# Patient Record
Sex: MALE | Race: WHITE | HISPANIC OR LATINO | Employment: OTHER | URBAN - METROPOLITAN AREA
[De-identification: names, ages, dates, MRNs, and addresses within clinical notes are randomized per-mention and may not be internally consistent; named-entity substitution may affect disease eponyms.]

---

## 2019-07-30 ENCOUNTER — TRANSFERRED RECORDS (OUTPATIENT)
Dept: HEALTH INFORMATION MANAGEMENT | Facility: CLINIC | Age: 79
End: 2019-07-30

## 2020-07-02 DIAGNOSIS — H35.349 MACULAR HOLE: Primary | ICD-10-CM

## 2020-07-02 DIAGNOSIS — H35.341 MACULAR HOLE OF RIGHT EYE: ICD-10-CM

## 2020-07-09 ENCOUNTER — OFFICE VISIT (OUTPATIENT)
Dept: OPHTHALMOLOGY | Facility: CLINIC | Age: 80
End: 2020-07-09
Attending: OPHTHALMOLOGY
Payer: MEDICARE

## 2020-07-09 DIAGNOSIS — H35.341 MACULAR HOLE OF RIGHT EYE: ICD-10-CM

## 2020-07-09 PROCEDURE — G0463 HOSPITAL OUTPT CLINIC VISIT: HCPCS | Mod: ZF

## 2020-07-09 PROCEDURE — 92134 CPTRZ OPH DX IMG PST SGM RTA: CPT | Mod: ZF | Performed by: OPHTHALMOLOGY

## 2020-07-09 RX ORDER — AMIODARONE HYDROCHLORIDE 200 MG/1
TABLET ORAL
COMMUNITY
Start: 2019-10-19 | End: 2020-07-21

## 2020-07-09 RX ORDER — ATORVASTATIN CALCIUM 20 MG/1
1 TABLET, FILM COATED ORAL AT BEDTIME
COMMUNITY
Start: 2020-04-20

## 2020-07-09 RX ORDER — ALLOPURINOL 100 MG/1
2 TABLET ORAL DAILY
COMMUNITY
Start: 2020-06-06

## 2020-07-09 ASSESSMENT — SLIT LAMP EXAM - LIDS
COMMENTS: UL DERMATOCHALASIS
COMMENTS: UL DERMATOCHALASIS

## 2020-07-09 ASSESSMENT — REFRACTION_WEARINGRX
OS_CYLINDER: +1.50
OD_CYLINDER: +1.25
OS_AXIS: 34
OS_SPHERE: -5.00
SPECS_TYPE: SVL
OD_AXIS: 070
OD_SPHERE: -1.75

## 2020-07-09 ASSESSMENT — CONF VISUAL FIELD
OS_NORMAL: 1
OD_NORMAL: 1
METHOD: COUNTING FINGERS

## 2020-07-09 ASSESSMENT — VISUAL ACUITY
OS_CC+: -2
OS_CC: 20/50
CORRECTION_TYPE: GLASSES
OD_CC: 7/200 E
METHOD: SNELLEN - LINEAR

## 2020-07-09 ASSESSMENT — EXTERNAL EXAM - RIGHT EYE: OD_EXAM: NORMAL

## 2020-07-09 ASSESSMENT — TONOMETRY
OD_IOP_MMHG: 17
OS_IOP_MMHG: 17
IOP_METHOD: TONOPEN

## 2020-07-09 ASSESSMENT — CUP TO DISC RATIO
OS_RATIO: 0.4
OD_RATIO: 0.5

## 2020-07-09 ASSESSMENT — EXTERNAL EXAM - LEFT EYE: OS_EXAM: NORMAL

## 2020-07-09 NOTE — PROGRESS NOTES
CC: Macula hole second opinion  HPI: Mushtaq Olsen is a  80 year old year-old patient with history of full thickness macular hole right eye that was unsuccessfully repaired in [place] ~8 years ago. He has also seen [facility] and [facility] who did not recommend treatment, per patient. Comes to [facility] for second opinion. Also endorses steady decline in vision left eye, associated glare - believes his cataract is getting worse.     OCULAR HISTORY  posterior chamber intraocular lens (PCIOL) right eye ~5 years ago  Cataract left eye   Full thickness macular hole right eye     Retinal Imaging:  OCT  7-9-20  RE: full thickness macula hole, surrounding cystoid macular edema; moderate ERM  LE: moderate epiretinal membrane, normal foveal contour    Assessment & Plan:    1. 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  If it failed to close with one surgery, further surgeries could be done, but the subsequent success rate was much less.  I explained that with full thickness macular holes, the average vision recovery was usually only partial.  I explained that the vision might not improve at all after surgery, that there could be persistent defects, distortion, or blurriness, and that it was possible the vision could be substantially worse or blind after surgery due to unexpected complications. I explained that I would need to place a gas bubble in the eye after surgery and that this would require face-down positioning, for a few weeks after surgery.  I explained that with a gas bubble, air travel and travel to high altitudes would be  forbidden for up to 3 months after surgery. I explained that a retinal detachment might develop either during or after surgery, and that this could require further surgeries and could result in severe vision loss despite further surgeries.  After explaining all risks, benefits and alternatives of the surgery including but not limited to endophthalmitis, retina detachment and cataract the patient agrees to proceed.    2. Epiretinal membrane, both eyes    Consider Membrane peel right eye    Foveal contour intact left eye   Suspect cataract predominant factor limiting vision left eye     3. Nuclear sclerotic cataract, left eye    Visually significant (+blur, +glare) x1 year   Might need cataract evaluation in the future     4. Pseudophakia, right eye    S/p YAG posterior capsulotomy   Visual axis clear   Vision limited by FTMH     return to clinic     Gibran Hassan MD  Ophthalmology Resident - PGY-3    ~~~~~~~~~~~~~~~~~~~~~~~~~~~~~~~~~~   Complete documentation of historical and exam elements from today's encounter can be found in the full encounter summary report (not reduplicated in this progress note).  I personally obtained the chief complaint(s) and history of present illness.  I confirmed and edited as necessary the review of systems, past medical/surgical history, family history, social history, and examination findings as documented by others; and I examined the patient myself.  I personally reviewed the relevant tests, images, and reports as documented above.  I personally reviewed the ophthalmic test(s) associated with this encounter, agree with the interpretation(s) as documented by the resident/fellow, and have edited the corresponding report(s) as necessary.   I formulated and edited as necessary the assessment and plan and discussed the findings and management plan with the patient and family    Beata Vasquez MD   of Ophthalmology.  Retina Service   Department of Ophthalmology and  Visual Neurosciences   [facility]  Phone: [phone]   Fax: [phone]

## 2020-07-09 NOTE — NURSING NOTE
Chief Complaints and History of Present Illnesses   Patient presents with     Retinal Evaluation     Chief Complaint(s) and History of Present Illness(es)     Retinal Evaluation     Laterality: right eye    Course: stable    Associated symptoms: Negative for dryness, eye pain, headache and floaters    Response to treatment: no improvement    Pain scale: 0/10              Comments     Patient was referred by Dr Melyssa Godwin for a macular hole in right eye.  His vision has seemed stable in his right eye, though it is very blurred. He tells me that he had a vitrectomy about 8-10 years ago in his right eye, but the procedure did not help his vision.      His vision in the left eye seems decreased from his cataract.  As a result, he is unable to see well in certain lighting conditions.    Sera Wong, COT 8:06 AM  July 9, 2020

## 2020-07-13 ENCOUNTER — TELEPHONE (OUTPATIENT)
Dept: OPHTHALMOLOGY | Facility: CLINIC | Age: 80
End: 2020-07-13

## 2020-07-13 DIAGNOSIS — Z11.59 ENCOUNTER FOR SCREENING FOR OTHER VIRAL DISEASES: Primary | ICD-10-CM

## 2020-07-13 NOTE — TELEPHONE ENCOUNTER
Patient is scheduled for surgery with Dr. Beata Vasquez     Spoke with: Mima     Date of Surgery: 07/21/20     Location: [facility]:  [address] [place] [address]     Informed patient they will need an adult : Yes     H&P will be completed at: [facility]     Post Op scheduled on 07/22 and 07/29     Surgery packet was 07/13     Additional comments: Advised RN will call 1 - 2 business days prior with arrival time and instructions.

## 2020-07-18 DIAGNOSIS — Z11.59 ENCOUNTER FOR SCREENING FOR OTHER VIRAL DISEASES: ICD-10-CM

## 2020-07-18 LAB
LABORATORY COMMENT REPORT: NORMAL
SARS-COV-2 RNA SPEC QL NAA+PROBE: NEGATIVE
SARS-COV-2 RNA SPEC QL NAA+PROBE: NORMAL
SPECIMEN SOURCE: NORMAL
SPECIMEN SOURCE: NORMAL

## 2020-07-18 PROCEDURE — U0003 INFECTIOUS AGENT DETECTION BY NUCLEIC ACID (DNA OR RNA); SEVERE ACUTE RESPIRATORY SYNDROME CORONAVIRUS 2 (SARS-COV-2) (CORONAVIRUS DISEASE [COVID-19]), AMPLIFIED PROBE TECHNIQUE, MAKING USE OF HIGH THROUGHPUT TECHNOLOGIES AS DESCRIBED BY CMS-2020-01-R: HCPCS | Performed by: OPHTHALMOLOGY

## 2020-07-20 ENCOUNTER — ANESTHESIA EVENT (OUTPATIENT)
Dept: SURGERY | Facility: AMBULATORY SURGERY CENTER | Age: 80
End: 2020-07-20

## 2020-07-21 ENCOUNTER — HOSPITAL ENCOUNTER (OUTPATIENT)
Facility: AMBULATORY SURGERY CENTER | Age: 80
End: 2020-07-21
Attending: OPHTHALMOLOGY
Payer: MEDICARE

## 2020-07-21 ENCOUNTER — ANESTHESIA (OUTPATIENT)
Dept: SURGERY | Facility: AMBULATORY SURGERY CENTER | Age: 80
End: 2020-07-21

## 2020-07-21 VITALS
DIASTOLIC BLOOD PRESSURE: 58 MMHG | BODY MASS INDEX: 31.22 KG/M2 | WEIGHT: 206 LBS | OXYGEN SATURATION: 98 % | SYSTOLIC BLOOD PRESSURE: 139 MMHG | RESPIRATION RATE: 18 BRPM | HEART RATE: 63 BPM | TEMPERATURE: 97.9 F | HEIGHT: 68 IN

## 2020-07-21 DIAGNOSIS — H35.341 MACULAR HOLE OF RIGHT EYE: ICD-10-CM

## 2020-07-21 RX ORDER — PHENYLEPHRINE HYDROCHLORIDE 25 MG/ML
SOLUTION/ DROPS OPHTHALMIC PRN
Status: DISCONTINUED | OUTPATIENT
Start: 2020-07-21 | End: 2020-07-21 | Stop reason: HOSPADM

## 2020-07-21 RX ORDER — SODIUM CHLORIDE, SODIUM LACTATE, POTASSIUM CHLORIDE, CALCIUM CHLORIDE 600; 310; 30; 20 MG/100ML; MG/100ML; MG/100ML; MG/100ML
INJECTION, SOLUTION INTRAVENOUS CONTINUOUS
Status: DISCONTINUED | OUTPATIENT
Start: 2020-07-21 | End: 2020-07-22 | Stop reason: HOSPADM

## 2020-07-21 RX ORDER — OXYCODONE HYDROCHLORIDE 5 MG/1
5 TABLET ORAL EVERY 4 HOURS PRN
Status: DISCONTINUED | OUTPATIENT
Start: 2020-07-21 | End: 2020-07-22 | Stop reason: HOSPADM

## 2020-07-21 RX ORDER — ONDANSETRON 2 MG/ML
4 INJECTION INTRAMUSCULAR; INTRAVENOUS EVERY 30 MIN PRN
Status: DISCONTINUED | OUTPATIENT
Start: 2020-07-21 | End: 2020-07-22 | Stop reason: HOSPADM

## 2020-07-21 RX ORDER — FENTANYL CITRATE 50 UG/ML
25-50 INJECTION, SOLUTION INTRAMUSCULAR; INTRAVENOUS
Status: DISCONTINUED | OUTPATIENT
Start: 2020-07-21 | End: 2020-07-22 | Stop reason: HOSPADM

## 2020-07-21 RX ORDER — CYCLOPENTOLAT/TROPIC/PHENYLEPH 1%-1%-2.5%
1 DROPS (EA) OPHTHALMIC (EYE)
Status: COMPLETED | OUTPATIENT
Start: 2020-07-21 | End: 2020-07-21

## 2020-07-21 RX ORDER — TETRACAINE HYDROCHLORIDE 5 MG/ML
SOLUTION OPHTHALMIC PRN
Status: DISCONTINUED | OUTPATIENT
Start: 2020-07-21 | End: 2020-07-21 | Stop reason: HOSPADM

## 2020-07-21 RX ORDER — ONDANSETRON 4 MG/1
4 TABLET, ORALLY DISINTEGRATING ORAL EVERY 30 MIN PRN
Status: DISCONTINUED | OUTPATIENT
Start: 2020-07-21 | End: 2020-07-22 | Stop reason: HOSPADM

## 2020-07-21 RX ORDER — HYDROCODONE BITARTRATE AND ACETAMINOPHEN 5; 325 MG/1; MG/1
5 TABLET ORAL PRN
COMMUNITY
Start: 2020-07-13 | End: 2023-05-09

## 2020-07-21 RX ORDER — NALOXONE HYDROCHLORIDE 0.4 MG/ML
.1-.4 INJECTION, SOLUTION INTRAMUSCULAR; INTRAVENOUS; SUBCUTANEOUS
Status: DISCONTINUED | OUTPATIENT
Start: 2020-07-21 | End: 2020-07-22 | Stop reason: HOSPADM

## 2020-07-21 RX ORDER — BALANCED SALT SOLUTION 6.4; .75; .48; .3; 3.9; 1.7 MG/ML; MG/ML; MG/ML; MG/ML; MG/ML; MG/ML
SOLUTION OPHTHALMIC PRN
Status: DISCONTINUED | OUTPATIENT
Start: 2020-07-21 | End: 2020-07-21 | Stop reason: HOSPADM

## 2020-07-21 RX ORDER — ACETAMINOPHEN 500 MG
500 TABLET ORAL EVERY 6 HOURS PRN
COMMUNITY

## 2020-07-21 RX ORDER — LIDOCAINE 40 MG/G
CREAM TOPICAL
Status: DISCONTINUED | OUTPATIENT
Start: 2020-07-21 | End: 2020-07-22 | Stop reason: HOSPADM

## 2020-07-21 RX ORDER — METHYLPREDNISOLONE 4 MG
4 TABLET, DOSE PACK ORAL PRN
COMMUNITY
Start: 2020-03-09 | End: 2023-05-10

## 2020-07-21 RX ORDER — ATROPINE SULFATE 10 MG/ML
SOLUTION/ DROPS OPHTHALMIC PRN
Status: DISCONTINUED | OUTPATIENT
Start: 2020-07-21 | End: 2020-07-21 | Stop reason: HOSPADM

## 2020-07-21 RX ORDER — TOBRAMYCIN AND DEXAMETHASONE 3; 1 MG/ML; MG/ML
1 SUSPENSION/ DROPS OPHTHALMIC 4 TIMES DAILY
Qty: 5 ML | Refills: 0 | Status: SHIPPED | OUTPATIENT
Start: 2020-07-21 | End: 2023-05-10

## 2020-07-21 RX ORDER — VALSARTAN 160 MG/1
160 TABLET ORAL DAILY
COMMUNITY
End: 2023-05-10

## 2020-07-21 RX ORDER — SODIUM CHLORIDE, SODIUM LACTATE, POTASSIUM CHLORIDE, CALCIUM CHLORIDE 600; 310; 30; 20 MG/100ML; MG/100ML; MG/100ML; MG/100ML
INJECTION, SOLUTION INTRAVENOUS CONTINUOUS PRN
Status: DISCONTINUED | OUTPATIENT
Start: 2020-07-21 | End: 2020-07-21

## 2020-07-21 RX ORDER — DEXAMETHASONE SODIUM PHOSPHATE 4 MG/ML
INJECTION, SOLUTION INTRA-ARTICULAR; INTRALESIONAL; INTRAMUSCULAR; INTRAVENOUS; SOFT TISSUE PRN
Status: DISCONTINUED | OUTPATIENT
Start: 2020-07-21 | End: 2020-07-21 | Stop reason: HOSPADM

## 2020-07-21 RX ORDER — ATROPINE SULFATE 10 MG/ML
1 SOLUTION/ DROPS OPHTHALMIC AT BEDTIME
Qty: 5 ML | Refills: 0 | Status: SHIPPED | OUTPATIENT
Start: 2020-07-21 | End: 2023-05-10

## 2020-07-21 RX ORDER — TRAZODONE HYDROCHLORIDE 50 MG/1
50 TABLET, FILM COATED ORAL AT BEDTIME
COMMUNITY
Start: 2020-07-17

## 2020-07-21 RX ORDER — MEPERIDINE HYDROCHLORIDE 25 MG/ML
12.5 INJECTION INTRAMUSCULAR; INTRAVENOUS; SUBCUTANEOUS
Status: DISCONTINUED | OUTPATIENT
Start: 2020-07-21 | End: 2020-07-22 | Stop reason: HOSPADM

## 2020-07-21 RX ORDER — PROPOFOL 10 MG/ML
INJECTION, EMULSION INTRAVENOUS PRN
Status: DISCONTINUED | OUTPATIENT
Start: 2020-07-21 | End: 2020-07-21

## 2020-07-21 RX ORDER — ONDANSETRON 2 MG/ML
INJECTION INTRAMUSCULAR; INTRAVENOUS PRN
Status: DISCONTINUED | OUTPATIENT
Start: 2020-07-21 | End: 2020-07-21

## 2020-07-21 RX ORDER — LIDOCAINE HYDROCHLORIDE 20 MG/ML
INJECTION, SOLUTION INFILTRATION; PERINEURAL PRN
Status: DISCONTINUED | OUTPATIENT
Start: 2020-07-21 | End: 2020-07-21

## 2020-07-21 RX ADMIN — Medication 1 DROP: at 07:33

## 2020-07-21 RX ADMIN — PROPOFOL 20 MG: 10 INJECTION, EMULSION INTRAVENOUS at 08:32

## 2020-07-21 RX ADMIN — SODIUM CHLORIDE, SODIUM LACTATE, POTASSIUM CHLORIDE, CALCIUM CHLORIDE: 600; 310; 30; 20 INJECTION, SOLUTION INTRAVENOUS at 07:58

## 2020-07-21 RX ADMIN — ONDANSETRON 4 MG: 2 INJECTION INTRAMUSCULAR; INTRAVENOUS at 08:04

## 2020-07-21 RX ADMIN — PROPOFOL 40 MG: 10 INJECTION, EMULSION INTRAVENOUS at 08:04

## 2020-07-21 RX ADMIN — SODIUM CHLORIDE, SODIUM LACTATE, POTASSIUM CHLORIDE, CALCIUM CHLORIDE: 600; 310; 30; 20 INJECTION, SOLUTION INTRAVENOUS at 07:48

## 2020-07-21 RX ADMIN — LIDOCAINE HYDROCHLORIDE 40 MG: 20 INJECTION, SOLUTION INFILTRATION; PERINEURAL at 08:32

## 2020-07-21 RX ADMIN — LIDOCAINE HYDROCHLORIDE 60 MG: 20 INJECTION, SOLUTION INFILTRATION; PERINEURAL at 08:04

## 2020-07-21 RX ADMIN — Medication 1 DROP: at 07:27

## 2020-07-21 RX ADMIN — Medication 1 DROP: at 07:19

## 2020-07-21 ASSESSMENT — ENCOUNTER SYMPTOMS: DYSRHYTHMIAS: 1

## 2020-07-21 ASSESSMENT — MIFFLIN-ST. JEOR: SCORE: 1623.91

## 2020-07-21 ASSESSMENT — LIFESTYLE VARIABLES: TOBACCO_USE: 1

## 2020-07-21 NOTE — ANESTHESIA POSTPROCEDURE EVALUATION
Anesthesia POST Procedure Evaluation    Patient: Mushtaq Olsen   MRN:     [id number] Gender:   male   Age:    80 year old :      1940        Preoperative Diagnosis: Macular hole of right eye [H35.341]   Procedure(s):  Right Eye 25 Gauge Parsplana Vitrectomy, Membrane Peel, Endolaser, Fluid/Gas Exchange, Infusion of 14% C3F8 Gas   Postop Comments: No value filed.     Anesthesia Type: MAC       Disposition: Outpatient   Postop Pain Control: Uneventful            Sign Out: Well controlled pain   PONV: No   Neuro/Psych: Uneventful            Sign Out: Acceptable/Baseline neuro status   Airway/Respiratory: Uneventful            Sign Out: Acceptable/Baseline resp. status   CV/Hemodynamics: Uneventful            Sign Out: Acceptable CV status   Other NRE: NONE   DID A NON-ROUTINE EVENT OCCUR? No         Last Anesthesia Record Vitals:  CRNA VITALS  2020 0905 - 2020 0952      2020             Pulse:  60    Ht Rate:  59    SpO2:  98 %    Resp Rate (set):  10          Last PACU Vitals:  Vitals Value Taken Time   BP     Temp     Pulse     Resp     SpO2     Temp src     NIBP 155/87 2020  9:31 AM   Pulse 60 2020  9:35 AM   SpO2 98 % 2020  9:35 AM   Resp     Temp     Ht Rate 59 2020  9:35 AM   Temp 2           Electronically Signed By: Varinder Guerra MD, MD, 2020, 9:52 AM

## 2020-07-21 NOTE — ANESTHESIA CARE TRANSFER NOTE
Patient: Mushtaq Olsen    Procedure(s):  Right Eye 25 Gauge Parsplana Vitrectomy, Membrane Peel, Endolaser, Fluid/Gas Exchange, Infusion of 14% C3F8 Gas    Diagnosis: Macular hole of right eye [H35.341]  Diagnosis Additional Information: No value filed.    Anesthesia Type:   MAC     Note:    Patient transferred to:Phase II  Handoff Report: Identifed the Patient, Identified the Reponsible Provider, Reviewed the pertinent medical history, Discussed the surgical course, Reviewed Intra-OP anesthesia mangement and issues during anesthesia, Set expectations for post-procedure period and Allowed opportunity for questions and acknowledgement of understanding      Vitals: (Last set prior to Anesthesia Care Transfer)    CRNA VITALS  7/21/2020 0905 - 7/21/2020 0935      7/21/2020             Pulse:  60    Ht Rate:  59    SpO2:  98 %    Resp Rate (set):  10                Electronically Signed By: ALEJANDRINA Engel CRNA  July 21, 2020  9:35 AM

## 2020-07-21 NOTE — ANESTHESIA PREPROCEDURE EVALUATION
"Anesthesia Pre-Procedure Evaluation    Patient: Mushtaq Olsen   MRN:     [id number] Gender:   male   Age:    80 year old :      1940        Preoperative Diagnosis: Macular hole of right eye [H35.341]   Procedure(s):  Right Eye VITRECTOMY, PARS PLANA APPROACH, USING 25-GAUGE INSTRUMENTS, membrane peel, fluid-gas exchange     LABS:  CBC: No results found for: WBC, HGB, HCT, PLT  BMP: No results found for: NA, POTASSIUM, CHLORIDE, CO2, BUN, CR, GLC  COAGS: No results found for: PTT, INR, FIBR  POC: No results found for: BGM, HCG, HCGS  OTHER: No results found for: PH, LACT, A1C, VA, PHOS, MAG, ALBUMIN, PROTTOTAL, ALT, AST, GGT, ALKPHOS, BILITOTAL, BILIDIRECT, LIPASE, AMYLASE, REKHA, TSH, T4, T3, CRP, SED     Preop Vitals    BP Readings from Last 3 Encounters:   20 130/71    Pulse Readings from Last 3 Encounters:   No data found for Pulse      Resp Readings from Last 3 Encounters:   20 18    SpO2 Readings from Last 3 Encounters:   20 96%      Temp Readings from Last 1 Encounters:   20 36.7  C (98.1  F) (Oral)    Ht Readings from Last 1 Encounters:   20 1.727 m (5' 8\")      Wt Readings from Last 1 Encounters:   20 93.4 kg (206 lb)    Estimated body mass index is 31.32 kg/m  as calculated from the following:    Height as of this encounter: 1.727 m (5' 8\").    Weight as of this encounter: 93.4 kg (206 lb).     LDA:  Peripheral IV 20 Right Hand (Active)   Site Assessment WDL 20 0743   Line Status Infusing 20 0743   Phlebitis Scale 0-->no symptoms 20 0743   Dressing Intervention New dressing  20 0743   Number of days: 0        Past Medical History:   Diagnosis Date     Gout      Heart disease      Hypertension       Past Surgical History:   Procedure Laterality Date     CATARACT IOL, RT/LT Right      IMPLANT PACEMAKER       YAG CAPSULOTOMY OD (RIGHT EYE)  2016      No Known Allergies     Anesthesia Evaluation     . Pt has had prior anesthetic. " Type: General    No history of anesthetic complications          ROS/MED HX    ENT/Pulmonary:  - neg pulmonary ROS   (+)tobacco use, Past use , . .    Neurologic:  - neg neurologic ROS     Cardiovascular:     (+) hypertension----. : . . . pacemaker :. dysrhythmias a-fib, .       METS/Exercise Tolerance:     Hematologic:         Musculoskeletal:         GI/Hepatic:  - neg GI/hepatic ROS       Renal/Genitourinary:  - ROS Renal section negative       Endo:  - neg endo ROS       Psychiatric:  - neg psychiatric ROS       Infectious Disease:  - neg infectious disease ROS       Malignancy:         Other:                         PHYSICAL EXAM:   Mental Status/Neuro: A/A/O   Airway: Facies: Feasible  Mallampati: I  Mouth/Opening: Full  TM distance: > 6 cm  Neck ROM: Full   Respiratory: Auscultation: CTAB     Resp. Rate: Normal     Resp. Effort: Normal      CV: Rhythm: Regular  Rate: Age appropriate  Heart: Normal Sounds  Edema: None   Comments:      Dental: Normal Dentition                Assessment:   ASA SCORE: 3    H&P: History and physical reviewed and following examination; no interval change.   Smoking Status:  Non-Smoker/Unknown   NPO Status: NPO Appropriate     Plan:   Anes. Type:  MAC   Pre-Medication: None   Induction:  N/a   Airway: Native Airway   Access/Monitoring: PIV   Maintenance: N/a     Postop Plan:   Postop Pain: None  Postop Sedation/Airway: Not planned  Disposition: Outpatient     PONV Management:   Adult Risk Factors:, Non-Smoker   Prevention: Ondansetron, Dexamethasone     CONSENT: Direct conversation   Plan and risks discussed with: Patient                      Varinder Guerra MD, MD

## 2020-07-21 NOTE — DISCHARGE INSTRUCTIONS
[facility]  Home Care Following Anesthesia  For 24 hours after surgery:  1. Get plenty of rest.  A responsible adult must stay with you for at least 24 hours after you leave the surgery center.  2. Do not drive or use heavy equipment.  If you have weakness or tingling, don't drive or use heavy equipment until this feeling goes away.   3. Do not drink alcohol.   4. Avoid strenuous or risky activities.  Ask for help when climbing stairs.  5. You may feel lightheaded.  IF so, sit for a few minutes before standing.  Have someone help you get up.   6. If you have nausea (feel sick to your stomach): Drink only clear liquids such as apple juice, ginger ale, broth or 7-Up.  Rest may also help.  Be sure to drink enough fluids.  Move to a regular diet as you feel able.   7. You may have a slight fever.  Call the doctor if your fever is over 100 F (37.7 C) (taken under the tongue) or lasts longer than 24 hours.  8. You may have a dry mouth, a sore throat, muscle aches or trouble sleeping. These should go away after 24 hours.  9. Do not make important or legal decisions.         Tips for taking pain medications  To get the best pain relief possible, remember these points:    Take pain medications as directed, before pain becomes severe.    Pain medication can upset your stomach: taking it with food may help.    Constipation is a common side effect of pain medication. Drink plenty of  fluids.    Eat foods high in fiber. Take a stool softener if recommended by your doctor or pharmacist.    Do not drink alcohol, drive or operate machinery while taking pain medications.    Ask about other ways to control pain, such as with heat, ice or relaxation.    Tylenol/Acetaminophen Consumption  To help encourage the safe use of acetaminophen, the makers of TYLENOL  have lowered the maximum daily dose for single-ingredient Extra Strength TYLENOL  (acetaminophen) products sold in the [place] from 8 pills per  day (4,000 mg) to 6 pills per day (3,000 mg). The dosing interval has also changed from 2 pills every 4-6 hours to 2 pills every 6 hours.    If you feel your pain relief is insufficient, you may take Tylenol/Acetaminophen in addition to your narcotic pain medication.     Be careful not to exceed 3,000 mg of Tylenol/Acetaminophen in a 24 hour period from all sources.    If you are taking extra strength Tylenol/acetaminophen (500 mg), the maximum dose is 6 tablets in 24 hours.    If you are taking regular strength acetaminophen (325 mg), the maximum dose is 9 tablets in 24 hours.    Call a doctor for any of the followin. Signs of infection (fever, growing tenderness at the surgery site, a large amount of drainage or bleeding, severe pain, foul-smelling drainage, redness, swelling).  2. It has been over 8 to 10 hours since surgery and you are still not able to urinate (pass water).  3. Headache for over 24 hours.  4. Numbness, tingling or weakness the day after surgery (if you had spinal anesthesia).  5. Signs of Covid-19 infection (temperature over 100 degrees, shortness of breath, cough, loss of taste/smell, generalized body aches, persistent headache, chills, sore throat, nausea/vomiting/diarrhea)  Your doctor is:  ***  Dr. Beata Vasquez, Ophthalmology: [phone]               Or dial [phone] and ask for the resident on call for:  Ophthalmology  For emergency care, call the:  [place] [facility]:  [phone] (TTY for hearing impaired: [phone])  [facility]  [phone]  Post Operative Eye Surgery Instructions      Beata Vasquez MD    Will I have pain?  Some discomfort is normal and expected following surgery. The first few days after surgery you may need to use prescription pain pills. Taking Tylenol (acetaminophen) regularly may help prevent pain.  Discomfort should gradually decrease and Tylenol should be sufficient to relieve pain. A foreign body sensation in the  cornea of the eye is very common and caused by sutures placed at surgery. These sutures will go away in one to two weeks. If the pain worsens, you should call the doctor.  Do I need to wear an eye patch?  You do not need to wear an eye patch at home after the doctor has removed the patch on your first day after surgery. However, you may be more comfortable wearing a patch outside in the sun, when sleeping or napping, or in a remi, windy environment.  How much drainage should I have?  You may expect a moderate amount of drainage for a week. Gradually the drainage should decrease. The lids can be cleaned with a clean washcloth and gentle soap or diluted baby shampoo. Wipe the eyelids gently from the nose outward. Some blood in the tears is a normal finding.  Will there be swelling?  Some swelling is normal for about a week or two after which it will gradually decrease. Applying a cool compress, using a clean washcloth for 5 - 10 minutes several times a day may reduce the swelling and make you more comfortable. People may have some swelling of both eyes, especially if face down positioning is required. The white part of the eye may appear very red or bloody for a week or two. This may get worse a few days after surgery. Though the bright red appearance can look frightening, it is a normal finding early after surgery and will resolve in a few weeks.  Will I need to use eye drops?  You will be using several different kinds of eye drops or ointment (salve) when you leave the hospital. The directions will be on each bottle or tube. The medication with the red top will keep your eye dilated and may make your eye more sensitive to light. Wearing sunglasses may help. The other medication is a combination antibiotic/steroid to prevent infection and promote healing.  Occasionally a third drop is used to control the pressure in your eye. A new bottle of artificial tears or lubricant ointment may be used along with your  prescription eye drops after surgery. You will be using drops from four to eight weeks. Bring all eye medications (drops. ointments, or pills) with you  to each visit.   Always wash your hands before putting in the eye drops. You may wish to have someone else help you. Pull down on the lower lid and squeeze one drop from the bottle being careful not to touch the dropper to your eye or eyelid. One drop is sufficient, but another may be used if the first did not go into the eye. It is often easier to put in the drops if you are reclining or lying down. Wait one to three minutes after the first drop before using the second drop to allow the medications to absorb into the eye.  How long will it take for my vision to improve?  Your vision should gradually improve, but it may take up to six months to regain your best vision. Frequently, air or gas bubbles are injected into the eye at the time of surgery. This will blur your vision significantly at first. As the bubble becomes smaller it will cause a black line in your vision that moves as you move your head. As the bubble becomes smaller you may notice that it looks more like a bubble or that it will break up into several smaller bubbles. It will take from a few days to a few weeks for the bubble to dissolve and be replaced by clear fluid.  You may notice floaters or double vision after your surgery. These symptoms usually will decrease with time. If the double vision is bothersome patching the eye may help.  If you notice a sudden worsening in your vision call your doctor.  Are there any physical restrictions after surgery?  If an air or gas bubble was placed in the eye during surgery, you will be asked to spend most of your time (both awake and during the night) with your head in a specific position, frequently face down. As the eye heals and the bubble dissolves there will be less of a need for you to stay in that specific position. You should avoid sleeping on your  back until the bubble has totally dissolved and you have been given permission from your surgeon. You should not fly in an airplane or go to high altitudes in the mountains while there is a bubble in your eye. If you should require any other surgery, under general anesthesia, while you still have an air bubble in your eye, have your surgeon or anesthetist contact us prior to your surgery. Some anesthetic agents can make the bubble expand and seriously damage your eye.  Patients that have a gas/air bubble placed in their eye will have a green medical alert band on their wrist.  This band should not be removed until the doctor removes it for you or gives you permission to remove it.      Heavy lifting (greater than 50 pounds), swimming and contact sports should be avoided for about 3 to 4 weeks after surgery.    You may resume your usual sexual activities about one week after surgery.  When may I return to work or my normal activities?  Depending on the type or work, you may return to work within a few days. If your work involves physical activity or driving, you will need to restrict your activities and remain home longer.  You may watch television, look at magazines, or work puzzles. Reading may be uncomfortable for several days, but using the eyes will not cause any damage.  You may go outside as usual. If conditions are windy or remi, wear an eye pad to avoid getting dust or dirt in the eye.  Can I travel?  You cannot fly in an airplane or drive into the mountains as long as the air or gas bubble remains in your eye.    Are there any driving restrictions?  Someone will need to drive you home from the hospital. Generally driving can be resumed in several days if you have good vision in your other eye. If you do not feel comfortable driving, do not drive! Your depth perception will be decreased so you will want to try driving during the day in light traffic until you feel comfortable driving. You should restrict  your driving while you are taking prescription pain pills as they also can affect your judgment.  When can I shower and wash my hair?  You may shower or bathe when you get home, but avoid getting water in your eye. You may want someone to help you shampoo your hair at first.You may shave, brush your teeth, or comb your hair. Do not use make-up, mascara, or creams/lotions around your eye for several weeks.  When will I see the doctor again?  Generally, you will be seen the first day after surgery and again 1-2 weeks later. If you have not received a return appointment before leaving the hospital, you should call our office during the business hours to arrange an appointment. If you will be seeing your local doctor instead of us, you will need to call that office to set up an appointment.    If you have a green armband on, your physician will instruct you as to how long you will have to wear it at your post-operative follow-up appointment.  How do I reach a doctor if I have concerns?  One of our doctors is available by calling [facility] [phone]. Please try to call for routine questions and prescription refills during business hours.  You should call your doctor if:  You notice a sudden decrease in your vision.  Have severe pain or pain increases rather than subsiding.  You notice a new black curtain over your eye that is not the gas bubble. If you have any of these symptoms, you may need to be examined.

## 2020-07-21 NOTE — OP NOTE
SURGEON:   JOAQUÍN MCNEILL   PREOPERATIVE DIAGNOSIS: Large macular hole, Right Eye  POSTOPERATIVE DIAGNOSIS: same  NAME OF THE PROCEDURE:   25 gauge Pars plana vitrectomy (PPV), internal limiting membrane peeling in ILM flap technique, gas C3F8 14%  ANESTHESIA: Monitored anesthesia care and peribulbar block   COMPLICATIONS: none  INDICATIONS: Mushtaq Olsen is a 80 year old patient with diagnosis of macula hole left eye. He is here for surgical repair      DESCRIPTION OF THE PROCEDURE:  The patient was taken to the operative holding area where intravenous sedation was administered and a perbulbar block consisting of a 1:1 mixture of 2%lidocaine and 0.75% marcaine with epinephrine and wydase, was administered to the operative eye with adequate anesthesia and akinesia.    The patient was then brought into the operating room where the operative eye was prepped and draped in the usual sterile surgical fashion for ophthalmic surgery, including the installation of one drop of 5% Povidone Iodine.  A sterile drape was placed over the face and body and a lid speculum was inserted.      A 25 gauge infusion cannula was placed 3.5 mm posterior to the limbus inferotemporally. The infusion cannula was connected and its intravitreal location was verified by direct visualization. The infusion was turned on.   Two other 25 gauge trocars were placed 3.5 mm posterior to the limbus at 10:00 o'clock and 2:00 o'clock position. The vitrectomy handpiece and endoilluminator were placed in the eye.  A vitrectomy was performed.  Upon entering the eye, it was noted the pt has a large macula hole.   Next approx 0.5 ml of kenalog was injected. Additional vitrectomy was performed under scleral depression. Careful posterior hyaloid peeling was performed. Next brillian blue was injected to stain the internal limiting membrane. An internal limiting membrane peeling was performed using a pic and internal limiting membrane forceps. The ILM flap was  peeled 360 degrees and left attached at the hole margin. A soft tip canula was used to gently position the ILM into the macular hole for scaffold to improve hole closure given large and chronic nature of hole.   Air fluid exchange was performed followed by 14% C3F8 gas was flushed through the eye. The trocars removed. The sclerotomies were closed with 6-0 plain gut sutures and were watertight.  The lid speculum was removed.  Subconjunctival injection of Dexamethasone and Ancef were administered.  The eye was cleaned with wet and dry gauze. Maxitrol ointment and Atropine drops were placed on the eye.  A patch and Arredondo shield were placed over the eye.     The surgery was assisted by Dr.Tahsin Fan. Due to the delicate and complex nature of this surgery, Dr.Tahsin Fan was required. Dr.Tahsin Fan assisted with vitrectomy. I was present for the entire surgery.

## 2020-07-22 ENCOUNTER — OFFICE VISIT (OUTPATIENT)
Dept: OPHTHALMOLOGY | Facility: CLINIC | Age: 80
End: 2020-07-22
Attending: OPHTHALMOLOGY
Payer: MEDICARE

## 2020-07-22 DIAGNOSIS — Z48.810 AFTERCARE FOLLOWING SURGERY OF A SENSORY ORGAN: Primary | ICD-10-CM

## 2020-07-22 PROCEDURE — G0463 HOSPITAL OUTPT CLINIC VISIT: HCPCS | Mod: ZF

## 2020-07-22 ASSESSMENT — SLIT LAMP EXAM - LIDS
COMMENTS: UL DERMATOCHALASIS
COMMENTS: UL DERMATOCHALASIS

## 2020-07-22 ASSESSMENT — VISUAL ACUITY
OS_SC: 20/40
OD_SC: CF @ 1FT
METHOD: SNELLEN - LINEAR
OS_SC+: -1

## 2020-07-22 ASSESSMENT — EXTERNAL EXAM - LEFT EYE: OS_EXAM: NORMAL

## 2020-07-22 ASSESSMENT — TONOMETRY
OD_IOP_MMHG: 17
OS_IOP_MMHG: 14
IOP_METHOD: TONOPEN

## 2020-07-22 ASSESSMENT — CUP TO DISC RATIO: OD_RATIO: 0.5

## 2020-07-22 ASSESSMENT — EXTERNAL EXAM - RIGHT EYE: OD_EXAM: NORMAL

## 2020-07-22 NOTE — NURSING NOTE
"Chief Complaint(s) and History of Present Illness(es)     Post Op (Ophthalmology) Right Eye     In right eye.              Comments     1st day post op RE Vit (07/21/20). Pt denies any eye pain. Pt notes he did sleep well last night as well. Vision is a little in the RE. Pt c/o a \"liquid\" inside the eye.    Ocular meds: Have not started drops yet  maxitrol drops RE  Atropine RE    Marguerite Mckeon, COMT 7:55 AM July 22, 2020                     "

## 2020-07-22 NOTE — PROGRESS NOTES
Postoperative day 1 status post Right Eye 25 Gauge Parsplana Vitrectomy, Membrane Peel, Endolaser, Fluid/Gas Exchange, Infusion of 14% C3F8 Gas  7-21-20    Slept well  Retina attached  Doing well    Plan:  Position: face down  No aviation  No heavy lifting   Arredondo shield at all times  Retina detachment and endophthalmitis precautions were discussed with the patient and was asked to return if any of the those occur    Medications to operative eye  Maxitrol (pink top) four times a day    Maxitrol oint at bedtime  Atropine (red top) once a day     Follow up in one week    Emil Fan MD  Vitreoretinal Surgery Fellow  [facility]    ~~~~~~~~~~~~~~~~~~~~~~~~~~~~~~~~~~   Complete documentation of historical and exam elements from today's encounter can be found in the full encounter summary report (not reduplicated in this progress note).  I personally obtained the chief complaint(s) and history of present illness.  I confirmed and edited as necessary the review of systems, past medical/surgical history, family history, social history, and examination findings as documented by others; and I examined the patient myself.  I personally reviewed the relevant tests, images, and reports as documented above.  I formulated and edited as necessary the assessment and plan and discussed the findings and management plan with the patient and family    Beata Vasquez MD  .  Retina Service   Department of Ophthalmology and Visual Neurosciences   [facility]  Phone: [phone]   Fax: [phone]

## 2020-07-29 ENCOUNTER — OFFICE VISIT (OUTPATIENT)
Dept: OPHTHALMOLOGY | Facility: CLINIC | Age: 80
End: 2020-07-29
Attending: OPHTHALMOLOGY
Payer: MEDICARE

## 2020-07-29 DIAGNOSIS — H40.051 BORDERLINE GLAUCOMA OF RIGHT EYE WITH OCULAR HYPERTENSION: Primary | ICD-10-CM

## 2020-07-29 PROCEDURE — G0463 HOSPITAL OUTPT CLINIC VISIT: HCPCS | Mod: ZF

## 2020-07-29 RX ORDER — DORZOLAMIDE HYDROCHLORIDE AND TIMOLOL MALEATE 20; 5 MG/ML; MG/ML
1 SOLUTION/ DROPS OPHTHALMIC 2 TIMES DAILY
Qty: 1 BOTTLE | Refills: 11 | Status: SHIPPED | OUTPATIENT
Start: 2020-07-29 | End: 2023-05-10

## 2020-07-29 ASSESSMENT — REFRACTION_WEARINGRX
OD_AXIS: 070
OS_CYLINDER: +1.50
OD_SPHERE: -1.75
SPECS_TYPE: SVL
OD_CYLINDER: +1.25
OS_AXIS: 34
OS_SPHERE: -5.00

## 2020-07-29 ASSESSMENT — TONOMETRY
OD_IOP_MMHG: 29
IOP_METHOD: TONOPEN
OS_IOP_MMHG: 13

## 2020-07-29 ASSESSMENT — VISUAL ACUITY
METHOD: SNELLEN - LINEAR
OS_CC: 20/50
CORRECTION_TYPE: GLASSES
OD_CC: CF @ 1'

## 2020-07-29 ASSESSMENT — SLIT LAMP EXAM - LIDS
COMMENTS: UL DERMATOCHALASIS
COMMENTS: UL DERMATOCHALASIS

## 2020-07-29 ASSESSMENT — EXTERNAL EXAM - LEFT EYE: OS_EXAM: NORMAL

## 2020-07-29 ASSESSMENT — EXTERNAL EXAM - RIGHT EYE: OD_EXAM: NORMAL

## 2020-07-29 ASSESSMENT — CUP TO DISC RATIO: OD_RATIO: 0.5

## 2020-07-29 NOTE — NURSING NOTE
Chief Complaints and History of Present Illnesses   Patient presents with     Post Op (Ophthalmology) Right Eye     Chief Complaint(s) and History of Present Illness(es)     Post Op (Ophthalmology) Right Eye     Laterality: right eye    Onset: 1 week ago              Comments     status post Right Eye 25 Gauge Parsplana Vitrectomy, Membrane Peel, Endolaser, Fluid/Gas Exchange, Infusion of 14% C3F8 Gas  7-21-20-Pt. States that VA is still really blurry RE. No pain or dryness BE.   Marian Berg COT 1:01 PM July 29, 2020

## 2020-07-29 NOTE — PROGRESS NOTES
Postoperative day 8 status post Right Eye 25 Gauge Parsplana Vitrectomy, Membrane Peel, Endolaser, Fluid/Gas Exchange, Infusion of 14% C3F8 Gas  7-21-20    Retina attached  Gas 80%  Doing well    Plan:  Position: not on back; OK straight, R or L side down   No aviation  No heavy lifting   Arredondo shield at night   Retina detachment and endophthalmitis precautions were discussed with the patient and was asked to return if any of the those occur    Medications to operative eye  Maxitrol (pink top) Three times a day x 1 week, then twice a day x 1 week and then once a day till finish  cosopt (blue top) twice a day   Atropine (red top) stop    Follow up in  3 weeks with Optical Coherence Tomography and optos-  Patient takes time to dilate        ~~~~~~~~~~~~~~~~~~~~~~~~~~~~~~~~~~   Complete documentation of historical and exam elements from today's encounter can be found in the full encounter summary report (not reduplicated in this progress note).  I personally obtained the chief complaint(s) and history of present illness.  I confirmed and edited as necessary the review of systems, past medical/surgical history, family history, social history, and examination findings as documented by others; and I examined the patient myself.  I personally reviewed the relevant tests, images, and reports as documented above.  I formulated and edited as necessary the assessment and plan and discussed the findings and management plan with the patient and family    Beata Vasquez MD  .  Retina Service   Department of Ophthalmology and Visual Neurosciences   [facility]  Phone: [phone]   Fax: [phone]

## 2020-07-29 NOTE — PATIENT INSTRUCTIONS
Position: not on back; OK straight, R or L side down   No aviation  No heavy lifting   Arredondo shield at night   Retina detachment and endophthalmitis precautions were discussed with the patient and was asked to return if any of the those occur    Medications to operative eye  Maxitrol (pink top) Three times a day x 1 week, then twice a day x 1 week and then once a day till finish  cosopt (blue top) twice a day   Atropine (red top) stop    Follow up in  3 weeks with Optical Coherence Tomography and optos

## 2020-08-17 DIAGNOSIS — H35.341 MACULAR HOLE OF RIGHT EYE: Primary | ICD-10-CM

## 2020-08-19 ENCOUNTER — OFFICE VISIT (OUTPATIENT)
Dept: OPHTHALMOLOGY | Facility: CLINIC | Age: 80
End: 2020-08-19
Attending: OPHTHALMOLOGY
Payer: MEDICARE

## 2020-08-19 DIAGNOSIS — H35.341 MACULAR HOLE OF RIGHT EYE: ICD-10-CM

## 2020-08-19 PROCEDURE — 92134 CPTRZ OPH DX IMG PST SGM RTA: CPT | Mod: ZF | Performed by: OPHTHALMOLOGY

## 2020-08-19 PROCEDURE — 92250 FUNDUS PHOTOGRAPHY W/I&R: CPT | Mod: ZF | Performed by: OPHTHALMOLOGY

## 2020-08-19 ASSESSMENT — EXTERNAL EXAM - RIGHT EYE: OD_EXAM: NORMAL

## 2020-08-19 ASSESSMENT — SLIT LAMP EXAM - LIDS
COMMENTS: UL DERMATOCHALASIS
COMMENTS: UL DERMATOCHALASIS

## 2020-08-19 ASSESSMENT — REFRACTION_WEARINGRX
OD_SPHERE: -1.75
OS_SPHERE: -5.00
OD_AXIS: 070
OS_AXIS: 34
SPECS_TYPE: SVL
OS_CYLINDER: +1.50
OD_CYLINDER: +1.25

## 2020-08-19 ASSESSMENT — VISUAL ACUITY
METHOD: SNELLEN - LINEAR
OS_CC+: -2
CORRECTION_TYPE: GLASSES
OD_CC: 20/600
OS_CC: 20/40

## 2020-08-19 ASSESSMENT — CONF VISUAL FIELD
OS_NORMAL: 1
METHOD: COUNTING FINGERS
OD_INFERIOR_NASAL_RESTRICTION: 3
OD_INFERIOR_TEMPORAL_RESTRICTION: 3

## 2020-08-19 ASSESSMENT — TONOMETRY
IOP_METHOD: ICARE
OS_IOP_MMHG: 13
OD_IOP_MMHG: 12

## 2020-08-19 ASSESSMENT — EXTERNAL EXAM - LEFT EYE: OS_EXAM: NORMAL

## 2020-08-19 ASSESSMENT — CUP TO DISC RATIO: OD_RATIO: 0.5

## 2020-08-19 NOTE — PROGRESS NOTES
Postoperative week 4 status post Right Eye 25 Gauge Parsplana Vitrectomy, Membrane Peel, Endolaser, Fluid/Gas Exchange, Infusion of 14% C3F8 Gas  7-21-20    Retina attached  Gas 50%  Doing well    OCULAR IMAGING  OCT 8-19-20  right eye macula hole closure  left eye     AF 8-19-20  right eye hypoautofluresence in area of mac hole   left eye normal     PHOTOS 8-19-20  right eye s/p mac hole repair. Endolaser sup and inf  left eye normal       Plan:  Position: not on back; OK straight, R or L side down   No aviation  No heavy lifting   Arredondo shield at night   Retina detachment and endophthalmitis precautions were discussed with the patient and was asked to return if any of the those occur    Medications to operative eye  Discontinue gtt       Follow up 1mo. IOL calcs ou, dfe ou    Emil Fan MD  Vitreoretinal Surgery Fellow  [facility]     ~~~~~~~~~~~~~~~~~~~~~~~~~~~~~~~~~~   Complete documentation of historical and exam elements from today's encounter can be found in the full encounter summary report (not reduplicated in this progress note).  I personally obtained the chief complaint(s) and history of present illness.  I confirmed and edited as necessary the review of systems, past medical/surgical history, family history, social history, and examination findings as documented by others; and I examined the patient myself.  I personally reviewed the relevant tests, images, and reports as documented above.  I personally reviewed the ophthalmic test(s) associated with this encounter, agree with the interpretation(s) as documented by the resident/fellow, and have edited the corresponding report(s) as necessary.   I formulated and edited as necessary the assessment and plan and discussed the findings and management plan with the patient and family    Beata Vasquez MD   of Ophthalmology.  Retina Service   Department of Ophthalmology and Visual Neurosciences   [facility]  [place]  Phone: [phone]   Fax: [phone]

## 2020-09-11 DIAGNOSIS — H35.341 MACULAR HOLE OF RIGHT EYE: Primary | ICD-10-CM

## 2020-09-17 ENCOUNTER — OFFICE VISIT (OUTPATIENT)
Dept: OPHTHALMOLOGY | Facility: CLINIC | Age: 80
End: 2020-09-17
Attending: OPHTHALMOLOGY
Payer: MEDICARE

## 2020-09-17 DIAGNOSIS — H35.341 MACULAR HOLE OF RIGHT EYE: ICD-10-CM

## 2020-09-17 PROCEDURE — 92134 CPTRZ OPH DX IMG PST SGM RTA: CPT | Mod: ZF | Performed by: OPHTHALMOLOGY

## 2020-09-17 PROCEDURE — G0463 HOSPITAL OUTPT CLINIC VISIT: HCPCS | Mod: ZF

## 2020-09-17 ASSESSMENT — REFRACTION_WEARINGRX
OS_CYLINDER: +1.50
OD_AXIS: 070
SPECS_TYPE: SVL
OD_SPHERE: -1.75
OD_CYLINDER: +1.25
OS_SPHERE: -5.00
OS_AXIS: 34

## 2020-09-17 ASSESSMENT — SLIT LAMP EXAM - LIDS
COMMENTS: UL DERMATOCHALASIS
COMMENTS: UL DERMATOCHALASIS

## 2020-09-17 ASSESSMENT — TONOMETRY
OS_IOP_MMHG: 14
OD_IOP_MMHG: 12
IOP_METHOD: TONOPEN

## 2020-09-17 ASSESSMENT — VISUAL ACUITY
METHOD: SNELLEN - LINEAR
OS_CC: 20/50
OS_CC+: -2
OD_CC: 20/500
CORRECTION_TYPE: GLASSES

## 2020-09-17 ASSESSMENT — CONF VISUAL FIELD
OS_NORMAL: 1
OD_NORMAL: 1

## 2020-09-17 ASSESSMENT — EXTERNAL EXAM - RIGHT EYE: OD_EXAM: NORMAL

## 2020-09-17 ASSESSMENT — CUP TO DISC RATIO
OD_RATIO: 0.5
OS_RATIO: 0.4

## 2020-09-17 ASSESSMENT — EXTERNAL EXAM - LEFT EYE: OS_EXAM: NORMAL

## 2020-09-17 NOTE — PROGRESS NOTES
status post Right Eye 25 Gauge Parsplana Vitrectomy, Membrane Peel, Endolaser, Fluid/Gas Exchange, Infusion of 14% C3F8 Gas  7-21-20  For a longstanding full thickness macula hole right eye     Retina attached  Gas 5%  Doing well    OCULAR IMAGING  OCT 09/17/20   right eye macula hole closure  left eye Epiretinal membrane and small subfoveal outer hole with disruption IS/OS junction    AF 8-19-20  right eye hypoautofluresence in area of mac hole   left eye normal       1. Post-op Macula hole repair right eye   Retina attached   Doing well    2. Epiretinal membrane left eye    Consider Membrane peel right eye    Foveal contour intact left eye. small subfoveal outer hole with disruption IS/OS junction?   Observe fot now    3. Nuclear sclerotic cataract, left eye    Visually significant (+blur, +glare) x1 year    need cataract evaluation next follow up      4. Pseudophakia, right eye    S/p YAG posterior capsulotomy   Visual axis clear   Vision limited by [facility] - now closed    Plan:  Position: any  No heavy lifting   Arredondo shield at night   Retina detachment and endophthalmitis precautions were discussed with the patient and was asked to return if any of the those occur    Medications to operative eye -- Discontinue gtt       Follow up 1-2 mo. IOL calcs ou, dfe both eyes  Optical Coherence Tomography with thin cuts macula both eyes   MARLEN testing         ~~~~~~~~~~~~~~~~~~~~~~~~~~~~~~~~~~   Complete documentation of historical and exam elements from today's encounter can be found in the full encounter summary report (not reduplicated in this progress note).  I personally obtained the chief complaint(s) and history of present illness.  I confirmed and edited as necessary the review of systems, past medical/surgical history, family history, social history, and examination findings as documented by others; and I examined the patient myself.  I personally reviewed the relevant tests, images, and reports as documented above.  I  formulated and edited as necessary the assessment and plan and discussed the findings and management plan with the patient and family    Beata Vasquez MD   of Ophthalmology.  Retina Service   Department of Ophthalmology and Visual Neurosciences   [facility]  Phone: [phone]   Fax: [phone]

## 2020-09-17 NOTE — NURSING NOTE
Chief Complaints and History of Present Illnesses   Patient presents with     Macular Hole Follow Up     Chief Complaint(s) and History of Present Illness(es)     Macular Hole Follow Up     Laterality: right eye    Onset: 1 month ago              Comments     Pt. States that there has been no change in VA BE. No pain BE. No flashes or floaters BE.  Marian Berg COT 8:39 AM September 17, 2020

## 2020-10-12 ENCOUNTER — TELEPHONE (OUTPATIENT)
Dept: OPHTHALMOLOGY | Facility: CLINIC | Age: 80
End: 2020-10-12

## 2020-10-12 NOTE — TELEPHONE ENCOUNTER
[facility] Call Center    Phone Message    May a detailed message be left on voicemail: yes     Reason for Call: Other:   Wife is wondering if it's possible for pt to come in sooner and possibly complete the procedure before the end of the month? Please call wife back.     Action Taken: Other:  eye    Travel Screening: Not Applicable

## 2020-10-15 ENCOUNTER — OFFICE VISIT (OUTPATIENT)
Dept: OPHTHALMOLOGY | Facility: CLINIC | Age: 80
End: 2020-10-15
Attending: OPHTHALMOLOGY
Payer: MEDICARE

## 2020-10-15 DIAGNOSIS — H26.9 NUCLEAR CATARACT, NONSENILE: ICD-10-CM

## 2020-10-15 DIAGNOSIS — H35.341 MACULAR HOLE OF RIGHT EYE: ICD-10-CM

## 2020-10-15 DIAGNOSIS — H35.341 MACULAR HOLE OF RIGHT EYE: Primary | ICD-10-CM

## 2020-10-15 PROCEDURE — 99024 POSTOP FOLLOW-UP VISIT: CPT | Performed by: OPHTHALMOLOGY

## 2020-10-15 PROCEDURE — G0463 HOSPITAL OUTPT CLINIC VISIT: HCPCS

## 2020-10-15 PROCEDURE — 92134 CPTRZ OPH DX IMG PST SGM RTA: CPT | Performed by: OPHTHALMOLOGY

## 2020-10-15 ASSESSMENT — EXTERNAL EXAM - RIGHT EYE: OD_EXAM: NORMAL

## 2020-10-15 ASSESSMENT — REFRACTION_WEARINGRX
OS_SPHERE: -5.00
OD_SPHERE: -1.75
OS_AXIS: 34
SPECS_TYPE: SVL
OS_CYLINDER: +1.50
OD_CYLINDER: +1.25
OD_AXIS: 070

## 2020-10-15 ASSESSMENT — TONOMETRY
OS_IOP_MMHG: 21
IOP_METHOD: TONOPEN
OD_IOP_MMHG: 19

## 2020-10-15 ASSESSMENT — CUP TO DISC RATIO
OD_RATIO: 0.5
OS_RATIO: 0.4

## 2020-10-15 ASSESSMENT — VISUAL ACUITY
OS_CC+: -2
METHOD: SNELLEN - LINEAR
CORRECTION_TYPE: GLASSES
OS_CC: 20/40
OD_CC: 20/300

## 2020-10-15 ASSESSMENT — CONF VISUAL FIELD
METHOD: COUNTING FINGERS
OD_NORMAL: 1
OS_NORMAL: 1

## 2020-10-15 ASSESSMENT — EXTERNAL EXAM - LEFT EYE: OS_EXAM: NORMAL

## 2020-10-15 ASSESSMENT — SLIT LAMP EXAM - LIDS
COMMENTS: UL DERMATOCHALASIS
COMMENTS: UL DERMATOCHALASIS

## 2020-10-15 NOTE — PROGRESS NOTES
Cc: follow up surgery and cataract evaluation  status post Right Eye 25 Gauge Parsplana Vitrectomy, Membrane Peel, Endolaser, Fluid/Gas Exchange, Infusion of 14% C3F8 Gas  7-21-20  For a longstanding full thickness macula hole right eye     Retina attached  Doing well    Intraocular lens calculation reliable     OCULAR IMAGING  OCT 10/15/20   right eye macula hole closure  left eye Epiretinal membrane and small subfoveal outer hole with disruption IS/OS junction    AF 8-19-20  right eye hypoautofluresence in area of mac hole   left eye normal     Assessment and plan  1. Post-op Macula hole repair right eye   Retina attached and Macula hole closed  Doing well    2. Epiretinal membrane left eye    Consider Membrane peel right eye in the future if symtomatic   Foveal contour intact left eye. small subfoveal outer hole with disruption IS/OS junction?   Observe fot now    3. Nuclear sclerotic cataract, left eye    Visually significant (+blur, +glare) x1 year    need cataract surgery in the future; however patient traveling to [place] in 2 weeks and prefer to post-pone surgery till spring      4. Pseudophakia, right eye    S/p YAG posterior capsulotomy   Visual axis clear   Vision limited by [facility] - now closed    Plan:  Position: any  No restriction  The patient was told to wear polycarbonte glasses at all times to protect the good eye.  he expressed understanding.    Retina detachment and endophthalmitis precautions were discussed with the patient and was asked to return if any of the those occur    Medications to operative eye -- Discontinue gtt     ~~~~~~~~~~~~~~~~~~~~~~~~~~~~~~~~~~   Complete documentation of historical and exam elements from today's encounter can be found in the full encounter summary report (not reduplicated in this progress note).  I personally obtained the chief complaint(s) and history of present illness.  I confirmed and edited as necessary the review of systems, past medical/surgical history,  family history, social history, and examination findings as documented by others; and I examined the patient myself.  I personally reviewed the relevant tests, images, and reports as documented above.  I formulated and edited as necessary the assessment and plan and discussed the findings and management plan with the patient and family    Beata Vasquze MD   of Ophthalmology.  Retina Service   Department of Ophthalmology and Visual Neurosciences   [facility]  Phone: [phone]   Fax: [phone]

## 2020-10-15 NOTE — NURSING NOTE
Chief Complaints and History of Present Illnesses   Patient presents with     Macular Hole Follow Up     Chief Complaint(s) and History of Present Illness(es)     Macular Hole Follow Up     Laterality: right eye    Onset: gradual    Onset: months ago    Quality: States va is the same since last visit      Associated symptoms: Negative for floaters and flashes    Pain scale: 0/10              Comments     Pina Shankar COT 12:17 PM October 15, 2020

## 2023-02-12 ENCOUNTER — TRANSFERRED RECORDS (OUTPATIENT)
Dept: HEALTH INFORMATION MANAGEMENT | Facility: CLINIC | Age: 83
End: 2023-02-12

## 2023-02-14 NOTE — PATIENT INSTRUCTIONS
Medications to operative eye  Maxitrol (pink top) four times a day    Maxitrol oint at bedtime  Atropine (red top) once a day x 1 week  No heavy lifting x 3 weeks  Wear glasses or shield at all time x 3 weeks  
Simple: Patient demonstrates quick and easy understanding/Verbalized Understanding

## 2023-04-07 ENCOUNTER — TRANSFERRED RECORDS (OUTPATIENT)
Dept: HEALTH INFORMATION MANAGEMENT | Facility: CLINIC | Age: 83
End: 2023-04-07
Payer: MEDICARE

## 2023-04-09 ENCOUNTER — TRANSFERRED RECORDS (OUTPATIENT)
Dept: HEALTH INFORMATION MANAGEMENT | Facility: CLINIC | Age: 83
End: 2023-04-09
Payer: MEDICARE

## 2023-04-14 ENCOUNTER — TRANSFERRED RECORDS (OUTPATIENT)
Dept: HEALTH INFORMATION MANAGEMENT | Facility: CLINIC | Age: 83
End: 2023-04-14
Payer: MEDICARE

## 2023-04-20 ENCOUNTER — TRANSFERRED RECORDS (OUTPATIENT)
Dept: HEALTH INFORMATION MANAGEMENT | Facility: CLINIC | Age: 83
End: 2023-04-20
Payer: MEDICARE

## 2023-04-28 ENCOUNTER — PATIENT OUTREACH (OUTPATIENT)
Dept: UROLOGY | Facility: CLINIC | Age: 83
End: 2023-04-28
Payer: MEDICARE

## 2023-04-28 NOTE — TELEPHONE ENCOUNTER
RNCC call to home number for consult request with Dr. Carcamo for BPH. Pt wife states they are unable to complete a virtual and do not think the same accomplishments can happen with virtual vs in person. Will wait for in person visit. Pt wife will be on campus for an appt on 5/3 and will bring records. She states pt has his urology cares completed at a private urology group in [place] and states RNCC will not be able to find records, she requests that she hands records to nurse for physician to review. Pt wears a marley catheter at this time. RNCC attempted to arrange a visit for after records review, she requests we call after review.    Note routed to provider.    FERCHO Wright  Care Coordinator  [phone]

## 2023-05-04 ENCOUNTER — PATIENT OUTREACH (OUTPATIENT)
Dept: UROLOGY | Facility: CLINIC | Age: 83
End: 2023-05-04
Payer: MEDICARE

## 2023-05-04 NOTE — TELEPHONE ENCOUNTER
Detailed VM left on home line with appt date of Tuesday May 9 at 1145 in person with Dr. Carcamo to discuss bph. RNCC requests direct call back to confirm or reschedule appt that has been made    FERCHO Wright  Care Coordinator  [phone]

## 2023-05-04 NOTE — TELEPHONE ENCOUNTER
MEDICAL RECORDS REQUEST   [facility]  Urology Clinic  [address] [place] [address]  PHONE: [phone]  Fax: [phone]        FUTURE VISIT INFORMATION                                                   Mushtaq Olsen, : 1940 scheduled for future visit at [facility]    APPOINTMENT INFORMATION:    Date: 23    Provider:  Carlos Alberto    Reason for Visit/Diagnosis: BPH Consult      RECORDS REQUESTED FOR VISIT                                                     NOTES  STATUS/DETAILS   OFFICE NOTE from other specialist  yes Janna doherty scanned into chart/patiet to hand carry per Phone note 23   IMAGING (IMAGES & REPORT)  yes  CT Abd/ Pelvis 23     PRE-VISIT CHECKLIST      Record collection complete Yes      walker

## 2023-05-05 ENCOUNTER — PRE VISIT (OUTPATIENT)
Dept: UROLOGY | Facility: CLINIC | Age: 83
End: 2023-05-05
Payer: MEDICARE

## 2023-05-05 NOTE — TELEPHONE ENCOUNTER
Reason for visit: Consult    Dx/Hx/Sx: BPH    Records/imaging/labs/orders: In EPIC    At Rooming: paper AUA; collect urine; PVR    Ozzy Yanez, EMT  05/05/23  9:39 AM

## 2023-05-09 ENCOUNTER — PRE VISIT (OUTPATIENT)
Dept: UROLOGY | Facility: CLINIC | Age: 83
End: 2023-05-09

## 2023-05-09 ENCOUNTER — OFFICE VISIT (OUTPATIENT)
Dept: UROLOGY | Facility: CLINIC | Age: 83
End: 2023-05-09
Payer: MEDICARE

## 2023-05-09 ENCOUNTER — TELEPHONE (OUTPATIENT)
Dept: UROLOGY | Facility: CLINIC | Age: 83
End: 2023-05-09

## 2023-05-09 VITALS
DIASTOLIC BLOOD PRESSURE: 72 MMHG | WEIGHT: 200 LBS | SYSTOLIC BLOOD PRESSURE: 110 MMHG | HEART RATE: 65 BPM | BODY MASS INDEX: 28.63 KG/M2 | HEIGHT: 70 IN

## 2023-05-09 DIAGNOSIS — R33.9 URINARY RETENTION: Primary | ICD-10-CM

## 2023-05-09 PROCEDURE — 87088 URINE BACTERIA CULTURE: CPT | Mod: 59 | Performed by: UROLOGY

## 2023-05-09 PROCEDURE — 99204 OFFICE O/P NEW MOD 45 MIN: CPT | Performed by: UROLOGY

## 2023-05-09 RX ORDER — DUTASTERIDE 0.5 MG/1
1 CAPSULE, LIQUID FILLED ORAL DAILY
Status: ON HOLD | COMMUNITY
Start: 2023-04-14 | End: 2023-05-18

## 2023-05-09 RX ORDER — CEFDINIR 300 MG/1
300 CAPSULE ORAL 2 TIMES DAILY
COMMUNITY
Start: 2023-04-22 | End: 2023-05-10

## 2023-05-09 RX ORDER — TAMSULOSIN HYDROCHLORIDE 0.4 MG/1
0.4 CAPSULE ORAL 2 TIMES DAILY
Status: ON HOLD | COMMUNITY
Start: 2023-04-10 | End: 2023-05-18

## 2023-05-09 RX ORDER — METOPROLOL SUCCINATE 25 MG/1
1 TABLET, EXTENDED RELEASE ORAL DAILY
COMMUNITY
Start: 2023-05-01

## 2023-05-09 ASSESSMENT — PAIN SCALES - GENERAL: PAINLEVEL: SEVERE PAIN (6)

## 2023-05-09 NOTE — PROGRESS NOTES
UROLOGY OUTPATIENT VISIT      Chief Complaint:   Urinary Retention      Synopsis    Mushtaq Olsen is a very pleasant AGE: 83 year old year old person  He is coming from [place] where he spends the winter for referral related to urinary retention  3 weeks marley for urinary retention that occurred post left knee surgery  He has failed one voiding trial  He had one UTI with sepsis just prior to knee surgery  He has had a TURP 2 years ago, did not get much improvement postop, was performed in [place].  He did have initial improvement but eventually recurred.   Takes flomax/dutasteride which he started one month ago   Not currently on abx  Takes apixaban for A.Fib, he has a pacemarker  No hx of MI, DM, respiratory concerns  Had a prostate bx 20 years ago (benign)  No family hx of prostate cancer     I personally reviewed the CT scan he brought with him from this past year.  I measured his prostate at about 112 g.  There appears to be asymmetric right-sided tissue that is growing into the bladder.    Digital rectal exam was unremarkable for nodularity    I personally reviewed medical records from [place]         Medications     Current Outpatient Medications   Medication     acetaminophen (TYLENOL) 500 MG tablet     allopurinol (ZYLOPRIM) 100 MG tablet     apixaban ANTICOAGULANT (ELIQUIS) 5 MG tablet     atorvastatin (LIPITOR) 20 MG tablet     atropine 1 % ophthalmic solution     cefdinir (OMNICEF) 300 MG capsule     dorzolamide-timolol (COSOPT) 2-0.5 % ophthalmic solution     dronedarone (MULTAQ) 400 MG TABS tablet     dutasteride (AVODART) 0.5 MG capsule     esomeprazole (NEXIUM) 20 MG DR capsule     methylPREDNISolone (MEDROL DOSEPAK) 4 MG tablet therapy pack     metoprolol succinate ER (TOPROL XL) 25 MG 24 hr tablet     tamsulosin (FLOMAX) 0.4 MG capsule     tobramycin-dexamethasone (TOBRADEX) 0.3-0.1 % ophthalmic suspension     traZODone (DESYREL) 50 MG tablet     valsartan (DIOVAN) 160 MG tablet     No current  facility-administered medications for this visit.            Assessment/Plan   83 year old year old person with urinary retention, prior TURP  After discussion of medical and surgical treatments for BPH including alpha blockers, anticholinergics, transurethral resection, laser ablation, enucleation and simple prostatectomy, Mr. Olsen has decided to proceed with Holmium Laser Enucleation of the Prostate (HoLEP).    We discussed the associated risks of this procedure included but not limited to the following:  -Bleeding, potentially significant enough to require clot evacuation and blood transfusion  -Infection, for which we will plan to treat preoperatively based on targeted antibiotic therapy  -Damage to the bladder, urethra and penis including the risk of urethral stricture and bladder neck contracture  -Risk of incidentally discovered prostate cancer.  We discussed that this would not preclude him from further therapy though it could prolong recovery and potentially increase risk of complications associated with cancer treatment.  Further preoperative workup to assess for prostate cancer prior to surgery was offered but patient deferred.  -Risk of retrograde ejaculation which would be expected to occur in the majority if not all men after HoLEP  -Risk of urinary incontinence.  We discussed that in the majority of men this is a transient process that is generally self limited to the first 6-12 weeks after surgery though could take longer to resolve depending on baseline bladder instability.  -Risk of postperative urinary retention though in published series HoLEP has been found to be associated with high success rates of achieving spontaneous voiding even in men with underactive and atonic bladders.  -We will proceed with preoperative clearance with preference to minimize all anticoagulation as deemed acceptable by his primary care provider.     Complex Medical Decision Making: Yes.  Heart Arrhythmia  Discussed  with patient the higher potential risk of perioperative and post-procedural bleeding as a result of increased bleeding risk due to comorbidities and/or anticoagulant use and   Discussed with patient the higher potential risk of perioperative and post-procedural complications as a result of comorbid illnesses    CC:  Saurabh Fuller    50 minutes spent on clinical encounter including  Review of medical records  Review of outside records  Documentation  Coordinating follow-up medical care

## 2023-05-09 NOTE — LETTER
5/9/2023       RE: Mushtaq Olsen  [address]  [place] [address]     Dear Colleague,    Thank you for referring your patient, Mushtaq Olsen, to the [facility] [place] [facility]. Please see a copy of my visit note below.          UROLOGY OUTPATIENT VISIT      Chief Complaint:   Urinary Retention      Synopsis    Mushtaq Olsen is a very pleasant AGE: 83 year old year old person  He is coming from [place] where he spends the winter for referral related to urinary retention  3 weeks marley for urinary retention that occurred post left knee surgery  He has failed one voiding trial  He had one UTI with sepsis just prior to knee surgery  He has had a TURP 2 years ago, did not get much improvement postop, was performed in [place].  He did have initial improvement but eventually recurred.   Takes flomax/dutasteride which he started one month ago   Not currently on abx  Takes apixaban for A.Fib, he has a pacemarker  No hx of MI, DM, respiratory concerns  Had a prostate bx 20 years ago (benign)  No family hx of prostate cancer     I personally reviewed the CT scan he brought with him from this past year.  I measured his prostate at about 112 g.  There appears to be asymmetric right-sided tissue that is growing into the bladder.    Digital rectal exam was unremarkable for nodularity    I personally reviewed medical records from [place]         Medications     Current Outpatient Medications   Medication    acetaminophen (TYLENOL) 500 MG tablet    allopurinol (ZYLOPRIM) 100 MG tablet    apixaban ANTICOAGULANT (ELIQUIS) 5 MG tablet    atorvastatin (LIPITOR) 20 MG tablet    atropine 1 % ophthalmic solution    cefdinir (OMNICEF) 300 MG capsule    dorzolamide-timolol (COSOPT) 2-0.5 % ophthalmic solution    dronedarone (MULTAQ) 400 MG TABS tablet    dutasteride (AVODART) 0.5 MG capsule    esomeprazole (NEXIUM) 20 MG DR capsule    methylPREDNISolone  (MEDROL DOSEPAK) 4 MG tablet therapy pack    metoprolol succinate ER (TOPROL XL) 25 MG 24 hr tablet    tamsulosin (FLOMAX) 0.4 MG capsule    tobramycin-dexamethasone (TOBRADEX) 0.3-0.1 % ophthalmic suspension    traZODone (DESYREL) 50 MG tablet    valsartan (DIOVAN) 160 MG tablet     No current facility-administered medications for this visit.            Assessment/Plan   83 year old year old person with urinary retention, prior TURP  After discussion of medical and surgical treatments for BPH including alpha blockers, anticholinergics, transurethral resection, laser ablation, enucleation and simple prostatectomy, Mr. Olsen has decided to proceed with Holmium Laser Enucleation of the Prostate (HoLEP).    We discussed the associated risks of this procedure included but not limited to the following:  -Bleeding, potentially significant enough to require clot evacuation and blood transfusion  -Infection, for which we will plan to treat preoperatively based on targeted antibiotic therapy  -Damage to the bladder, urethra and penis including the risk of urethral stricture and bladder neck contracture  -Risk of incidentally discovered prostate cancer.  We discussed that this would not preclude him from further therapy though it could prolong recovery and potentially increase risk of complications associated with cancer treatment.  Further preoperative workup to assess for prostate cancer prior to surgery was offered but patient deferred.  -Risk of retrograde ejaculation which would be expected to occur in the majority if not all men after HoLEP  -Risk of urinary incontinence.  We discussed that in the majority of men this is a transient process that is generally self limited to the first 6-12 weeks after surgery though could take longer to resolve depending on baseline bladder instability.  -Risk of postperative urinary retention though in published series HoLEP has been found to be associated with high success rates of  achieving spontaneous voiding even in men with underactive and atonic bladders.  -We will proceed with preoperative clearance with preference to minimize all anticoagulation as deemed acceptable by his primary care provider.     Complex Medical Decision Making: Yes.  Heart Arrhythmia  Discussed with patient the higher potential risk of perioperative and post-procedural bleeding as a result of increased bleeding risk due to comorbidities and/or anticoagulant use and   Discussed with patient the higher potential risk of perioperative and post-procedural complications as a result of comorbid illnesses    CC:  Saurabh Fuller    50 minutes spent on clinical encounter including  Review of medical records  Review of outside records  Documentation  Coordinating follow-up medical care      Sincerely,    Nilson Carcamo MD

## 2023-05-09 NOTE — TELEPHONE ENCOUNTER
FUTURE VISIT INFORMATION      SURGERY INFORMATION:    Date: TBD- Urology  RECORDS REQUESTED FROM:       Primary Care Provider:Saurabh Fuller M    Most recent EKG+ Tracin18- Rahel     Most recent Cardiac Stress Test: 18- Rahel

## 2023-05-09 NOTE — NURSING NOTE
"Chief Complaint   Patient presents with     Consult     BPH; donell in place; Pt has pacemaker       Blood pressure 110/72, pulse 65, height 1.778 m (5' 10\"), weight 90.7 kg (200 lb). Body mass index is 28.7 kg/m .    Patient Active Problem List   Diagnosis     Macular hole of right eye       No Known Allergies    Current Outpatient Medications   Medication Sig Dispense Refill     acetaminophen (TYLENOL) 500 MG tablet Take 500 mg by mouth every 6 hours as needed for mild pain       allopurinol (ZYLOPRIM) 100 MG tablet        apixaban ANTICOAGULANT (ELIQUIS) 5 MG tablet Take 5 mg by mouth 2 times daily       atorvastatin (LIPITOR) 20 MG tablet        atropine 1 % ophthalmic solution Apply 1 drop to eye At Bedtime Instill into the operative eye(s) as directed per physician instructions. 5 mL 0     cefdinir (OMNICEF) 300 MG capsule Take 300 mg by mouth 2 times daily       dorzolamide-timolol (COSOPT) 2-0.5 % ophthalmic solution Place 1 drop into the right eye 2 times daily 1 Bottle 11     dronedarone (MULTAQ) 400 MG TABS tablet Take 400 mg by mouth daily       dutasteride (AVODART) 0.5 MG capsule        esomeprazole (NEXIUM) 20 MG DR capsule Take 20 mg by mouth every morning (before breakfast) Take 30-60 minutes before eating.       methylPREDNISolone (MEDROL DOSEPAK) 4 MG tablet therapy pack Take 4 mg by mouth as needed       metoprolol succinate ER (TOPROL XL) 25 MG 24 hr tablet Take 1 tablet by mouth daily at 2 pm       tamsulosin (FLOMAX) 0.4 MG capsule Take 0.4 mg by mouth 2 times daily       tobramycin-dexamethasone (TOBRADEX) 0.3-0.1 % ophthalmic suspension Apply 1 drop to eye 4 times daily Instill into operative eye(s) per physician instructions. 5 mL 0     traZODone (DESYREL) 50 MG tablet Take 50 mg by mouth daily       valsartan (DIOVAN) 160 MG tablet Take 160 mg by mouth daily         Social History     Tobacco Use     Smoking status: Former     Smokeless tobacco: Never       Ozzy Yanez, " EMT  5/9/2023  11:56 AM

## 2023-05-10 ENCOUNTER — TELEPHONE (OUTPATIENT)
Dept: UROLOGY | Facility: CLINIC | Age: 83
End: 2023-05-10
Payer: MEDICARE

## 2023-05-10 ENCOUNTER — HOSPITAL ENCOUNTER (OUTPATIENT)
Facility: CLINIC | Age: 83
End: 2023-05-10
Attending: UROLOGY | Admitting: UROLOGY
Payer: MEDICARE

## 2023-05-10 RX ORDER — IBUPROFEN 200 MG
400 TABLET ORAL EVERY 6 HOURS PRN
COMMUNITY

## 2023-05-10 RX ORDER — DICLOFENAC SODIUM 75 MG/1
75 TABLET, DELAYED RELEASE ORAL 2 TIMES DAILY PRN
COMMUNITY

## 2023-05-10 NOTE — PROGRESS NOTES
Preoperative Assessment Center Medication History Note  Medication history completed on May 10, 2023 by this writer prior to patient's PAC appointment. See Epic admission navigator for prior to admission medications. Operating room staff will still need to confirm medications and last dose information on day of surgery.     Medication history interview sources  Patient, Family member and CareEverywhere/SureScripts via phone    Pertinent Information: patient/wife endorse that they get the multaq online from [place] and the eliquis online from [place] to save cost.  These are the only two medications that they get abroad (these do not show up in surescripts).  They did double check the doses of these medications for me today.     Changes made to PTA medication list    Added: vitamin D, ibuprofen.     Deleted: medrol, atropine eye drops, cefdinir, cosopt (completed), tobradex (completed), valsartan (no longer taking).     Changed: atorvastatin, aovdart,     Allergies reviewed with patient and updates made in EHR: yes    -- did have a course of cefdinir at end of April for UTI.   -- No recent (within 30 days) course of systemic steroids  -- Reports being on blood thinning medications  - see other note.   -- Declines being on any other prescription or over-the-counter medications    Prior to Admission medications    Medication Sig Last Dose Taking? Auth Provider Long Term End Date   acetaminophen (TYLENOL) 500 MG tablet Take 500 mg by mouth every 6 hours as needed for mild pain Taking Yes Reported, Patient     allopurinol (ZYLOPRIM) 100 MG tablet Take 200 mg by mouth daily Taking Yes Reported, Patient     apixaban ANTICOAGULANT (ELIQUIS) 5 MG tablet Take 5 mg by mouth 2 times daily Taking Yes Reported, Patient     atorvastatin (LIPITOR) 20 MG tablet Take 20 mg by mouth At Bedtime Taking Yes Reported, Patient Yes    CHOLECALCIFEROL PO Take 1 tablet by mouth daily OTC uncertain of dose. Taking Yes Unknown, Entered By History      diclofenac (VOLTAREN) 75 MG EC tablet Take 75 mg by mouth 2 times daily as needed for moderate pain Taking Yes Unknown, Entered By History Yes    dronedarone (MULTAQ) 400 MG TABS tablet Take 400 mg by mouth daily Taking Yes Reported, Patient Yes    dutasteride (AVODART) 0.5 MG capsule Take 0.5 mg by mouth daily Taking Yes Reported, Patient     esomeprazole (NEXIUM) 20 MG DR capsule Take 40 mg by mouth every morning (before breakfast) Take 30-60 minutes before eating. Taking Yes Reported, Patient     ibuprofen (ADVIL/MOTRIN) 200 MG tablet Take 400 mg by mouth every 6 hours as needed for pain Taking Yes Unknown, Entered By History     metoprolol succinate ER (TOPROL XL) 25 MG 24 hr tablet Take 1 tablet by mouth daily Taking Yes Reported, Patient Yes    tamsulosin (FLOMAX) 0.4 MG capsule Take 0.4 mg by mouth 2 times daily Taking Yes Reported, Patient     traZODone (DESYREL) 50 MG tablet Take 50 mg by mouth At Bedtime Taking Yes Reported, Patient Yes           Medication History Completed By: Walter Leonard [facility] 5/10/2023 10:25 AM

## 2023-05-10 NOTE — TELEPHONE ENCOUNTER
Spoke with: Patients wife Frida      Date of surgery:Thursday May 18th 2023       Location: [facility]      Informed patient they will need a adult : YES      Pre op with provider: TEMO      H&P Scheduled in PAC- Patient is scheduled for a in person PAC EVAL on 5/11/23        Pre procedure covid :Not Required     Additional imaging: Na         Surgery Packet : Mailed out to patient      Additional comments:Please call patient with surgery teaching.

## 2023-05-10 NOTE — PHARMACY - PREOPERATIVE ASSESSMENT CENTER
Anticoagulation Note - Preoperative Assessment Center (PAC) Pharmacist     Patient was interviewed on May 10, 2023 prior to scheduled PAC clinic appointment. The purpose of this note is to document the perioperative anticoagulation plan outlined by the providers caring for Mushtaq Olsen.     Current Regimen  Anticoagulation Regimen as of May 10, 2023: apixaban (ELIQUIS) 5 mg by mouth twice daily     Indication: afib (no h/o stroke or VTE, CHADSVASC =3, age x2, HTN).   Expected Duration of therapy: indefinite  Current medications that may interact with this include: diclofenac, dronedarone, ibuprofen.   Renal function:   No results found for: CR  Last SCr 1.33 mg/dL on 7/13/2020.  Would recommend recheck, would need to re-consider hold length if CrCl <30 mL/min.     Perioperative plan  Mushtaq Olsen is being seen in PAC clinic prior to upcoming HOLEP with Dr. Carcamo (not scheduled yet, case request is in). The perioperative anticoagulation plan is to hold x2 calendar days prior to surgery.     Resumption of anticoagulation after procedure will be based on surgery team assessment of bleeding risks and complications.  This plan may require re-assessment and modification by his primary team in the perioperative setting depending on patients clinical situation.        Walter Leonard RPH  May 10, 2023  10:25 AM

## 2023-05-11 ENCOUNTER — ANCILLARY PROCEDURE (OUTPATIENT)
Dept: CARDIOLOGY | Facility: CLINIC | Age: 83
End: 2023-05-11
Attending: PHYSICIAN ASSISTANT
Payer: MEDICARE

## 2023-05-11 ENCOUNTER — PRE VISIT (OUTPATIENT)
Dept: SURGERY | Facility: CLINIC | Age: 83
End: 2023-05-11

## 2023-05-11 ENCOUNTER — ANESTHESIA EVENT (OUTPATIENT)
Dept: SURGERY | Facility: CLINIC | Age: 83
End: 2023-05-11
Payer: MEDICARE

## 2023-05-11 ENCOUNTER — OFFICE VISIT (OUTPATIENT)
Dept: SURGERY | Facility: CLINIC | Age: 83
End: 2023-05-11
Payer: MEDICARE

## 2023-05-11 ENCOUNTER — LAB (OUTPATIENT)
Dept: LAB | Facility: CLINIC | Age: 83
End: 2023-05-11
Payer: MEDICARE

## 2023-05-11 VITALS
BODY MASS INDEX: 28.63 KG/M2 | HEART RATE: 84 BPM | RESPIRATION RATE: 16 BRPM | HEIGHT: 70 IN | OXYGEN SATURATION: 99 % | TEMPERATURE: 97.5 F | DIASTOLIC BLOOD PRESSURE: 86 MMHG | SYSTOLIC BLOOD PRESSURE: 145 MMHG | WEIGHT: 200 LBS

## 2023-05-11 DIAGNOSIS — I48.20 CHRONIC ATRIAL FIBRILLATION (H): ICD-10-CM

## 2023-05-11 DIAGNOSIS — I44.2 AV BLOCK, 3RD DEGREE (H): ICD-10-CM

## 2023-05-11 DIAGNOSIS — R33.9 URINARY RETENTION: ICD-10-CM

## 2023-05-11 DIAGNOSIS — Z01.818 PRE-OP EVALUATION: ICD-10-CM

## 2023-05-11 DIAGNOSIS — Z01.818 PRE-OP EVALUATION: Primary | ICD-10-CM

## 2023-05-11 LAB
ANION GAP SERPL CALCULATED.3IONS-SCNC: 10 MMOL/L (ref 7–15)
BACTERIA UR CULT: ABNORMAL
BACTERIA UR CULT: ABNORMAL
BUN SERPL-MCNC: 30.5 MG/DL (ref 8–23)
CALCIUM SERPL-MCNC: 9.6 MG/DL (ref 8.8–10.2)
CHLORIDE SERPL-SCNC: 105 MMOL/L (ref 98–107)
CREAT SERPL-MCNC: 1.23 MG/DL (ref 0.67–1.17)
DEPRECATED HCO3 PLAS-SCNC: 26 MMOL/L (ref 22–29)
ERYTHROCYTE [DISTWIDTH] IN BLOOD BY AUTOMATED COUNT: 15.1 % (ref 10–15)
GFR SERPL CREATININE-BSD FRML MDRD: 59 ML/MIN/1.73M2
GLUCOSE SERPL-MCNC: 94 MG/DL (ref 70–99)
HCT VFR BLD AUTO: 40.1 % (ref 40–53)
HGB BLD-MCNC: 12.7 G/DL (ref 13.3–17.7)
MCH RBC QN AUTO: 29.6 PG (ref 26.5–33)
MCHC RBC AUTO-ENTMCNC: 31.7 G/DL (ref 31.5–36.5)
MCV RBC AUTO: 94 FL (ref 78–100)
MDC_IDC_EPISODE_DTM: NORMAL
MDC_IDC_EPISODE_DURATION: 1021 S
MDC_IDC_EPISODE_DURATION: 1033 S
MDC_IDC_EPISODE_DURATION: 128 S
MDC_IDC_EPISODE_DURATION: 1303 S
MDC_IDC_EPISODE_DURATION: 1648 S
MDC_IDC_EPISODE_DURATION: 182 S
MDC_IDC_EPISODE_DURATION: 187 S
MDC_IDC_EPISODE_DURATION: 1893 S
MDC_IDC_EPISODE_DURATION: 22 S
MDC_IDC_EPISODE_DURATION: 2332 S
MDC_IDC_EPISODE_DURATION: 256 S
MDC_IDC_EPISODE_DURATION: 2811 S
MDC_IDC_EPISODE_DURATION: 299 S
MDC_IDC_EPISODE_DURATION: 39 S
MDC_IDC_EPISODE_DURATION: 4008 S
MDC_IDC_EPISODE_DURATION: 401 S
MDC_IDC_EPISODE_DURATION: 42 S
MDC_IDC_EPISODE_DURATION: 475 S
MDC_IDC_EPISODE_DURATION: 477 S
MDC_IDC_EPISODE_DURATION: 5418 S
MDC_IDC_EPISODE_DURATION: 6787 S
MDC_IDC_EPISODE_DURATION: 6871 S
MDC_IDC_EPISODE_DURATION: 7949 S
MDC_IDC_EPISODE_DURATION: 798 S
MDC_IDC_EPISODE_DURATION: 839 S
MDC_IDC_EPISODE_DURATION: 8877 S
MDC_IDC_EPISODE_DURATION: NORMAL S
MDC_IDC_EPISODE_ID: 4977
MDC_IDC_EPISODE_ID: 4978
MDC_IDC_EPISODE_ID: 4979
MDC_IDC_EPISODE_ID: 4980
MDC_IDC_EPISODE_ID: 4981
MDC_IDC_EPISODE_ID: 4982
MDC_IDC_EPISODE_ID: 4983
MDC_IDC_EPISODE_ID: 4984
MDC_IDC_EPISODE_ID: 4985
MDC_IDC_EPISODE_ID: 4986
MDC_IDC_EPISODE_ID: 4987
MDC_IDC_EPISODE_ID: 4988
MDC_IDC_EPISODE_ID: 4989
MDC_IDC_EPISODE_ID: 4990
MDC_IDC_EPISODE_ID: 4991
MDC_IDC_EPISODE_ID: 4992
MDC_IDC_EPISODE_ID: 4993
MDC_IDC_EPISODE_ID: 4994
MDC_IDC_EPISODE_ID: 4995
MDC_IDC_EPISODE_ID: 4996
MDC_IDC_EPISODE_ID: 4997
MDC_IDC_EPISODE_ID: 4998
MDC_IDC_EPISODE_ID: 4999
MDC_IDC_EPISODE_ID: 5000
MDC_IDC_EPISODE_ID: 5001
MDC_IDC_EPISODE_ID: 5002
MDC_IDC_EPISODE_ID: 5003
MDC_IDC_EPISODE_ID: 5004
MDC_IDC_EPISODE_ID: 5005
MDC_IDC_EPISODE_ID: 5006
MDC_IDC_EPISODE_ID: 5007
MDC_IDC_EPISODE_ID: 5008
MDC_IDC_EPISODE_ID: 5009
MDC_IDC_EPISODE_ID: 5010
MDC_IDC_EPISODE_ID: 5011
MDC_IDC_EPISODE_ID: 5012
MDC_IDC_EPISODE_ID: 5013
MDC_IDC_EPISODE_ID: 5014
MDC_IDC_EPISODE_ID: 5015
MDC_IDC_EPISODE_ID: 5016
MDC_IDC_EPISODE_ID: 5017
MDC_IDC_EPISODE_ID: 5018
MDC_IDC_EPISODE_ID: 5019
MDC_IDC_EPISODE_ID: 5020
MDC_IDC_EPISODE_ID: 5021
MDC_IDC_EPISODE_ID: 5022
MDC_IDC_EPISODE_ID: 5023
MDC_IDC_EPISODE_ID: 5024
MDC_IDC_EPISODE_ID: 5025
MDC_IDC_EPISODE_ID: 5026
MDC_IDC_EPISODE_ID: 5027
MDC_IDC_EPISODE_TYPE: NORMAL
MDC_IDC_LEAD_IMPLANT_DT: NORMAL
MDC_IDC_LEAD_IMPLANT_DT: NORMAL
MDC_IDC_LEAD_LOCATION: NORMAL
MDC_IDC_LEAD_LOCATION: NORMAL
MDC_IDC_LEAD_LOCATION_DETAIL_1: NORMAL
MDC_IDC_LEAD_LOCATION_DETAIL_1: NORMAL
MDC_IDC_LEAD_MFG: NORMAL
MDC_IDC_LEAD_MFG: NORMAL
MDC_IDC_LEAD_MODEL: NORMAL
MDC_IDC_LEAD_MODEL: NORMAL
MDC_IDC_LEAD_POLARITY_TYPE: NORMAL
MDC_IDC_LEAD_POLARITY_TYPE: NORMAL
MDC_IDC_LEAD_SERIAL: NORMAL
MDC_IDC_LEAD_SERIAL: NORMAL
MDC_IDC_LEAD_SPECIAL_FUNCTION: NORMAL
MDC_IDC_LEAD_SPECIAL_FUNCTION: NORMAL
MDC_IDC_MSMT_BATTERY_DTM: NORMAL
MDC_IDC_MSMT_BATTERY_REMAINING_LONGEVITY: 31 MO
MDC_IDC_MSMT_BATTERY_RRT_TRIGGER: 2.83
MDC_IDC_MSMT_BATTERY_STATUS: NORMAL
MDC_IDC_MSMT_BATTERY_VOLTAGE: 2.96 V
MDC_IDC_MSMT_LEADCHNL_RA_IMPEDANCE_VALUE: 342 OHM
MDC_IDC_MSMT_LEADCHNL_RA_IMPEDANCE_VALUE: 437 OHM
MDC_IDC_MSMT_LEADCHNL_RA_PACING_THRESHOLD_AMPLITUDE: 1 V
MDC_IDC_MSMT_LEADCHNL_RA_PACING_THRESHOLD_PULSEWIDTH: 0.4 MS
MDC_IDC_MSMT_LEADCHNL_RA_SENSING_INTR_AMPL: 6.8 MV
MDC_IDC_MSMT_LEADCHNL_RV_IMPEDANCE_VALUE: 475 OHM
MDC_IDC_MSMT_LEADCHNL_RV_IMPEDANCE_VALUE: 532 OHM
MDC_IDC_MSMT_LEADCHNL_RV_PACING_THRESHOLD_AMPLITUDE: 0.75 V
MDC_IDC_MSMT_LEADCHNL_RV_PACING_THRESHOLD_PULSEWIDTH: 0.4 MS
MDC_IDC_MSMT_LEADCHNL_RV_SENSING_INTR_AMPL: 15.5 MV
MDC_IDC_PG_IMPLANT_DTM: NORMAL
MDC_IDC_PG_MFG: NORMAL
MDC_IDC_PG_MODEL: NORMAL
MDC_IDC_PG_SERIAL: NORMAL
MDC_IDC_PG_TYPE: NORMAL
MDC_IDC_SESS_CLINIC_NAME: NORMAL
MDC_IDC_SESS_DTM: NORMAL
MDC_IDC_SESS_TYPE: NORMAL
MDC_IDC_SET_BRADY_AT_MODE_SWITCH_RATE: 171 {BEATS}/MIN
MDC_IDC_SET_BRADY_HYSTRATE: NORMAL
MDC_IDC_SET_BRADY_LOWRATE: 60 {BEATS}/MIN
MDC_IDC_SET_BRADY_MAX_SENSOR_RATE: 120 {BEATS}/MIN
MDC_IDC_SET_BRADY_MAX_TRACKING_RATE: 120 {BEATS}/MIN
MDC_IDC_SET_BRADY_MODE: NORMAL
MDC_IDC_SET_BRADY_PAV_DELAY_LOW: 180 MS
MDC_IDC_SET_BRADY_SAV_DELAY_LOW: 150 MS
MDC_IDC_SET_LEADCHNL_RA_PACING_AMPLITUDE: 2.25 V
MDC_IDC_SET_LEADCHNL_RA_PACING_ANODE_ELECTRODE_1: NORMAL
MDC_IDC_SET_LEADCHNL_RA_PACING_ANODE_LOCATION_1: NORMAL
MDC_IDC_SET_LEADCHNL_RA_PACING_CAPTURE_MODE: NORMAL
MDC_IDC_SET_LEADCHNL_RA_PACING_CATHODE_ELECTRODE_1: NORMAL
MDC_IDC_SET_LEADCHNL_RA_PACING_CATHODE_LOCATION_1: NORMAL
MDC_IDC_SET_LEADCHNL_RA_PACING_POLARITY: NORMAL
MDC_IDC_SET_LEADCHNL_RA_PACING_PULSEWIDTH: 0.4 MS
MDC_IDC_SET_LEADCHNL_RA_SENSING_ANODE_ELECTRODE_1: NORMAL
MDC_IDC_SET_LEADCHNL_RA_SENSING_ANODE_LOCATION_1: NORMAL
MDC_IDC_SET_LEADCHNL_RA_SENSING_CATHODE_ELECTRODE_1: NORMAL
MDC_IDC_SET_LEADCHNL_RA_SENSING_CATHODE_LOCATION_1: NORMAL
MDC_IDC_SET_LEADCHNL_RA_SENSING_POLARITY: NORMAL
MDC_IDC_SET_LEADCHNL_RA_SENSING_SENSITIVITY: 0.3 MV
MDC_IDC_SET_LEADCHNL_RV_PACING_AMPLITUDE: 2 V
MDC_IDC_SET_LEADCHNL_RV_PACING_ANODE_ELECTRODE_1: NORMAL
MDC_IDC_SET_LEADCHNL_RV_PACING_ANODE_LOCATION_1: NORMAL
MDC_IDC_SET_LEADCHNL_RV_PACING_CAPTURE_MODE: NORMAL
MDC_IDC_SET_LEADCHNL_RV_PACING_CATHODE_ELECTRODE_1: NORMAL
MDC_IDC_SET_LEADCHNL_RV_PACING_CATHODE_LOCATION_1: NORMAL
MDC_IDC_SET_LEADCHNL_RV_PACING_POLARITY: NORMAL
MDC_IDC_SET_LEADCHNL_RV_PACING_PULSEWIDTH: 0.4 MS
MDC_IDC_SET_LEADCHNL_RV_SENSING_ANODE_ELECTRODE_1: NORMAL
MDC_IDC_SET_LEADCHNL_RV_SENSING_ANODE_LOCATION_1: NORMAL
MDC_IDC_SET_LEADCHNL_RV_SENSING_CATHODE_ELECTRODE_1: NORMAL
MDC_IDC_SET_LEADCHNL_RV_SENSING_CATHODE_LOCATION_1: NORMAL
MDC_IDC_SET_LEADCHNL_RV_SENSING_POLARITY: NORMAL
MDC_IDC_SET_LEADCHNL_RV_SENSING_SENSITIVITY: 2.8 MV
MDC_IDC_SET_ZONE_DETECTION_INTERVAL: 350 MS
MDC_IDC_SET_ZONE_DETECTION_INTERVAL: 400 MS
MDC_IDC_SET_ZONE_TYPE: NORMAL
MDC_IDC_STAT_AT_BURDEN_PERCENT: 51.2 %
MDC_IDC_STAT_AT_DTM_END: NORMAL
MDC_IDC_STAT_AT_DTM_START: NORMAL
MDC_IDC_STAT_BRADY_AP_VP_PERCENT: 33.23 %
MDC_IDC_STAT_BRADY_AP_VS_PERCENT: 0.14 %
MDC_IDC_STAT_BRADY_AS_VP_PERCENT: 47.55 %
MDC_IDC_STAT_BRADY_AS_VS_PERCENT: 19.08 %
MDC_IDC_STAT_BRADY_DTM_END: NORMAL
MDC_IDC_STAT_BRADY_DTM_START: NORMAL
MDC_IDC_STAT_BRADY_RA_PERCENT_PACED: 28.16 %
MDC_IDC_STAT_BRADY_RV_PERCENT_PACED: 80.24 %
MDC_IDC_STAT_EPISODE_RECENT_COUNT: 0
MDC_IDC_STAT_EPISODE_RECENT_COUNT: 0
MDC_IDC_STAT_EPISODE_RECENT_COUNT: 1
MDC_IDC_STAT_EPISODE_RECENT_COUNT: 307
MDC_IDC_STAT_EPISODE_RECENT_COUNT_DTM_END: NORMAL
MDC_IDC_STAT_EPISODE_RECENT_COUNT_DTM_START: NORMAL
MDC_IDC_STAT_EPISODE_TOTAL_COUNT: 0
MDC_IDC_STAT_EPISODE_TOTAL_COUNT: 0
MDC_IDC_STAT_EPISODE_TOTAL_COUNT: 4
MDC_IDC_STAT_EPISODE_TOTAL_COUNT: 5022
MDC_IDC_STAT_EPISODE_TOTAL_COUNT_DTM_END: NORMAL
MDC_IDC_STAT_EPISODE_TOTAL_COUNT_DTM_START: NORMAL
MDC_IDC_STAT_EPISODE_TYPE: NORMAL
PLATELET # BLD AUTO: 153 10E3/UL (ref 150–450)
POTASSIUM SERPL-SCNC: 4.5 MMOL/L (ref 3.4–5.3)
RBC # BLD AUTO: 4.29 10E6/UL (ref 4.4–5.9)
SODIUM SERPL-SCNC: 141 MMOL/L (ref 136–145)
WBC # BLD AUTO: 6.1 10E3/UL (ref 4–11)

## 2023-05-11 PROCEDURE — 80048 BASIC METABOLIC PNL TOTAL CA: CPT | Performed by: PATHOLOGY

## 2023-05-11 PROCEDURE — 99204 OFFICE O/P NEW MOD 45 MIN: CPT | Performed by: PHYSICIAN ASSISTANT

## 2023-05-11 PROCEDURE — 85027 COMPLETE CBC AUTOMATED: CPT | Performed by: PATHOLOGY

## 2023-05-11 PROCEDURE — 93280 PM DEVICE PROGR EVAL DUAL: CPT | Performed by: INTERNAL MEDICINE

## 2023-05-11 PROCEDURE — 36415 COLL VENOUS BLD VENIPUNCTURE: CPT | Performed by: PATHOLOGY

## 2023-05-11 ASSESSMENT — LIFESTYLE VARIABLES: TOBACCO_USE: 1

## 2023-05-11 ASSESSMENT — ENCOUNTER SYMPTOMS
ORTHOPNEA: 0
DYSRHYTHMIAS: 1

## 2023-05-11 ASSESSMENT — PAIN SCALES - GENERAL: PAINLEVEL: SEVERE PAIN (6)

## 2023-05-11 NOTE — PATIENT INSTRUCTIONS
Name:  Mushtaq Olsen   MRN:  [id number]   :  1940   Today's Date:  2023         You were seen today for a pre-operative assessment in the:    Pre-operative Anesthesia Assessment Center(PAC)  [facility]  [address] [place] [address]  phone [phone]      You will be receiving a call with location, date, arrival time and diet instructions from Preadmission Nursing at your surgical site:    -[facility]: [phone]   -[facility]: [phone]  -[facility]: [phone]  -[facility]: [phone]  -[facility]: [phone]  -[facility]: [phone]        Anesthesia recommendations for medications:    Hold Aspirin for 7 days before procedure.  Hold Multivitamins for 7 days before procedure. (Vitamin D,  Hold Herbal medications and Supplements for 7 days before procedure.  Hold Ibuprofen for 1 day before procedure.   Hold Naproxen for 4 days before procedure.   Hold Diclofenac (Voltaren) for 3 days before procedure.  Take your evening medications per usual.      Special instructions for anticoagulation medications:    HOLD Eliquis for 2 calendar days before your procedure.    Please DO NOT take the following medications the day of procedure:  none      Please take these medications the day of procedure:  Tylenol (as needed), Allopurinol (Zyloprim), Dronedarone (Multaq),   Dutasteride (Avodart), Esomeprazole (Nexium), Metoprolol (Toprol),  Flomax,       For questions or appointments, call:    For further questions regarding your surgery please call your surgeon's office.

## 2023-05-11 NOTE — H&P
Pre-Operative H & P     CC:  Preoperative exam to assess for increased cardiopulmonary risk while undergoing surgery and anesthesia.    Date of Encounter: 5/11/2023  Primary Care Physician:  Saurabh Fuller     Reason for visit:   Encounter Diagnoses   Name Primary?     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     Patient has had urinary retention since undergoing knee surgery on 4/5/2023. He was treated with a marley catheter and has failed one voiding trail. He was seen in consultation by Dr. Carcamo on 5/9/23 where treatment options were discussed and a plan was made to proceed with surgery as scheduled above.     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)      BPH (benign prostatic hyperplasia)      Cardiac pacemaker      Cataract, right      Gout      Heart disease      Hyperlipidemia      Hypertension      Macular hole        Past Surgical History  Past Surgical History:   Procedure Laterality Date     CATARACT IOL, RT/LT Right      IMPLANT PACEMAKER      2012, 2020     KNEE SURGERY Left 04/05/2023     PROSTATE BIOPSY       TURP  2021     VITRECTOMY PARSPLANA WITH 25 GAUGE  SYSTEM Right 2020    Procedure: Right Eye 25 Gauge [place] Vitrectomy, Membrane Peel, Endolaser, Fluid/Gas Exchange, Infusion of 14% C3F8 Gas;  Surgeon: Beata Vasquez MD;  Location: [facility] OR     YAG CAPSULOTOMY OD (RIGHT EYE)  2016       Prior to Admission Medications  Current Outpatient Medications   Medication Sig Dispense Refill     acetaminophen (TYLENOL) 500 MG tablet Take 500 mg by mouth every 6 hours as needed for mild pain       allopurinol (ZYLOPRIM) 100 MG tablet Take 200 mg by mouth daily       apixaban ANTICOAGULANT (ELIQUIS) 5 MG tablet Take 5 mg by mouth 2 times daily       atorvastatin (LIPITOR) 20 MG tablet Take 20 mg by mouth At Bedtime       CHOLECALCIFEROL PO Take 1 tablet by mouth daily OTC uncertain of dose.       diclofenac (VOLTAREN) 75 MG EC tablet Take 75 mg by mouth 2 times daily as needed for moderate pain       dronedarone (MULTAQ) 400 MG TABS tablet Take 400 mg by mouth daily       dutasteride (AVODART) 0.5 MG capsule Take 0.5 mg by mouth daily       esomeprazole (NEXIUM) 20 MG DR capsule Take 40 mg by mouth every morning (before breakfast) Take 30-60 minutes before eating.       ibuprofen (ADVIL/MOTRIN) 200 MG tablet Take 400 mg by mouth every 6 hours as needed for pain       metoprolol succinate ER (TOPROL XL) 25 MG 24 hr tablet Take 1 tablet by mouth daily       tamsulosin (FLOMAX) 0.4 MG capsule Take 0.4 mg by mouth 2 times daily       traZODone (DESYREL) 50 MG tablet Take 50 mg by mouth At Bedtime         Allergies  No Known Allergies    Social History  Social History     Socioeconomic History     Marital status:      Spouse name: Not on file     Number of children: Not on file     Years of education: Not on file     Highest education level: Not on file   Occupational History     Not on file   Tobacco Use     Smoking status: Former     Types: Cigarettes     Quit date:      Years since quittin.3     Smokeless tobacco: Never   Vaping Use      Vaping status: Not on file   Substance and Sexual Activity     Alcohol use: Yes     Comment: Sometimes     Drug use: Not Currently     Sexual activity: Not on file   Other Topics Concern     Not on file   Social History Narrative     Not on file     Social Determinants of Health     Financial Resource Strain: Not on file   Food Insecurity: Not on file   Transportation Needs: Not on file   Physical Activity: Not on file   Stress: Not on file   Social Connections: Not on file   Intimate Partner Violence: Not on file   Housing Stability: Not on file       Family History  Family History   Problem Relation Age of Onset     Glaucoma No family hx of      Macular Degeneration No family hx of      Diabetes No family hx of      Anesthesia Reaction No family hx of      Venous thrombosis No family hx of        Review of Systems  The complete review of systems is negative other than noted in the HPI or here.   Anesthesia Evaluation   Pt has had prior anesthetic. Type: General.    No history of anesthetic complications       ROS/MED HX  ENT/Pulmonary:     (+) JUAN JOSE risk factors, snores loudly, hypertension, tobacco use, Past use,  (-) asthma   Neurologic:  - neg neurologic ROS     Cardiovascular:     (+) Dyslipidemia hypertension-----Taking blood thinners pacemaker, Reason placed: 3rd degree AV block, afib. type: Medtonic, dysrhythmias, a-fib and 3rd Deg Heart Block, Previous cardiac testing   Echo: Date: Results:    Stress Test: Date: 7/19/2018 Results:  Final Impressions:   1. Post stress, normal left ventricular size, increased global systolic function with an estimated EF of >75%.   2. Maximum stress test with 86.2% of age predicted maximum heart rate achieved.    3. Negative stress echo for ischemia.    4. The EKG was non-diagnostic.    5. Good exercise duration and workload.    6. During stress exam the patient developed no significant symptoms.    7. The aortic valve is trileaflet and sclerotic.  ECG Reviewed: Date:  "7/18/2018 Results:  AV dual-paced rhythm    Abnormal ECG    When compared with ECG of 19-AUG-2016 16:39,    Vent. rate has increased BY  10 BPM  Cath:  Date: Results:   (-) LARSON and orthopnea/PND   METS/Exercise Tolerance: 3 - Able to walk 1-2 blocks without stopping Comment: Limited due to recent knee surgery. Had been walking 1 mile per day. Now participates in PT twice weekly and walks short distances around his house. Is able to walk up a flight of stairs. Denies cardiac symptoms.     Hematologic:  - neg hematologic  ROS     Musculoskeletal: Comment: Gout  S/p L knee surgery 4/2023  Hx of right hip replacement      GI/Hepatic:     (+) GERD, Asymptomatic on medication,  (-) liver disease   Renal/Genitourinary: Comment: Urinary retention  Hx of UTI 2 weeks ago, resolved    (+) BPH,  (-) renal disease   Endo:  - neg endo ROS     Psychiatric/Substance Use:  - neg psychiatric ROS     Infectious Disease:  - neg infectious disease ROS  (-) Recent Fever   Malignancy:   (+) Malignancy, History of Skin.Skin CA Remission status post Surgery.        Other:  - neg other ROS          BP (!) 145/86 (BP Location: Right arm, Patient Position: Sitting, Cuff Size: Adult Large)   Pulse 84   Temp 97.5  F (36.4  C) (Oral)   Resp 16   Ht 1.778 m (5' 10\")   Wt 90.7 kg (200 lb)   SpO2 99%   BMI 28.70 kg/m      Physical Exam   Constitutional: Pleasant male, no apparent distress, and appears stated age.  Eyes: Pupils equal, round and reactive to light, extra ocular muscles intact, sclera clear, conjunctiva normal.  HENT: Normocephalic and atraumatic, oral pharynx with moist mucus membranes, good dentition. No goiter appreciated.   Respiratory: Clear to auscultation bilaterally, no crackles or wheezing.  Cardiovascular: Regular rate and rhythm, normal S1 and S2, and no murmur noted.  Carotids +2, no bruits. No edema. Palpable pulses to radial  DP and PT arteries.   GI: Normal bowel sounds, soft, non-distended, non-tender, no masses " palpated, no hepatosplenomegaly.   Lymph/Hematologic: No cervical lymphadenopathy and no supraclavicular lymphadenopathy.  Genitourinary:  Deferred  Skin: Warm and dry.  No rashes on exposed skin.   Musculoskeletal: Full ROM of neck. There is no redness, warmth, or swelling of visible joints. Gross motor strength is normal.    Neurologic: Awake, alert, oriented to name, place and time. Cranial nerves II-XII are grossly intact. Gait is normal.   Neuropsychiatric: Calm, cooperative. Normal affect.     Prior Labs/Diagnostic Studies   All labs and imaging personally reviewed     EKG/ stress test - if available please see in ROS above   No results found.    The patient's records and results personally reviewed by this provider.     Outside records reviewed from[facility] and scanned records from [place]    LAB/DIAGNOSTIC STUDIES TODAY:  CBC, BMP    Assessment  Mushtaq Olsen is a 83 year old male seen as a PAC referral for risk assessment and optimization for anesthesia.    Plan/Recommendations  Pt will be optimized for the proposed procedure.  See below for details on the assessment, risk, and preoperative recommendations    NEUROLOGY  - No history of TIA, CVA or seizure    -Post Op delirium risk factors:  Age    ENT  - No current airway concerns.  Will need to be reassessed day of surgery.  Mallampati: II  TM: > 3    CARDIAC  - Afib  [id number] score: 3. On Eliquis. Per discussion with PAC pharmacist, will plan for 2 day hold.  - Hypertension  Well controlled   - Patient has [employer] pacemaker for history of 3rd degree AV block. He reports that he has this interrogated every 6 months and that device settings have been stable. He does not know if he is dependent on this. Last interrogation was completed in October at OSH in [place] - we do not have these records. Arranged for patient to complete a device check today, will follow up on results.     - METS (Metabolic Equivalents)  Patient CANNOT perform 4 METS  "exercise due to recent knee surgery (4/2023). Prior to this he had been walking 1 mile daily. He is able to walk up a flight of stairs and participates in PT twice weekly. He denies cardiac symptoms.           Total Score: 1    Functional Capacity: Unable to complete 4 METS      RCRI-Very low risk: Class 1 0.4% complication rate            Total Score: 0        PULMONARY    JUAN JOSE Medium Risk            Total Score: 4    JUAN JOSE: Snores loudly    JUAN JOSE: Hypertension    JUAN JOSE: Over 50 ys old    JUAN JOSE: Male      - Denies asthma or inhaler use  - Tobacco History    History   Smoking Status     Former     Types: Cigarettes     Quit date: 1983   Smokeless Tobacco     Never       GI  - GERD  Controlled on medications: Proton Pump Inhibitor  PONV Low Risk  Total Score: 1           1 AN PONV: Patient is not a current smoker        /RENAL  - Baseline Creatinine  1.33 in 2020, no diagnosis of CKD. Will update BMP today.  - BPH/urinary retention: surgery as scheduled above    ENDOCRINE    - BMI: Estimated body mass index is 28.7 kg/m  as calculated from the following:    Height as of this encounter: 1.778 m (5' 10\").    Weight as of this encounter: 90.7 kg (200 lb).  Overweight (BMI 25.0-29.9)  - No history of Diabetes Mellitus    HEME  VTE Low Risk 0.5%            Total Score: 3    VTE: Greater than 59 yrs old    VTE: Male      - Coagulopathy second to Apixaban (Eliquis)      MSK  - S/p recent left knee surgery 4/2023. Recommendation for consideration of careful positioning to limit patient discomfort  - Hx of right hip replacement  - Gout on allopurinol    Different anesthesia methods/types have been discussed with the patient, but they are aware that the final plan will be decided by the assigned anesthesia provider on the date of service.  Patient was discussed with Dr Watt    The patient is optimized for their procedure. AVS with information on surgery time/arrival time, meds and NPO status given by nursing staff. No further " diagnostic testing indicated.      On the day of service:     Prep time: 10 minutes  Visit time: 20 minutes  Documentation time: 22 minutes  ------------------------------------------  Total time: 52 minutes      Yoli Smith PA-C  Preoperative Assessment Center  [facility]  [facility]  Phone: [phone]  Fax: [phone]    ADDENDUM: Patient completed pacemaker interrogation, lead trends appeared stable, no changes made to settings. See report for further details. Recommend follow up with primary device clinic in 6 months.     Yoli Smith PA-C on 5/11/2023 at 1:03 PM

## 2023-05-11 NOTE — H&P (VIEW-ONLY)
Pre-Operative H & P     CC:  Preoperative exam to assess for increased cardiopulmonary risk while undergoing surgery and anesthesia.    Date of Encounter: 5/11/2023  Primary Care Physician:  Saurabh Fuller     Reason for visit:   Encounter Diagnoses   Name Primary?     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     Patient has had urinary retention since undergoing knee surgery on 4/5/2023. He was treated with a marley catheter and has failed one voiding trail. He was seen in consultation by Dr. Carcamo on 5/9/23 where treatment options were discussed and a plan was made to proceed with surgery as scheduled above.     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)      BPH (benign prostatic hyperplasia)      Cardiac pacemaker      Cataract, right      Gout      Heart disease      Hyperlipidemia      Hypertension      Macular hole        Past Surgical History  Past Surgical History:   Procedure Laterality Date     CATARACT IOL, RT/LT Right      IMPLANT PACEMAKER      2012, 2020     KNEE SURGERY Left 04/05/2023     PROSTATE BIOPSY       TURP  2021     VITRECTOMY PARSPLANA WITH 25 GAUGE  SYSTEM Right 2020    Procedure: Right Eye 25 Gauge [place] Vitrectomy, Membrane Peel, Endolaser, Fluid/Gas Exchange, Infusion of 14% C3F8 Gas;  Surgeon: Beata Vasquez MD;  Location: [facility] OR     YAG CAPSULOTOMY OD (RIGHT EYE)  2016       Prior to Admission Medications  Current Outpatient Medications   Medication Sig Dispense Refill     acetaminophen (TYLENOL) 500 MG tablet Take 500 mg by mouth every 6 hours as needed for mild pain       allopurinol (ZYLOPRIM) 100 MG tablet Take 200 mg by mouth daily       apixaban ANTICOAGULANT (ELIQUIS) 5 MG tablet Take 5 mg by mouth 2 times daily       atorvastatin (LIPITOR) 20 MG tablet Take 20 mg by mouth At Bedtime       CHOLECALCIFEROL PO Take 1 tablet by mouth daily OTC uncertain of dose.       diclofenac (VOLTAREN) 75 MG EC tablet Take 75 mg by mouth 2 times daily as needed for moderate pain       dronedarone (MULTAQ) 400 MG TABS tablet Take 400 mg by mouth daily       dutasteride (AVODART) 0.5 MG capsule Take 0.5 mg by mouth daily       esomeprazole (NEXIUM) 20 MG DR capsule Take 40 mg by mouth every morning (before breakfast) Take 30-60 minutes before eating.       ibuprofen (ADVIL/MOTRIN) 200 MG tablet Take 400 mg by mouth every 6 hours as needed for pain       metoprolol succinate ER (TOPROL XL) 25 MG 24 hr tablet Take 1 tablet by mouth daily       tamsulosin (FLOMAX) 0.4 MG capsule Take 0.4 mg by mouth 2 times daily       traZODone (DESYREL) 50 MG tablet Take 50 mg by mouth At Bedtime         Allergies  No Known Allergies    Social History  Social History     Socioeconomic History     Marital status:      Spouse name: Not on file     Number of children: Not on file     Years of education: Not on file     Highest education level: Not on file   Occupational History     Not on file   Tobacco Use     Smoking status: Former     Types: Cigarettes     Quit date:      Years since quittin.3     Smokeless tobacco: Never   Vaping Use      Vaping status: Not on file   Substance and Sexual Activity     Alcohol use: Yes     Comment: Sometimes     Drug use: Not Currently     Sexual activity: Not on file   Other Topics Concern     Not on file   Social History Narrative     Not on file     Social Determinants of Health     Financial Resource Strain: Not on file   Food Insecurity: Not on file   Transportation Needs: Not on file   Physical Activity: Not on file   Stress: Not on file   Social Connections: Not on file   Intimate Partner Violence: Not on file   Housing Stability: Not on file       Family History  Family History   Problem Relation Age of Onset     Glaucoma No family hx of      Macular Degeneration No family hx of      Diabetes No family hx of      Anesthesia Reaction No family hx of      Venous thrombosis No family hx of        Review of Systems  The complete review of systems is negative other than noted in the HPI or here.   Anesthesia Evaluation   Pt has had prior anesthetic. Type: General.    No history of anesthetic complications       ROS/MED HX  ENT/Pulmonary:     (+) JUAN JOSE risk factors, snores loudly, hypertension, tobacco use, Past use,  (-) asthma   Neurologic:  - neg neurologic ROS     Cardiovascular:     (+) Dyslipidemia hypertension-----Taking blood thinners pacemaker, Reason placed: 3rd degree AV block, afib. type: Medtonic, dysrhythmias, a-fib and 3rd Deg Heart Block, Previous cardiac testing   Echo: Date: Results:    Stress Test: Date: 7/19/2018 Results:  Final Impressions:   1. Post stress, normal left ventricular size, increased global systolic function with an estimated EF of >75%.   2. Maximum stress test with 86.2% of age predicted maximum heart rate achieved.    3. Negative stress echo for ischemia.    4. The EKG was non-diagnostic.    5. Good exercise duration and workload.    6. During stress exam the patient developed no significant symptoms.    7. The aortic valve is trileaflet and sclerotic.  ECG Reviewed: Date:  "7/18/2018 Results:  AV dual-paced rhythm    Abnormal ECG    When compared with ECG of 19-AUG-2016 16:39,    Vent. rate has increased BY  10 BPM  Cath:  Date: Results:   (-) LARSON and orthopnea/PND   METS/Exercise Tolerance: 3 - Able to walk 1-2 blocks without stopping Comment: Limited due to recent knee surgery. Had been walking 1 mile per day. Now participates in PT twice weekly and walks short distances around his house. Is able to walk up a flight of stairs. Denies cardiac symptoms.     Hematologic:  - neg hematologic  ROS     Musculoskeletal: Comment: Gout  S/p L knee surgery 4/2023  Hx of right hip replacement      GI/Hepatic:     (+) GERD, Asymptomatic on medication,  (-) liver disease   Renal/Genitourinary: Comment: Urinary retention  Hx of UTI 2 weeks ago, resolved    (+) BPH,  (-) renal disease   Endo:  - neg endo ROS     Psychiatric/Substance Use:  - neg psychiatric ROS     Infectious Disease:  - neg infectious disease ROS  (-) Recent Fever   Malignancy:   (+) Malignancy, History of Skin.Skin CA Remission status post Surgery.        Other:  - neg other ROS          BP (!) 145/86 (BP Location: Right arm, Patient Position: Sitting, Cuff Size: Adult Large)   Pulse 84   Temp 97.5  F (36.4  C) (Oral)   Resp 16   Ht 1.778 m (5' 10\")   Wt 90.7 kg (200 lb)   SpO2 99%   BMI 28.70 kg/m      Physical Exam   Constitutional: Pleasant male, no apparent distress, and appears stated age.  Eyes: Pupils equal, round and reactive to light, extra ocular muscles intact, sclera clear, conjunctiva normal.  HENT: Normocephalic and atraumatic, oral pharynx with moist mucus membranes, good dentition. No goiter appreciated.   Respiratory: Clear to auscultation bilaterally, no crackles or wheezing.  Cardiovascular: Regular rate and rhythm, normal S1 and S2, and no murmur noted.  Carotids +2, no bruits. No edema. Palpable pulses to radial  DP and PT arteries.   GI: Normal bowel sounds, soft, non-distended, non-tender, no masses " palpated, no hepatosplenomegaly.   Lymph/Hematologic: No cervical lymphadenopathy and no supraclavicular lymphadenopathy.  Genitourinary:  Deferred  Skin: Warm and dry.  No rashes on exposed skin.   Musculoskeletal: Full ROM of neck. There is no redness, warmth, or swelling of visible joints. Gross motor strength is normal.    Neurologic: Awake, alert, oriented to name, place and time. Cranial nerves II-XII are grossly intact. Gait is normal.   Neuropsychiatric: Calm, cooperative. Normal affect.     Prior Labs/Diagnostic Studies   All labs and imaging personally reviewed     EKG/ stress test - if available please see in ROS above   No results found.    The patient's records and results personally reviewed by this provider.     Outside records reviewed from: Care Everywhere and scanned records from [place]    LAB/DIAGNOSTIC STUDIES TODAY:  CBC, BMP    Assessment  Mushtaq Olsen is a 83 year old male seen as a PAC referral for risk assessment and optimization for anesthesia.    Plan/Recommendations  Pt will be optimized for the proposed procedure.  See below for details on the assessment, risk, and preoperative recommendations    NEUROLOGY  - No history of TIA, CVA or seizure    -Post Op delirium risk factors:  Age    ENT  - No current airway concerns.  Will need to be reassessed day of surgery.  Mallampati: II  TM: > 3    CARDIAC  - Afib  [id number] score: 3. On Eliquis. Per discussion with PAC pharmacist, will plan for 2 day hold.  - Hypertension  Well controlled   - Patient has [employer] pacemaker for history of 3rd degree AV block. He reports that he has this interrogated every 6 months and that device settings have been stable. He does not know if he is dependent on this. Last interrogation was completed in October at OSH in [place] - we do not have these records. Arranged for patient to complete a device check today, will follow up on results.     - METS (Metabolic Equivalents)  Patient CANNOT perform 4 METS  "exercise due to recent knee surgery (4/2023). Prior to this he had been walking 1 mile daily. He is able to walk up a flight of stairs and participates in PT twice weekly. He denies cardiac symptoms.           Total Score: 1    Functional Capacity: Unable to complete 4 METS      RCRI-Very low risk: Class 1 0.4% complication rate            Total Score: 0        PULMONARY    JUAN JOSE Medium Risk            Total Score: 4    JUAN JOSE: Snores loudly    JUAN JOSE: Hypertension    JUAN JOSE: Over 50 ys old    JUAN JOSE: Male      - Denies asthma or inhaler use  - Tobacco History    History   Smoking Status     Former     Types: Cigarettes     Quit date: 1983   Smokeless Tobacco     Never       GI  - GERD  Controlled on medications: Proton Pump Inhibitor  PONV Low Risk  Total Score: 1           1 AN PONV: Patient is not a current smoker        /RENAL  - Baseline Creatinine  1.33 in 2020, no diagnosis of CKD. Will update BMP today.  - BPH/urinary retention: surgery as scheduled above    ENDOCRINE    - BMI: Estimated body mass index is 28.7 kg/m  as calculated from the following:    Height as of this encounter: 1.778 m (5' 10\").    Weight as of this encounter: 90.7 kg (200 lb).  Overweight (BMI 25.0-29.9)  - No history of Diabetes Mellitus    HEME  VTE Low Risk 0.5%            Total Score: 3    VTE: Greater than 59 yrs old    VTE: Male      - Coagulopathy second to Apixaban (Eliquis)      MSK  - S/p recent left knee surgery 4/2023. Recommendation for consideration of careful positioning to limit patient discomfort  - Hx of right hip replacement  - Gout on allopurinol    Different anesthesia methods/types have been discussed with the patient, but they are aware that the final plan will be decided by the assigned anesthesia provider on the date of service.  Patient was discussed with Dr Watt    The patient is optimized for their procedure. AVS with information on surgery time/arrival time, meds and NPO status given by nursing staff. No further " diagnostic testing indicated.      On the day of service:     Prep time: 10 minutes  Visit time: 20 minutes  Documentation time: 22 minutes  ------------------------------------------  Total time: 52 minutes      Yoli Smith PA-C  Preoperative Assessment Center  [facility]  Phone: [phone]  Fax: [phone]    ADDENDUM: Patient completed pacemaker interrogation, lead trends appeared stable, no changes made to settings. See report for further details. Recommend follow up with primary device clinic in 6 months.     Yoli Smith PA-C on 5/11/2023 at 1:03 PM

## 2023-05-11 NOTE — PATIENT INSTRUCTIONS
It was a pleasure to see you in clinic today. Please do not hesitate to call with any questions or concerns.    JUAN Chong, RN  Electrophysiology Nurse Clinician  [facility]  During business hours call:  [phone]  Urgent needs after hours- please call: [phone]- select option #4 and ask for job code [phone].

## 2023-05-12 ENCOUNTER — PATIENT OUTREACH (OUTPATIENT)
Dept: UROLOGY | Facility: CLINIC | Age: 83
End: 2023-05-12
Payer: MEDICARE

## 2023-05-12 DIAGNOSIS — N39.0 UTI (URINARY TRACT INFECTION): Primary | ICD-10-CM

## 2023-05-12 RX ORDER — CIPROFLOXACIN 500 MG/1
500 TABLET, FILM COATED ORAL 2 TIMES DAILY
Qty: 12 TABLET | Refills: 0 | Status: ON HOLD | OUTPATIENT
Start: 2023-05-12 | End: 2023-05-18

## 2023-05-12 NOTE — TELEPHONE ENCOUNTER
Per Dr. Carcamo, cipro 500 bid until surgery. Orders placed, and pt informed in detailed voicemail. Will reach again for confirmation and surgery review Monday     FERCHO Wright  Care Coordinator  [phone]

## 2023-05-15 ENCOUNTER — PATIENT OUTREACH (OUTPATIENT)
Dept: UROLOGY | Facility: CLINIC | Age: 83
End: 2023-05-15
Payer: MEDICARE

## 2023-05-15 NOTE — TELEPHONE ENCOUNTER
Pre Op Teaching Flowsheet       Pre and Post op Patient Education  Relevant Diagnosis: bph  Surgical procedure:  holep  Teaching Topic:  Pre and post op teaching  Person Involved in teaching: Yes    Motivation Level:  Asks Questions: Yes  Eager to Learn: Yes  Cooperative: Yes  Receptive (willing/able to accept information):  Yes    Patient demonstrates understanding of the following:  Date of surgery:  5/18   Location of surgery:  [facility]   History and Physical and any other testing necessary prior to surgery: Yes  Required time line for completion of History and Physical and any pre-op testing: Yes    Patient demonstrates understanding of the following:  Pre-op bowel prep:  N/A  Pre-op showering/scrub information with PCMX Soap: Yes  Blood thinner medications discussed and when to stop (if applicable):  Yes  Discussed no visitor's at this time due to increase Covid-19 cases and how we need to make sure everyone stays safe.    Infection Prevention:   Patient demonstrates understanding of the following:  Surgical procedure site care taught: Yes  Signs and symptoms of infection taught: Yes      Post-op follow-up:  Discussed how to contact the hospital, nurse, and clinic scheduling staff if necessary. (See packet information)    Instructional materials used/given/mailed:  [facility] Packet, post op teaching sheet, Map, Soap, and with the arrival/location information to come closer to the surgery date.    Surgical instructions packet given to patient in office:  N/A    Follow up: Discussed arranging for someone to drive you home. ( No public transportation)  Someone needed to stay the first twenty hours after surgery: Yes     referral: no     home:  yes    Care Giver:  yes    PCP:  yes

## 2023-05-17 ENCOUNTER — TELEPHONE (OUTPATIENT)
Dept: UROLOGY | Facility: CLINIC | Age: 83
End: 2023-05-17
Payer: MEDICARE

## 2023-05-17 NOTE — PROGRESS NOTES
PTA medications updated by Medication Scribe prior to surgery via phone call with patient (last doses completed by Nurse)     Medication history sources: Patient, Surescripts and H&P  In the past week, patient estimated taking medication this percent of the time: Greater than 90%      Significant changes made to the medication list:  None      Additional medication history information:   None    Medication reconciliation completed by provider prior to medication history? No    Time spent in this activity: 25 minutes    The information provided in this note is only as accurate as the sources available at the time of update(s)    Prior to Admission medications    Medication Sig Last Dose Taking? Auth Provider Long Term End Date   acetaminophen (TYLENOL) 500 MG tablet Take 500 mg by mouth every 6 hours as needed for mild pain Unknown at PRN Yes Reported, Patient     allopurinol (ZYLOPRIM) 100 MG tablet Take 2 tablets by mouth daily (2 x 100 mg = 200 mg)  at AM Yes Reported, Patient     apixaban ANTICOAGULANT (ELIQUIS) 5 MG tablet Take 1 tablet by mouth 2 times daily 5/15/2023 at PM Yes Reported, Patient     atorvastatin (LIPITOR) 20 MG tablet Take 1 tablet by mouth At Bedtime 5/17/2023 at PM Yes Reported, Patient Yes    CHOLECALCIFEROL PO Take 1 tablet by mouth daily OTC uncertain of dose. 5/14/2023 at AM Yes Unknown, Entered By History     ciprofloxacin (CIPRO) 500 MG tablet Take 1 tablet (500 mg) by mouth 2 times daily for 6 days 5/17/2023 at PM Yes Nilson Carcamo MD  5/18/23   diclofenac (VOLTAREN) 75 MG EC tablet Take 75 mg by mouth 2 times daily as needed for moderate pain Unknown at PRN Yes Unknown, Entered By History Yes    dronedarone (MULTAQ) 400 MG TABS tablet Take 1 tablet by mouth daily  at AM Yes Reported, Patient Yes    dutasteride (AVODART) 0.5 MG capsule Take 1 capsule by mouth daily  at AM Yes Reported, Patient     esomeprazole (NEXIUM) 20 MG DR capsule Take 2 capsules by mouth every morning  (before breakfast) Take 30-60 minutes before eating.  at AM Yes Reported, Patient     ibuprofen (ADVIL/MOTRIN) 200 MG tablet Take 400 mg by mouth every 6 hours as needed for pain Unknown at PRN Yes Unknown, Entered By History     metoprolol succinate ER (TOPROL XL) 25 MG 24 hr tablet Take 1 tablet by mouth daily  at AM Yes Reported, Patient Yes    tamsulosin (FLOMAX) 0.4 MG capsule Take 0.4 mg by mouth 2 times daily  at AM Yes Reported, Patient     traZODone (DESYREL) 50 MG tablet Take 50 mg by mouth At Bedtime 5/17/2023 at PM Yes Reported, Patient Yes      Medication history completed by:    Elder Howard CPhT  Medication [facility]

## 2023-05-17 NOTE — TELEPHONE ENCOUNTER
Spoke to patient and informed him that the location and time  of surgery had changed to [place] tomorrow May 18th  2023 with Dr Perez. Surgery packet and map sent to patient with updates via [facility]

## 2023-05-18 ENCOUNTER — ANESTHESIA (OUTPATIENT)
Dept: SURGERY | Facility: CLINIC | Age: 83
End: 2023-05-18
Payer: MEDICARE

## 2023-05-18 ENCOUNTER — HOSPITAL ENCOUNTER (OUTPATIENT)
Facility: CLINIC | Age: 83
Discharge: HOME | End: 2023-05-19
Attending: UROLOGY | Admitting: STUDENT IN AN ORGANIZED HEALTH CARE EDUCATION/TRAINING PROGRAM
Payer: MEDICARE

## 2023-05-18 DIAGNOSIS — N40.1 BENIGN PROSTATIC HYPERPLASIA WITH URINARY RETENTION: ICD-10-CM

## 2023-05-18 DIAGNOSIS — R33.8 BENIGN PROSTATIC HYPERPLASIA WITH URINARY RETENTION: ICD-10-CM

## 2023-05-18 DIAGNOSIS — N39.0 UTI (URINARY TRACT INFECTION): ICD-10-CM

## 2023-05-18 DIAGNOSIS — I48.0 PAROXYSMAL ATRIAL FIBRILLATION (H): Primary | ICD-10-CM

## 2023-05-18 PROBLEM — N40.0 BPH (BENIGN PROSTATIC HYPERPLASIA): Status: ACTIVE | Noted: 2023-05-18

## 2023-05-18 LAB
ABO/RH(D): NORMAL
ANION GAP SERPL CALCULATED.3IONS-SCNC: 10 MMOL/L (ref 7–15)
ANTIBODY SCREEN: NEGATIVE
BUN SERPL-MCNC: 25.9 MG/DL (ref 8–23)
CALCIUM SERPL-MCNC: 9.5 MG/DL (ref 8.8–10.2)
CHLORIDE SERPL-SCNC: 108 MMOL/L (ref 98–107)
CREAT SERPL-MCNC: 1.03 MG/DL (ref 0.67–1.17)
DEPRECATED HCO3 PLAS-SCNC: 22 MMOL/L (ref 22–29)
ERYTHROCYTE [DISTWIDTH] IN BLOOD BY AUTOMATED COUNT: 15.1 % (ref 10–15)
GFR SERPL CREATININE-BSD FRML MDRD: 73 ML/MIN/1.73M2
GLUCOSE BLDC GLUCOMTR-MCNC: 101 MG/DL (ref 70–99)
GLUCOSE SERPL-MCNC: 129 MG/DL (ref 70–99)
HCT VFR BLD AUTO: 39.1 % (ref 40–53)
HGB BLD-MCNC: 12.6 G/DL (ref 13.3–17.7)
HOLD SPECIMEN: NORMAL
HOLD SPECIMEN: NORMAL
MCH RBC QN AUTO: 30 PG (ref 26.5–33)
MCHC RBC AUTO-ENTMCNC: 32.2 G/DL (ref 31.5–36.5)
MCV RBC AUTO: 93 FL (ref 78–100)
PLATELET # BLD AUTO: 157 10E3/UL (ref 150–450)
POTASSIUM SERPL-SCNC: 4.4 MMOL/L (ref 3.4–5.3)
RBC # BLD AUTO: 4.2 10E6/UL (ref 4.4–5.9)
SODIUM SERPL-SCNC: 140 MMOL/L (ref 136–145)
SPECIMEN EXPIRATION DATE: NORMAL
WBC # BLD AUTO: 8 10E3/UL (ref 4–11)

## 2023-05-18 PROCEDURE — 88305 TISSUE EXAM BY PATHOLOGIST: CPT | Mod: 26 | Performed by: PATHOLOGY

## 2023-05-18 PROCEDURE — 36415 COLL VENOUS BLD VENIPUNCTURE: CPT | Performed by: UROLOGY

## 2023-05-18 PROCEDURE — 250N000024 HC ISOFLURANE, PER MIN: Performed by: UROLOGY

## 2023-05-18 PROCEDURE — 250N000011 HC RX IP 250 OP 636: Performed by: NURSE ANESTHETIST, CERTIFIED REGISTERED

## 2023-05-18 PROCEDURE — 86850 RBC ANTIBODY SCREEN: CPT | Performed by: UROLOGY

## 2023-05-18 PROCEDURE — 250N000009 HC RX 250: Performed by: NURSE ANESTHETIST, CERTIFIED REGISTERED

## 2023-05-18 PROCEDURE — 272N000001 HC OR GENERAL SUPPLY STERILE: Performed by: UROLOGY

## 2023-05-18 PROCEDURE — 258N000003 HC RX IP 258 OP 636: Performed by: NURSE ANESTHETIST, CERTIFIED REGISTERED

## 2023-05-18 PROCEDURE — 82310 ASSAY OF CALCIUM: CPT | Performed by: STUDENT IN AN ORGANIZED HEALTH CARE EDUCATION/TRAINING PROGRAM

## 2023-05-18 PROCEDURE — 52649 PROSTATE LASER ENUCLEATION: CPT | Performed by: UROLOGY

## 2023-05-18 PROCEDURE — 250N000011 HC RX IP 250 OP 636: Performed by: STUDENT IN AN ORGANIZED HEALTH CARE EDUCATION/TRAINING PROGRAM

## 2023-05-18 PROCEDURE — 258N000001 HC RX 258: Performed by: STUDENT IN AN ORGANIZED HEALTH CARE EDUCATION/TRAINING PROGRAM

## 2023-05-18 PROCEDURE — 250N000011 HC RX IP 250 OP 636: Performed by: UROLOGY

## 2023-05-18 PROCEDURE — 36415 COLL VENOUS BLD VENIPUNCTURE: CPT | Performed by: STUDENT IN AN ORGANIZED HEALTH CARE EDUCATION/TRAINING PROGRAM

## 2023-05-18 PROCEDURE — 82962 GLUCOSE BLOOD TEST: CPT

## 2023-05-18 PROCEDURE — 85027 COMPLETE CBC AUTOMATED: CPT | Performed by: STUDENT IN AN ORGANIZED HEALTH CARE EDUCATION/TRAINING PROGRAM

## 2023-05-18 PROCEDURE — 370N000017 HC ANESTHESIA TECHNICAL FEE, PER MIN: Performed by: UROLOGY

## 2023-05-18 PROCEDURE — 999N000141 HC STATISTIC PRE-PROCEDURE NURSING ASSESSMENT: Performed by: UROLOGY

## 2023-05-18 PROCEDURE — 258N000003 HC RX IP 258 OP 636: Performed by: STUDENT IN AN ORGANIZED HEALTH CARE EDUCATION/TRAINING PROGRAM

## 2023-05-18 PROCEDURE — C1758 CATHETER, URETERAL: HCPCS | Performed by: UROLOGY

## 2023-05-18 PROCEDURE — 250N000025 HC SEVOFLURANE, PER MIN: Performed by: UROLOGY

## 2023-05-18 PROCEDURE — 88305 TISSUE EXAM BY PATHOLOGIST: CPT | Mod: TC | Performed by: UROLOGY

## 2023-05-18 PROCEDURE — 360N000077 HC SURGERY LEVEL 4, PER MIN: Performed by: UROLOGY

## 2023-05-18 PROCEDURE — 710N000010 HC RECOVERY PHASE 1, LEVEL 2, PER MIN: Performed by: UROLOGY

## 2023-05-18 PROCEDURE — 250N000013 HC RX MED GY IP 250 OP 250 PS 637: Performed by: STUDENT IN AN ORGANIZED HEALTH CARE EDUCATION/TRAINING PROGRAM

## 2023-05-18 RX ORDER — PANTOPRAZOLE SODIUM 40 MG/1
40 TABLET, DELAYED RELEASE ORAL
Status: DISCONTINUED | OUTPATIENT
Start: 2023-05-19 | End: 2023-05-19 | Stop reason: HOSPADM

## 2023-05-18 RX ORDER — TRAZODONE HYDROCHLORIDE 50 MG/1
50 TABLET, FILM COATED ORAL AT BEDTIME
Status: DISCONTINUED | OUTPATIENT
Start: 2023-05-18 | End: 2023-05-19 | Stop reason: HOSPADM

## 2023-05-18 RX ORDER — PROPOFOL 10 MG/ML
INJECTION, EMULSION INTRAVENOUS PRN
Status: DISCONTINUED | OUTPATIENT
Start: 2023-05-18 | End: 2023-05-18

## 2023-05-18 RX ORDER — ONDANSETRON 4 MG/1
4 TABLET, ORALLY DISINTEGRATING ORAL EVERY 30 MIN PRN
Status: DISCONTINUED | OUTPATIENT
Start: 2023-05-18 | End: 2023-05-18 | Stop reason: HOSPADM

## 2023-05-18 RX ORDER — CIPROFLOXACIN 500 MG/1
500 TABLET, FILM COATED ORAL 2 TIMES DAILY
Qty: 10 TABLET | Refills: 0 | Status: SHIPPED | OUTPATIENT
Start: 2023-05-18 | End: 2023-05-23

## 2023-05-18 RX ORDER — LIDOCAINE 40 MG/G
CREAM TOPICAL
Status: DISCONTINUED | OUTPATIENT
Start: 2023-05-18 | End: 2023-05-19 | Stop reason: HOSPADM

## 2023-05-18 RX ORDER — METOPROLOL SUCCINATE 25 MG/1
25 TABLET, EXTENDED RELEASE ORAL DAILY
Status: DISCONTINUED | OUTPATIENT
Start: 2023-05-19 | End: 2023-05-19 | Stop reason: HOSPADM

## 2023-05-18 RX ORDER — LABETALOL HYDROCHLORIDE 5 MG/ML
10 INJECTION, SOLUTION INTRAVENOUS
Status: DISCONTINUED | OUTPATIENT
Start: 2023-05-18 | End: 2023-05-18 | Stop reason: HOSPADM

## 2023-05-18 RX ORDER — FENTANYL CITRATE 50 UG/ML
25 INJECTION, SOLUTION INTRAMUSCULAR; INTRAVENOUS EVERY 5 MIN PRN
Status: DISCONTINUED | OUTPATIENT
Start: 2023-05-18 | End: 2023-05-18 | Stop reason: HOSPADM

## 2023-05-18 RX ORDER — SODIUM CHLORIDE, SODIUM LACTATE, POTASSIUM CHLORIDE, CALCIUM CHLORIDE 600; 310; 30; 20 MG/100ML; MG/100ML; MG/100ML; MG/100ML
INJECTION, SOLUTION INTRAVENOUS CONTINUOUS PRN
Status: DISCONTINUED | OUTPATIENT
Start: 2023-05-18 | End: 2023-05-18

## 2023-05-18 RX ORDER — ATORVASTATIN CALCIUM 20 MG/1
20 TABLET, FILM COATED ORAL AT BEDTIME
Status: DISCONTINUED | OUTPATIENT
Start: 2023-05-18 | End: 2023-05-19 | Stop reason: HOSPADM

## 2023-05-18 RX ORDER — SODIUM CHLORIDE 9 MG/ML
INJECTION, SOLUTION INTRAVENOUS CONTINUOUS
Status: DISCONTINUED | OUTPATIENT
Start: 2023-05-18 | End: 2023-05-19

## 2023-05-18 RX ORDER — DICLOFENAC SODIUM 75 MG/1
75 TABLET, DELAYED RELEASE ORAL 2 TIMES DAILY PRN
Status: DISCONTINUED | OUTPATIENT
Start: 2023-05-18 | End: 2023-05-19 | Stop reason: HOSPADM

## 2023-05-18 RX ORDER — HYDROMORPHONE HYDROCHLORIDE 1 MG/ML
0.4 INJECTION, SOLUTION INTRAMUSCULAR; INTRAVENOUS; SUBCUTANEOUS EVERY 5 MIN PRN
Status: DISCONTINUED | OUTPATIENT
Start: 2023-05-18 | End: 2023-05-18 | Stop reason: HOSPADM

## 2023-05-18 RX ORDER — ALLOPURINOL 100 MG/1
200 TABLET ORAL DAILY
Status: DISCONTINUED | OUTPATIENT
Start: 2023-05-19 | End: 2023-05-19 | Stop reason: HOSPADM

## 2023-05-18 RX ORDER — SODIUM CHLORIDE, SODIUM LACTATE, POTASSIUM CHLORIDE, CALCIUM CHLORIDE 600; 310; 30; 20 MG/100ML; MG/100ML; MG/100ML; MG/100ML
INJECTION, SOLUTION INTRAVENOUS CONTINUOUS
Status: DISCONTINUED | OUTPATIENT
Start: 2023-05-18 | End: 2023-05-18 | Stop reason: HOSPADM

## 2023-05-18 RX ORDER — ONDANSETRON 2 MG/ML
INJECTION INTRAMUSCULAR; INTRAVENOUS PRN
Status: DISCONTINUED | OUTPATIENT
Start: 2023-05-18 | End: 2023-05-18

## 2023-05-18 RX ORDER — HYDROMORPHONE HYDROCHLORIDE 1 MG/ML
0.2 INJECTION, SOLUTION INTRAMUSCULAR; INTRAVENOUS; SUBCUTANEOUS EVERY 5 MIN PRN
Status: DISCONTINUED | OUTPATIENT
Start: 2023-05-18 | End: 2023-05-18 | Stop reason: HOSPADM

## 2023-05-18 RX ORDER — FENTANYL CITRATE 50 UG/ML
INJECTION, SOLUTION INTRAMUSCULAR; INTRAVENOUS PRN
Status: DISCONTINUED | OUTPATIENT
Start: 2023-05-18 | End: 2023-05-18

## 2023-05-18 RX ORDER — MEROPENEM 500 MG/1
500 INJECTION, POWDER, FOR SOLUTION INTRAVENOUS EVERY 6 HOURS
Status: DISCONTINUED | OUTPATIENT
Start: 2023-05-18 | End: 2023-05-19 | Stop reason: HOSPADM

## 2023-05-18 RX ORDER — ONDANSETRON 2 MG/ML
4 INJECTION INTRAMUSCULAR; INTRAVENOUS EVERY 30 MIN PRN
Status: DISCONTINUED | OUTPATIENT
Start: 2023-05-18 | End: 2023-05-18 | Stop reason: HOSPADM

## 2023-05-18 RX ORDER — DEXAMETHASONE SODIUM PHOSPHATE 4 MG/ML
INJECTION, SOLUTION INTRA-ARTICULAR; INTRALESIONAL; INTRAMUSCULAR; INTRAVENOUS; SOFT TISSUE PRN
Status: DISCONTINUED | OUTPATIENT
Start: 2023-05-18 | End: 2023-05-18

## 2023-05-18 RX ORDER — LIDOCAINE HYDROCHLORIDE 20 MG/ML
INJECTION, SOLUTION INFILTRATION; PERINEURAL PRN
Status: DISCONTINUED | OUTPATIENT
Start: 2023-05-18 | End: 2023-05-18

## 2023-05-18 RX ORDER — FUROSEMIDE 10 MG/ML
20 INJECTION INTRAMUSCULAR; INTRAVENOUS ONCE
Status: COMPLETED | OUTPATIENT
Start: 2023-05-19 | End: 2023-05-19

## 2023-05-18 RX ORDER — HYDROXYZINE HYDROCHLORIDE 10 MG/1
10 TABLET, FILM COATED ORAL EVERY 6 HOURS PRN
Status: DISCONTINUED | OUTPATIENT
Start: 2023-05-18 | End: 2023-05-18 | Stop reason: HOSPADM

## 2023-05-18 RX ORDER — MEPERIDINE HYDROCHLORIDE 25 MG/ML
12.5 INJECTION INTRAMUSCULAR; INTRAVENOUS; SUBCUTANEOUS EVERY 5 MIN PRN
Status: DISCONTINUED | OUTPATIENT
Start: 2023-05-18 | End: 2023-05-18 | Stop reason: HOSPADM

## 2023-05-18 RX ORDER — FENTANYL CITRATE 50 UG/ML
50 INJECTION, SOLUTION INTRAMUSCULAR; INTRAVENOUS EVERY 5 MIN PRN
Status: DISCONTINUED | OUTPATIENT
Start: 2023-05-18 | End: 2023-05-18 | Stop reason: HOSPADM

## 2023-05-18 RX ADMIN — PROPOFOL 50 MG: 10 INJECTION, EMULSION INTRAVENOUS at 10:13

## 2023-05-18 RX ADMIN — PROPOFOL 100 MG: 10 INJECTION, EMULSION INTRAVENOUS at 10:09

## 2023-05-18 RX ADMIN — SODIUM CHLORIDE 3000 ML: 900 IRRIGANT IRRIGATION at 15:38

## 2023-05-18 RX ADMIN — TRAZODONE HYDROCHLORIDE 50 MG: 50 TABLET ORAL at 21:56

## 2023-05-18 RX ADMIN — PHENYLEPHRINE HYDROCHLORIDE 100 MCG: 10 INJECTION INTRAVENOUS at 10:34

## 2023-05-18 RX ADMIN — PROPOFOL 30 MG: 10 INJECTION, EMULSION INTRAVENOUS at 10:37

## 2023-05-18 RX ADMIN — PHENYLEPHRINE HYDROCHLORIDE 100 MCG: 10 INJECTION INTRAVENOUS at 10:09

## 2023-05-18 RX ADMIN — LIDOCAINE HYDROCHLORIDE 100 MG: 20 INJECTION, SOLUTION INFILTRATION; PERINEURAL at 10:09

## 2023-05-18 RX ADMIN — PROPOFOL 50 MG: 10 INJECTION, EMULSION INTRAVENOUS at 10:16

## 2023-05-18 RX ADMIN — PROPOFOL 20 MG: 10 INJECTION, EMULSION INTRAVENOUS at 10:35

## 2023-05-18 RX ADMIN — MEROPENEM 500 MG: 500 INJECTION, POWDER, FOR SOLUTION INTRAVENOUS at 09:49

## 2023-05-18 RX ADMIN — PHENYLEPHRINE HYDROCHLORIDE 0.7 MCG/KG/MIN: 10 INJECTION INTRAVENOUS at 11:27

## 2023-05-18 RX ADMIN — PROPOFOL 50 MG: 10 INJECTION, EMULSION INTRAVENOUS at 10:10

## 2023-05-18 RX ADMIN — SODIUM CHLORIDE, POTASSIUM CHLORIDE, SODIUM LACTATE AND CALCIUM CHLORIDE: 600; 310; 30; 20 INJECTION, SOLUTION INTRAVENOUS at 10:06

## 2023-05-18 RX ADMIN — PHENYLEPHRINE HYDROCHLORIDE 100 MCG: 10 INJECTION INTRAVENOUS at 10:25

## 2023-05-18 RX ADMIN — SODIUM CHLORIDE: 9 INJECTION, SOLUTION INTRAVENOUS at 15:42

## 2023-05-18 RX ADMIN — PHENYLEPHRINE HYDROCHLORIDE 0.5 MCG/KG/MIN: 10 INJECTION INTRAVENOUS at 10:25

## 2023-05-18 RX ADMIN — ONDANSETRON 4 MG: 2 INJECTION INTRAMUSCULAR; INTRAVENOUS at 11:33

## 2023-05-18 RX ADMIN — MEROPENEM 500 MG: 500 INJECTION, POWDER, FOR SOLUTION INTRAVENOUS at 15:42

## 2023-05-18 RX ADMIN — FENTANYL CITRATE 100 MCG: 50 INJECTION, SOLUTION INTRAMUSCULAR; INTRAVENOUS at 10:09

## 2023-05-18 RX ADMIN — DEXAMETHASONE SODIUM PHOSPHATE 10 MG: 4 INJECTION, SOLUTION INTRA-ARTICULAR; INTRALESIONAL; INTRAMUSCULAR; INTRAVENOUS; SOFT TISSUE at 10:09

## 2023-05-18 RX ADMIN — FENTANYL CITRATE 50 MCG: 50 INJECTION, SOLUTION INTRAMUSCULAR; INTRAVENOUS at 10:39

## 2023-05-18 RX ADMIN — PHENYLEPHRINE HYDROCHLORIDE 100 MCG: 10 INJECTION INTRAVENOUS at 10:21

## 2023-05-18 RX ADMIN — ATORVASTATIN CALCIUM 20 MG: 20 TABLET, FILM COATED ORAL at 21:56

## 2023-05-18 RX ADMIN — PHENYLEPHRINE HYDROCHLORIDE 100 MCG: 10 INJECTION INTRAVENOUS at 10:19

## 2023-05-18 RX ADMIN — PHENYLEPHRINE HYDROCHLORIDE 100 MCG: 10 INJECTION INTRAVENOUS at 10:23

## 2023-05-18 RX ADMIN — MEROPENEM 500 MG: 500 INJECTION, POWDER, FOR SOLUTION INTRAVENOUS at 20:37

## 2023-05-18 RX ADMIN — FENTANYL CITRATE 25 MCG: 50 INJECTION, SOLUTION INTRAMUSCULAR; INTRAVENOUS at 10:57

## 2023-05-18 RX ADMIN — PROPOFOL 20 MG: 10 INJECTION, EMULSION INTRAVENOUS at 10:58

## 2023-05-18 RX ADMIN — SODIUM CHLORIDE 3000 ML: 900 IRRIGANT IRRIGATION at 17:33

## 2023-05-18 RX ADMIN — FENTANYL CITRATE 25 MCG: 50 INJECTION, SOLUTION INTRAMUSCULAR; INTRAVENOUS at 11:18

## 2023-05-18 ASSESSMENT — ACTIVITIES OF DAILY LIVING (ADL)
ADLS_ACUITY_SCORE: 22
NUMBER_OF_TIMES_PATIENT_HAS_FALLEN_WITHIN_LAST_SIX_MONTHS: 1
ADLS_ACUITY_SCORE: 20
ADLS_ACUITY_SCORE: 22
ADLS_ACUITY_SCORE: 35
DIFFICULTY_EATING/SWALLOWING: NO
DIFFICULTY_COMMUNICATING: NO
ADLS_ACUITY_SCORE: 22
WALKING_OR_CLIMBING_STAIRS_DIFFICULTY: NO
WEAR_GLASSES_OR_BLIND: YES
ADLS_ACUITY_SCORE: 22
DOING_ERRANDS_INDEPENDENTLY_DIFFICULTY: NO
VISION_MANAGEMENT: WEARS GLASSES FOR READING
CHANGE_IN_FUNCTIONAL_STATUS_SINCE_ONSET_OF_CURRENT_ILLNESS/INJURY: NO
ADLS_ACUITY_SCORE: 22
CONCENTRATING,_REMEMBERING_OR_MAKING_DECISIONS_DIFFICULTY: NO
HEARING_DIFFICULTY_OR_DEAF: NO
ADLS_ACUITY_SCORE: 20
DRESSING/BATHING_DIFFICULTY: NO

## 2023-05-18 ASSESSMENT — ENCOUNTER SYMPTOMS
ORTHOPNEA: 0
DYSRHYTHMIAS: 1

## 2023-05-18 ASSESSMENT — LIFESTYLE VARIABLES: TOBACCO_USE: 1

## 2023-05-18 NOTE — ANESTHESIA PROCEDURE NOTES
Airway       Patient location during procedure: OR  Staff -        CRNA: Rocio Damon APRN CRNA       Performed By: CRNA  Consent for Airway        Urgency: elective  Indications and Patient Condition       Indications for airway management: allison-procedural       Induction type:intravenous       Mask difficulty assessment: 2 - vent by mask + OA or adjuvant +/- NMBA    Final Airway Details       Final airway type: supraglottic airway    Supraglottic Airway Details        Type: LMA       Brand: Air-Q       LMA size: 3.5    Post intubation assessment        Placement verified by: capnometry, equal breath sounds and chest rise        Number of attempts at approach: 1       Secured with: silk tape       Ease of procedure: easy       Dentition: Intact and Unchanged

## 2023-05-18 NOTE — INTERVAL H&P NOTE
"I have reviewed the surgical (or preoperative) H&P that is linked to this encounter, and examined the patient. There are no significant changes    Clinical Conditions Present on Arrival:  Clinically Significant Risk Factors Present on Admission                # Drug Induced Coagulation Defect: home medication list includes an anticoagulant medication   # Overweight: Estimated body mass index is 28.7 kg/m  as calculated from the following:    Height as of 5/11/23: 1.778 m (5' 10\").    Weight as of 5/11/23: 90.7 kg (200 lb).       "

## 2023-05-18 NOTE — OR NURSING
PACU to Inpatient Nursing Handoff    Patient Mushtaq Olsen is a 83 year old male who speaks English.   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    Anesthesia Choice   Dermatome Level     Preop Meds Not applicable   Nerve block Not applicable   Intraop Meds dexamethasone (Decadron)  fentanyl (Sublimaze): 200 mcg total  ondansetron (Zofran): last given at 1133   Local Meds No   Antibiotics Not applicable     Pain Patient Currently in Pain: denies   PACU meds  Not applicable   PCA / epidural No   Capnography     Telemetry ECG Rhythm: Sinus rhythm;AV (Dual) paced rhythm   Inpatient Telemetry Monitor Ordered? No        Labs Glucose Lab Results   Component Value Date     05/18/2023       Hgb Lab Results   Component Value Date    HGB 12.6 05/18/2023       INR No results found for: INR   PACU Imaging Not applicable     Wound/Incision Incision/Surgical Site 07/21/20 Right Eye (Active)   Number of days: 1031       Incision/Surgical Site 05/18/23 Penis (Active)   Incision Assessment UTV 05/18/23 1227   Dressing Intervention Open to air / No Dressing 05/18/23 1227   Number of days: 0      CMS        Equipment continuous bladder irrigation   Other LDA       IV Access Peripheral IV 05/18/23 Right Hand (Active)   Site Assessment WDL 05/18/23 1200   Line Status Infusing 05/18/23 1200   Dressing Status clean;dry;intact 05/18/23 0914   Dressing Intervention New dressing  05/18/23 0914   Phlebitis Scale 0-->no symptoms 05/18/23 0914   Infiltration? no 05/18/23 0914   Number of days: 0      Blood  Products Not applicable EBL 10 mL   Intake/Output Date 05/18/23 0700 - 05/19/23 0659   Shift 6505-8170 1782-9657 8568-2184 24 Hour Total   INTAKE   I.V. 800   800   Shift Total(mL/kg) 800(8.9)   800(8.9)   OUTPUT   Urine 1200   1200   Shift Total(mL/kg) 1200(13.35)   1200(13.35)   Weight (kg) 89.9 89.9 89.9 89.9      Drains / Stubbs Urethral Catheter 05/18/23 Triple-lumen 22 fr (Active)   Tube Description UTV 05/18/23 1200   Collection Container Standard 05/18/23 1200   Securement Method Securing device (Describe) 05/18/23 1200   Number of days: 0      Time of void PreOp Time of Void Prior to Procedure: 0841 (05/18/23 0841)    PostOp      Diapered? No   Bladder Scan     PO    tolerating sips and water     Vitals    B/P: (!) 143/76  T: 98.6  F (37  C)    Temp src: Oral  P:  Pulse: 60 (05/18/23 1230)          R: 14  O2:  SpO2: 100 %    O2 Device: None (Room air) (05/18/23 1230)    Oxygen Delivery: 6 LPM (05/18/23 1157)         Family/support present wife in waiting room   Patient belongings     Patient transported on cart   DC meds/scripts (obs/outpt) Not applicable   Inpatient Pain Meds Released? Yes       Special needs/considerations CBI running clear on slow currently   Tasks needing completion None       Meena Metzger, RN  [facility] [id number]

## 2023-05-18 NOTE — DISCHARGE INSTRUCTIONS
HoLEP    Activity  - No strenuous exercise for 6 weeks.  - No lifting, pushing, pulling more than 10 pounds for 6 weeks.  - Do not strain with bowel movements.  - Do not drive until you can press the brake pedal quickly and fully without pain.  - Do not operate a motor vehicle while taking narcotic pain medications.    Urination  - Some light redness (up to a watermelon-like-pink in color) is expected in the upcoming 7-10 days.   - If having hematuria (blood in the urine), make sure to increase your water intake and monitor for improvement  - You may notice more blood in the urine with increasing physical activity. This is normal and is not a cause for concern unless the urine becomes dark maroon in color, contains clots larger than a quarter, or if you cannot urinate.   - If you are unable to urinate you should return urgently to the ED or call clinic to try to arrange for an urgent visit same day.     Medications  - You should complete a 5 day course of an antibiotic called ciprofloxacin.   - You should restart your Eliquis (apixaban) on 5/25/23, if your urine remains clear to light pink.     How will I manage my pain?  The best strategy for controlling your pain after surgery is around the clock pain  control with Tylenol (acetaminophen) and Motrin (ibuprofen or Advil). Alternating  these medications with each other allows you to maximize your pain control. In  addition to Tylenol and Motrin.    How will I alternate your regular strength over-the-counter pain medication?    You will take a dose of pain medication every three hours.  ? Start by taking 650 mg of Tylenol (2 pills of 325 mg)  ? 3 hours later take 600 mg of Motrin (3 pills of 200 mg)  ? 3 hours after taking the Motrin take 650 mg of Tylenol  ? 3 hours after that take 600 mg of Motrin.    See example - if your first dose of Tylenol is at 12:00 PM  12:00 PM Tylenol 650 mg (2 pills of 325 mg)  3:00 PM Motrin 600 mg (3 pills of 200 mg)  6:00 PM Tylenol  "650 mg (2 pills of 325 mg)  9:00 PM Motrin 600 mg (3 pills of 200 mg)    Continue alternating every 3 hours.  We recommend that you follow this schedule around-the-clock for at least 3 days  after surgery, or until you feel that it is no longer needed.     Do not take more than 4,000 mg of Tylenol or 3,200 mg of Motrin in  a 24-hour period.      Follow-Up:  - Follow up as scheduled in 1 month with Dr. Carcamo in clinic.   - Call your primary care provider to touch base regarding your recent admission.  - Call or return sooner than your regularly scheduled visit if you develop any of the following: fever (greater than 101.5), uncontrolled pain, uncontrolled nausea or vomiting, worsening blood in urine or inability to urinate as above.    Phone numbers:   - Monday through Friday 8am to 4:30pm: Call [phone] with questions, requests for medication refills, or to schedule or confirm an appointment.  - Nights or weekends: call the after hours emergency pager - [phone] and tell the  \"I would like to page the Urology Resident on call.\" Please note, due to prescribing laws, resident physicians are unable to prescribe narcotics after-hours. If you feel as though you will need narcotic pain medications, you will need to call the clinic during business hours OR seek emergency care.  - For emergencies, call [phone].  "

## 2023-05-18 NOTE — ANESTHESIA CARE TRANSFER NOTE
Patient: Mushtaq Olsen    Procedure: Procedure(s):  Holmium Laser Enucleation of the Prostate       Diagnosis: Urinary retention [R33.9]  Diagnosis Additional Information: No value filed.    Anesthesia Type:   General     Note:    Oropharynx: oropharynx clear of all foreign objects and spontaneously breathing  Level of Consciousness: drowsy  Oxygen Supplementation: face mask  Level of Supplemental Oxygen (L/min / FiO2): 10  Independent Airway: airway patency satisfactory and stable  Dentition: dentition unchanged  Vital Signs Stable: post-procedure vital signs reviewed and stable  Report to RN Given: handoff report given  Patient transferred to: PACU    Handoff Report: Identifed the Patient, Identified the Reponsible Provider, Reviewed the pertinent medical history, Discussed the surgical course, Reviewed Intra-OP anesthesia mangement and issues during anesthesia, Set expectations for post-procedure period and Allowed opportunity for questions and acknowledgement of understanding      Vitals:  Vitals Value Taken Time   /75 05/18/23 1157   Temp 36.6    Pulse 65 05/18/23 1159   Resp 9 05/18/23 1159   SpO2 100 % 05/18/23 1159   Vitals shown include unvalidated device data.    Electronically Signed By: SANDRA SHUKLA APRN CRNA  May 18, 2023  12:00 PM

## 2023-05-18 NOTE — ANESTHESIA PREPROCEDURE EVALUATION
Pre-Operative H & P     CC:  Preoperative exam to assess for increased cardiopulmonary risk while undergoing surgery and anesthesia.    Date of Encounter: 5/11/2023  Primary Care Physician:  Saurabh Fuller     Reason for visit:   No diagnosis found.    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     Patient has had urinary retention since undergoing knee surgery on 4/5/2023. He was treated with a marley catheter and has failed one voiding trail. He was seen in consultation by Dr. Carcamo on 5/9/23 where treatment options were discussed and a plan was made to proceed with surgery as scheduled above.     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)      BPH (benign prostatic hyperplasia)      Cardiac pacemaker      Cataract, right      Gout      Heart disease      Hyperlipidemia      Hypertension      Macular hole      Pacemaker        Past Surgical History  Past Surgical History:   Procedure Laterality Date     CATARACT IOL, RT/LT Right      IMPLANT PACEMAKER      2012, 2020     KNEE SURGERY Left 04/05/2023     PROSTATE BIOPSY       TURP  2021     VITRECTOMY PARSPLANA WITH 25 GAUGE SYSTEM Right 07/21/2020    Procedure: Right Eye 25 Gauge [place] Vitrectomy, Membrane Peel, Endolaser, Fluid/Gas  Exchange, Infusion of 14% C3F8 Gas;  Surgeon: Beata Vasquez MD;  Location: [facility] OR     YAG CAPSULOTOMY OD (RIGHT EYE)  2016       Prior to Admission Medications  No current outpatient medications on file.       Allergies  No Known Allergies    Social History  Social History     Socioeconomic History     Marital status:      Spouse name: Not on file     Number of children: Not on file     Years of education: Not on file     Highest education level: Not on file   Occupational History     Not on file   Tobacco Use     Smoking status: Former     Types: Cigarettes     Quit date:      Years since quittin.4     Smokeless tobacco: Never   Vaping Use     Vaping status: Not on file   Substance and Sexual Activity     Alcohol use: Yes     Comment: Sometimes     Drug use: Not Currently     Sexual activity: Not on file   Other Topics Concern     Not on file   Social History Narrative     Not on file     Social Determinants of Health     Financial Resource Strain: Not on file   Food Insecurity: Not on file   Transportation Needs: Not on file   Physical Activity: Not on file   Stress: Not on file   Social Connections: Not on file   Intimate Partner Violence: Not on file   Housing Stability: Not on file       Family History  Family History   Problem Relation Age of Onset     Glaucoma No family hx of      Macular Degeneration No family hx of      Diabetes No family hx of      Anesthesia Reaction No family hx of      Venous thrombosis No family hx of        Review of Systems  The complete review of systems is negative other than noted in the HPI or here.   Anesthesia Evaluation   Pt has had prior anesthetic. Type: General.    No history of anesthetic complications       ROS/MED HX  ENT/Pulmonary:     (+) JUAN JOSE risk factors, snores loudly, hypertension, tobacco use, Past use,  (-) asthma   Neurologic:  - neg neurologic ROS     Cardiovascular:     (+) Dyslipidemia hypertension-----Taking blood thinners  pacemaker, Reason placed: 3rd degree AV block, afib. type: Medtonic, dysrhythmias, a-fib and 3rd Deg Heart Block, Previous cardiac testing   Echo: Date: Results:    Stress Test: Date: 7/19/2018 Results:  Final Impressions:   1. Post stress, normal left ventricular size, increased global systolic function with an estimated EF of >75%.   2. Maximum stress test with 86.2% of age predicted maximum heart rate achieved.    3. Negative stress echo for ischemia.    4. The EKG was non-diagnostic.    5. Good exercise duration and workload.    6. During stress exam the patient developed no significant symptoms.    7. The aortic valve is trileaflet and sclerotic.  ECG Reviewed: Date: 7/18/2018 Results:  AV dual-paced rhythm    Abnormal ECG    When compared with ECG of 19-AUG-2016 16:39,    Vent. rate has increased BY  10 BPM  Cath:  Date: Results:   (-) LARSON and orthopnea/PND   METS/Exercise Tolerance: 3 - Able to walk 1-2 blocks without stopping Comment: Limited due to recent knee surgery. Had been walking 1 mile per day. Now participates in PT twice weekly and walks short distances around his house. Is able to walk up a flight of stairs. Denies cardiac symptoms.     Hematologic:  - neg hematologic  ROS     Musculoskeletal: Comment: Gout  S/p L knee surgery 4/2023  Hx of right hip replacement      GI/Hepatic:     (+) GERD, Asymptomatic on medication,  (-) liver disease   Renal/Genitourinary: Comment: Urinary retention  Hx of UTI 2 weeks ago, resolved    (+) BPH,  (-) renal disease   Endo:  - neg endo ROS     Psychiatric/Substance Use:  - neg psychiatric ROS     Infectious Disease:  - neg infectious disease ROS  (-) Recent Fever   Malignancy:   (+) Malignancy, History of Skin.Skin CA Remission status post Surgery.        Other:  - neg other ROS          There were no vitals taken for this visit.    Physical Exam   Constitutional: Pleasant male, no apparent distress, and appears stated age.  Eyes: Pupils equal, round and reactive  to light, extra ocular muscles intact, sclera clear, conjunctiva normal.  HENT: Normocephalic and atraumatic, oral pharynx with moist mucus membranes, good dentition. No goiter appreciated.   Respiratory: Clear to auscultation bilaterally, no crackles or wheezing.  Cardiovascular: Regular rate and rhythm, normal S1 and S2, and no murmur noted.  Carotids +2, no bruits. No edema. Palpable pulses to radial  DP and PT arteries.   GI: Normal bowel sounds, soft, non-distended, non-tender, no masses palpated, no hepatosplenomegaly.   Lymph/Hematologic: No cervical lymphadenopathy and no supraclavicular lymphadenopathy.  Genitourinary:  Deferred  Skin: Warm and dry.  No rashes on exposed skin.   Musculoskeletal: Full ROM of neck. There is no redness, warmth, or swelling of visible joints. Gross motor strength is normal.    Neurologic: Awake, alert, oriented to name, place and time. Cranial nerves II-XII are grossly intact. Gait is normal.   Neuropsychiatric: Calm, cooperative. Normal affect.     Prior Labs/Diagnostic Studies   All labs and imaging personally reviewed     EKG/ stress test - if available please see in ROS above   No results found.    The patient's records and results personally reviewed by this provider.     Outside records reviewed from: [facility] Everywhere and scanned records from [place]    LAB/DIAGNOSTIC STUDIES TODAY:  CBC, BMP    Assessment  Mushtaq Olsen is a 83 year old male seen as a PAC referral for risk assessment and optimization for anesthesia.    Plan/Recommendations  Pt will be optimized for the proposed procedure.  See below for details on the assessment, risk, and preoperative recommendations    NEUROLOGY  - No history of TIA, CVA or seizure    -Post Op delirium risk factors:  Age    ENT  - No current airway concerns.  Will need to be reassessed day of surgery.  Mallampati: II  TM: > 3    CARDIAC  - Afib  [id number] score: 3. On Eliquis. Per discussion with PAC pharmacist, will plan for 2 day  "hold.  - Hypertension  Well controlled   - Patient has [employer] pacemaker for history of 3rd degree AV block. He reports that he has this interrogated every 6 months and that device settings have been stable. He does not know if he is dependent on this. Last interrogation was completed in October at OSH in [place] - we do not have these records. Arranged for patient to complete a device check today, will follow up on results.     - METS (Metabolic Equivalents)  Patient CANNOT perform 4 METS exercise due to recent knee surgery (4/2023). Prior to this he had been walking 1 mile daily. He is able to walk up a flight of stairs and participates in PT twice weekly. He denies cardiac symptoms.           Total Score: 1    Functional Capacity: Unable to complete 4 METS      RCRI-Very low risk: Class 1 0.4% complication rate            Total Score: 0        PULMONARY    JUAN JOSE Medium Risk            Total Score: 4    JUAN JOSE: Snores loudly    JUAN JOSE: Hypertension    JUAN JOSE: Over 50 ys old    JUAN JOSE: Male      - Denies asthma or inhaler use  - Tobacco History    History   Smoking Status     Former     Types: Cigarettes     Quit date: 1983   Smokeless Tobacco     Never       GI  - GERD  Controlled on medications: Proton Pump Inhibitor  PONV Low Risk  Total Score: 1           1 AN PONV: Patient is not a current smoker        /RENAL  - Baseline Creatinine  1.33 in 2020, no diagnosis of CKD. Will update BMP today.  - BPH/urinary retention: surgery as scheduled above    ENDOCRINE    - BMI: Estimated body mass index is 28.44 kg/m  as calculated from the following:    Height as of an earlier encounter on 5/18/23: 1.778 m (5' 10\").    Weight as of an earlier encounter on 5/18/23: 89.9 kg (198 lb 3.1 oz).  Overweight (BMI 25.0-29.9)  - No history of Diabetes Mellitus    HEME  VTE Low Risk 0.5%            Total Score: 3    VTE: Greater than 59 yrs old    VTE: Male      - Coagulopathy second to Apixaban (Eliquis)      MSK  - S/p recent left knee " surgery 4/2023. Recommendation for consideration of careful positioning to limit patient discomfort  - Hx of right hip replacement  - Gout on allopurinol    Different anesthesia methods/types have been discussed with the patient, but they are aware that the final plan will be decided by the assigned anesthesia provider on the date of service.  Patient was discussed with Dr Watt    The patient is optimized for their procedure. AVS with information on surgery time/arrival time, meds and NPO status given by nursing staff. No further diagnostic testing indicated.      On the day of service:     Prep time: 10 minutes  Visit time: 20 minutes  Documentation time: 22 minutes  ------------------------------------------  Total time: 52 minutes      Yoli Smith PA-C  Preoperative Assessment Center  [facility]  Phone: [phone]  Fax: [phone]    ADDENDUM: Patient completed pacemaker interrogation, lead trends appeared stable, no changes made to settings. See report for further details. Recommend follow up with primary device clinic in 6 months.     Yoli Smith PA-C on 5/11/2023 at 1:03 PM    Physical Exam    Airway        Mallampati: III   TM distance: > 3 FB   Neck ROM: limited   Mouth opening: > 3 cm    Respiratory Devices and Support         Dental       (+) Minor Abnormalities - some fillings, tiny chips      Cardiovascular   cardiovascular exam normal       Rhythm and rate: regular     Pulmonary   pulmonary exam normal                  Anesthesia Plan    ASA Status:  3      Anesthesia Type: General.     - Airway: LMA   Induction: Intravenous.   Maintenance: Balanced.        Consents    Anesthesia Plan(s) and associated risks, benefits, and realistic alternatives discussed. Questions answered and patient/representative(s) expressed understanding.     - Discussed: Risks, Benefits and Alternatives for BOTH SEDATION and the PROCEDURE were discussed     - Discussed  with:  Patient      - Extended Intubation/Ventilatory Support Discussed: No.      - Patient is DNR/DNI Status: No    Use of blood products discussed: No .     Postoperative Care    Pain management: IV analgesics, Oral pain medications.   PONV prophylaxis: Ondansetron (or other 5HT-3), Dexamethasone or Solumedrol     Comments:

## 2023-05-18 NOTE — PROGRESS NOTES
6MS ADMISSION    D: Patient admitted/transferred from pacu via cart for .     I: Upon arrival to the unit patient was oriented to room, unit, and call light. Patient s height, weight, and vital signs were obtained. Allergies reviewed and allergy band applied. Provider notified of patient s arrival on the unit. Adult AVS completed. Head to toe assessment completed. Education assessment completed. Care plan initiated.    A: Vital signs stable upon admission. Patient rates pain at  0/10. Two RN skin assessment completed with Jose Carlos tompkinsant Skin found  Bed Algorithm can be found in PCS flow sheets Support Surface Algorithm) and on IP [facility] NURSE RESOURCE TAB.    P: Continue to monitor patient s VS, CBI, pain level  and intervene as needed. Continue with plan of care. Notify provider with any concerns or changes in patient status.

## 2023-05-18 NOTE — OP NOTE
Operative Report  5/18/2023    PREOPERATIVE DIAGNOSIS:  Prostatic hypertrophy with urinary retention  POSTOPERATIVE DIAGNOSIS: Same as above    PROCEDURE PERFORMED: Holmium laser enucleation of the prostate  ATTENDING SURGEON: Nilson Carcamo MD    FINDINGS: Approximately 60-70 gm prostate with bilateral lobe hypertrophy. Bladder with moderate trabeculation  ANESTHESIA: General  INTRAVENOUS FLUIDS: See anesthesia records  ESTIMATED BLOOD LOSS: Less than 100 ml   SPECIMENS: Prostate adenoma  DRAINS: 22-Czech 3-way catheter with 60 ml in balloon     INDICATIONS FOR PROCEDURE: Mushtaq Olsen is a(n) 83 year old male who was seen in consultation for urinary retention. He has elected treatment with laser enucleation. Prostate volume estimated to be 60-70 grams. His digital rectal exam was unremarkable.  After discussion of the risks, benefits and alternatives of the procedure, the patient agreed to proceed with the above stated procdure.    DESCRIPTION OF PROCEDURE: After obtaining informed consent, the patient was taken to the operating room and placed under general anesthesia.  He was repositioned in dorsal lithotomy making sure that the legs were positioned and padded safely.  He was then prepped and draped in standard sterile fashion.  Culture directed antibiotics were administered and bilateral sequential compression devices were placed.  A time out was performed confirming the appropriate patient identity and planned procedure.     The procedure was begun by generously lubricating the urethra.  The urethra was noted to be patent and did not require use of the sheng urethrotome in order to place the 26F outer sheath.  The outer sheath was placed over a deflecting obturator and we then looked the scope through the posterior urethra and into the bladder.  The prostate was noted to have a bilobar configuration.  There was a prior TUR defect and the righ lobe was assymetrically enlarged and intravesical. At this point  we inserted the 550 micron holmium laser through the 7F laser catheter.    We began enucleation by making an apical incision adjacent to the veromontanum on the right side. We made a paired incision at the bladder neck and proceeded to enuclete the large obstructing right lobe of tissue taking care to avoid mechanical injury to the sphincter.  We  the apex from the sphincter and pushed the lobe into the bladder.  Holmium laser setting was 2.0J and 50Hz on a Dom setting. We enucleated the left apical remnant tissue at the bladder neck.  There was minimal left sided hypertrophy. Total enucleation time as 25 min.    At this point there was a moderate amount of bleeding and approximately 10 minutes were spent identifying bleeding vessels in the fossa and coagulating them with the laser.  Once hemostasis was adequate we switched from the laser resectoscope to the 26F offset telescope with the RisparmioSuperanha morcellation device.  We added an extra inflow to distend the bladder.  The blades were adjusted and set at a rate of 1500 RPM.  We proceeded with morcellation making sure that we morcellated the entirety of the adenoma.  Total morcellation time was 9 minutes.     We then switched back to the laser resectoscope one final time to ensure that no residual tissue was left in the bladder.  We also confirmed that the bladder was unharmed from morcellation and that both ureteral orifices were unharmed during the procedure.  We inspected the fossa and obtained final hemostasis.   We looked the scope out noting that the sphincter was completely intact without evidence of thermal or mechanical injury.     We lubricated the urethra again and passed a 22F 3-way marley catheter over a catheter guide.  The urine was noted to be clear.  We filled the balloon with 60 ml of sterile water and did not place the catheter on traction.  The patient was woken from anesthesia and taken to the recovery room in stable condition.     The  specimen was weighed and found to be approximately 50 g.     POSTOPERATIVE PLAN:   -We will monitor the patient post operatively on continuous bladder irrigation for signs of ongling bleeding.  If clear we will consider discharge with marley removal as an outpatient.  If continues to have ongoing bleeding concerning for clot formation without bladder irrigation will plan to admit for observation    Nilson Carcamo

## 2023-05-19 VITALS
HEART RATE: 69 BPM | RESPIRATION RATE: 18 BRPM | WEIGHT: 206.35 LBS | SYSTOLIC BLOOD PRESSURE: 160 MMHG | OXYGEN SATURATION: 98 % | HEIGHT: 70 IN | DIASTOLIC BLOOD PRESSURE: 79 MMHG | TEMPERATURE: 97.7 F | BODY MASS INDEX: 29.54 KG/M2

## 2023-05-19 LAB
ANION GAP SERPL CALCULATED.3IONS-SCNC: 11 MMOL/L (ref 7–15)
BUN SERPL-MCNC: 24.6 MG/DL (ref 8–23)
CALCIUM SERPL-MCNC: 9.3 MG/DL (ref 8.8–10.2)
CHLORIDE SERPL-SCNC: 106 MMOL/L (ref 98–107)
CREAT SERPL-MCNC: 0.89 MG/DL (ref 0.67–1.17)
DEPRECATED HCO3 PLAS-SCNC: 21 MMOL/L (ref 22–29)
ERYTHROCYTE [DISTWIDTH] IN BLOOD BY AUTOMATED COUNT: 14.7 % (ref 10–15)
GFR SERPL CREATININE-BSD FRML MDRD: 86 ML/MIN/1.73M2
GLUCOSE SERPL-MCNC: 128 MG/DL (ref 70–99)
HCT VFR BLD AUTO: 38 % (ref 40–53)
HGB BLD-MCNC: 12.2 G/DL (ref 13.3–17.7)
MCH RBC QN AUTO: 30.1 PG (ref 26.5–33)
MCHC RBC AUTO-ENTMCNC: 32.1 G/DL (ref 31.5–36.5)
MCV RBC AUTO: 94 FL (ref 78–100)
PATH REPORT.COMMENTS IMP SPEC: NORMAL
PATH REPORT.COMMENTS IMP SPEC: NORMAL
PATH REPORT.FINAL DX SPEC: NORMAL
PATH REPORT.GROSS SPEC: NORMAL
PATH REPORT.MICROSCOPIC SPEC OTHER STN: NORMAL
PATH REPORT.RELEVANT HX SPEC: NORMAL
PHOTO IMAGE: NORMAL
PLATELET # BLD AUTO: 168 10E3/UL (ref 150–450)
POTASSIUM SERPL-SCNC: 4 MMOL/L (ref 3.4–5.3)
RBC # BLD AUTO: 4.05 10E6/UL (ref 4.4–5.9)
SODIUM SERPL-SCNC: 138 MMOL/L (ref 136–145)
WBC # BLD AUTO: 14.4 10E3/UL (ref 4–11)

## 2023-05-19 PROCEDURE — 250N000011 HC RX IP 250 OP 636: Performed by: STUDENT IN AN ORGANIZED HEALTH CARE EDUCATION/TRAINING PROGRAM

## 2023-05-19 PROCEDURE — 80048 BASIC METABOLIC PNL TOTAL CA: CPT | Performed by: STUDENT IN AN ORGANIZED HEALTH CARE EDUCATION/TRAINING PROGRAM

## 2023-05-19 PROCEDURE — 250N000013 HC RX MED GY IP 250 OP 250 PS 637: Performed by: STUDENT IN AN ORGANIZED HEALTH CARE EDUCATION/TRAINING PROGRAM

## 2023-05-19 PROCEDURE — 85027 COMPLETE CBC AUTOMATED: CPT | Performed by: STUDENT IN AN ORGANIZED HEALTH CARE EDUCATION/TRAINING PROGRAM

## 2023-05-19 PROCEDURE — 36415 COLL VENOUS BLD VENIPUNCTURE: CPT | Performed by: STUDENT IN AN ORGANIZED HEALTH CARE EDUCATION/TRAINING PROGRAM

## 2023-05-19 RX ADMIN — MEROPENEM 500 MG: 500 INJECTION, POWDER, FOR SOLUTION INTRAVENOUS at 02:52

## 2023-05-19 RX ADMIN — DRONEDARONE 400 MG: 400 TABLET, FILM COATED ORAL at 09:25

## 2023-05-19 RX ADMIN — FUROSEMIDE 20 MG: 10 INJECTION, SOLUTION INTRAMUSCULAR; INTRAVENOUS at 06:04

## 2023-05-19 RX ADMIN — ALLOPURINOL 200 MG: 100 TABLET ORAL at 08:11

## 2023-05-19 RX ADMIN — METOPROLOL SUCCINATE 25 MG: 25 TABLET, EXTENDED RELEASE ORAL at 08:11

## 2023-05-19 RX ADMIN — PANTOPRAZOLE SODIUM 40 MG: 40 TABLET, DELAYED RELEASE ORAL at 08:11

## 2023-05-19 RX ADMIN — MEROPENEM 500 MG: 500 INJECTION, POWDER, FOR SOLUTION INTRAVENOUS at 08:14

## 2023-05-19 ASSESSMENT — ACTIVITIES OF DAILY LIVING (ADL)
ADLS_ACUITY_SCORE: 22
ADLS_ACUITY_SCORE: 24

## 2023-05-19 NOTE — PLAN OF CARE
"Goal Outcome Evaluation:    VS: Blood pressure (!) 148/85, pulse 70, temperature 97.9  F (36.6  C), temperature source Oral, resp. rate 18, height 1.778 m (5' 10\"), weight 93.6 kg (206 lb 5.6 oz), SpO2 100 %.     O2: 100% on RA, denies SOB/Chest pain. Denies N/V   Output: Continent using urinal- light pink urine 75cc, no clots noted.   Last BM: 5/18 per pt, bowel sounds active x4   Activity: Independent but did not get out of bed   Up for meals? Yes   Skin: All visible skin intact.   Pain: Denies pain    CMS: AO x4, able to make needs known   Dressing: None   Diet: Regular diet, thin liquid.    LDA: R PIV    Equipment:  IV pole, personal belongings   Plan: Call light within reach, bed in a low position.  Continue to monitor with POC.   Additional Info:  CBI discontinued at 0645. On IV ABX. No concerns         Plan of Care Reviewed With: patient    Overall Patient Progress: improvingOverall Patient Progress: improving           "

## 2023-05-19 NOTE — PROGRESS NOTES
"Urology  Progress Note    NAEO  AF/VSS, mildly hypertensive overnight  Pain well controlled  Tolerating diet - no n/v  Not ambulated    Exam  BP (!) 160/86 (BP Location: Left arm)   Pulse 63   Temp 97.9  F (36.6  C) (Oral)   Resp 18   Ht 1.778 m (5' 10\")   Wt 93.6 kg (206 lb 5.6 oz)   SpO2 98%   BMI 29.61 kg/m    No acute distress  Unlabored breathing  Abdomen soft, nontender, nondistended.  Stubbs with light pink urine in tubing, CBI at medium rate    UOP CBI    Labs  Recent Labs   Lab Test 05/19/23  0526 05/18/23  1217 05/11/23  0933   WBC 14.4* 8.0 6.1   HGB 12.2* 12.6* 12.7*   CR 0.89 1.03 1.23*      AM labs pending    Assessment/Plan  83 year old y/o male POD#1 s/p HoLEP for BPH with urinary retention. Bladder irrigated, filled, Stubbs removed. Plan for discharge today after TOV and PVR x 2.     Neuro: Tylenol, oxycodone for pain control  CV: CTM  Pulm: incentive spirometry while awake  FEN/GI: regular diet, MIVF discontinued  Endo: PAOLA  : TOV  Heme/ID: AM labs pending. Plan for discharge on ciprofloxacin 5 days.  Activity: up ad myke  PPx: SCDs. Plan to restart Eliquis on 5/25/23.   Dispo: plan for discharge today after TOV and PVR x 2.    Seen and examined with the chief resident. Will discuss with Dr. Carcamo.    Robina Hernández MD, PGY-2  Urology Resident     Contacting the Urology Team     Please use the following job codes to reach the Urology Team. Note that you must use an in house phone and that job codes cannot receive text pages.     On weekdays, dial [phone] (or star-star-star [phone] on the new [employer] telephones) then [phone] to reach the Adult Urology resident or PA on call    On weekdays, dial [phone] (or star-star-star [phone] on the new [employer] telephones) then [phone] to reach the Pediatric Urology resident    On weeknights and weekends, dial [phone] (or star-star-star 777 on the new [employer] telephones) then [phone] to reach the Urology resident on call (for both Adult and Pediatrics)        "

## 2023-05-19 NOTE — PROGRESS NOTES
Pt CBI ended today, marley taken out. Pt A&O. Eager to go home. Voided x2 with 2 PVRs done.   Plan to discharge to home.     Adriana Morton RN on 5/19/2023 at 10:44 AM

## 2023-05-19 NOTE — PROGRESS NOTES
Pt discharge to home with wife.     Pt and wife verbalized understanding of discharge instructions and medications.   Pt has all belongings.     Adriana Morton RN on 5/19/2023 at 11:33 AM

## 2023-05-19 NOTE — PROGRESS NOTES
"   VS: BP (!) 154/89   Pulse 61   Temp 98  F (36.7  C) (Oral)   Resp 17   Ht 1.778 m (5' 10\")   Wt 93.6 kg (206 lb 5.6 oz)   SpO2 99%   BMI 29.61 kg/m       O2: Sating >95% on RA, denies SOB/Chest pain. Denies N/V   Output: Continues bladder irrigation. Urine is orange/watermelon color. Pt denies any discomfort. No clots noted.     Last BM: 5/18 per pt, bowel sounds normoactive x4   Activity: Did not get oob- recent knee surgery   Up for meals? Yes   Skin: All visible skin intact.   Pain: Denies pain    CMS: AO x4, Denies N/T   Dressing: None   Diet: Regular diet, thin liquid. LR @ 75 ml/hr   LDA: PIV to    Equipment:  IV pole, personal belongings   Plan: Call light within reach, bed in a low position. Able to make needs known. Continue to monitor with POC.   Additional Info:         "

## 2023-05-22 NOTE — ANESTHESIA POSTPROCEDURE EVALUATION
Patient: Mushtaq Olsen    Procedure: Procedure(s):  Holmium Laser Enucleation of the Prostate       Anesthesia Type:  General    Note:  Disposition: Inpatient   Postop Pain Control: Uneventful            Sign Out: Well controlled pain   PONV: No   Neuro/Psych: Uneventful            Sign Out: Acceptable/Baseline neuro status   Airway/Respiratory: Uneventful            Sign Out: Acceptable/Baseline resp. status   CV/Hemodynamics: Uneventful            Sign Out: Acceptable CV status; No obvious hypovolemia; No obvious fluid overload   Other NRE: NONE   DID A NON-ROUTINE EVENT OCCUR? No           Last vitals:  Vitals Value Taken Time   /76 05/18/23 1230   Temp 37  C (98.6  F) 05/18/23 1230   Pulse 60 05/18/23 1230   Resp 14 05/18/23 1230   SpO2 100 % 05/18/23 1230       Electronically Signed By: Blanca Dalal MD  May 21, 2023  8:07 PM  
Statement Selected

## 2023-05-25 ENCOUNTER — PRE VISIT (OUTPATIENT)
Dept: UROLOGY | Facility: CLINIC | Age: 83
End: 2023-05-25
Payer: MEDICARE

## 2023-05-25 NOTE — TELEPHONE ENCOUNTER
Reason for visit: Post-op s/p HoLEP 5/18/23    Dx/Hx/Sx: Urinary Retention    Records/imaging/labs/orders: In EPIC    At Rooming: paper GRETTA Yanez, EMT  05/25/23  8:42 AM

## 2023-06-04 ENCOUNTER — HEALTH MAINTENANCE LETTER (OUTPATIENT)
Age: 83
End: 2023-06-04

## 2023-06-27 ENCOUNTER — OFFICE VISIT (OUTPATIENT)
Dept: UROLOGY | Facility: CLINIC | Age: 83
End: 2023-06-27
Payer: MEDICARE

## 2023-06-27 VITALS
SYSTOLIC BLOOD PRESSURE: 130 MMHG | HEART RATE: 74 BPM | WEIGHT: 200 LBS | DIASTOLIC BLOOD PRESSURE: 80 MMHG | BODY MASS INDEX: 28.63 KG/M2 | HEIGHT: 70 IN

## 2023-06-27 DIAGNOSIS — R33.9 URINARY RETENTION: Primary | ICD-10-CM

## 2023-06-27 PROCEDURE — 99024 POSTOP FOLLOW-UP VISIT: CPT | Performed by: UROLOGY

## 2023-06-27 ASSESSMENT — PAIN SCALES - GENERAL: PAINLEVEL: NO PAIN (0)

## 2023-06-27 NOTE — PATIENT INSTRUCTIONS
- Please schedule a 1-year follow up appointment with Dr. Nilson Carcamo.    It was a pleasure meeting with you today.  Thank you for allowing me and my team the privilege of caring for you today.  YOU are the reason we are here, and I truly hope we provided you with the excellent service you deserve.  Please let us know if there is anything else we can do for you so that we can be sure you are leaving completely satisfied with your care experience.

## 2023-06-27 NOTE — LETTER
6/27/2023       RE: Mushtaq Olsen  [address]  [place] [address]     Dear Colleague,    Thank you for referring your patient, Mushtaq Olsen, to the [facility] UROLOGY CLINIC [place] at [facility]. Please see a copy of my visit note below.      HOLEP FOLLOW-UP NOTE    Today I had the pleasure of seeing Mr. Olsen in follow-up for a history of large prostate with urinary retention    He underwent holmium laser enucleation of the prostate approximately 6 weeks ago     31 gms of tissue were removed.  Pathology was benign    Today he notes he is quite pleased with the ability to empty his bladder without the need for catheter any longer.  He is having some slow stream type symptoms in the evening but not retaining.    AUA Symptom Score is 14/35 with a 2/6 for bother    Uroflow/Post Void Residual 1 cc PVR    Assessment/Plan -83year old male 6 weeks status post HoLEP  -Follow-up 1 year or sooner if new urinary symptoms    I, Nilson Carcamo saw and evaluated this patient and agree with the plan as stated above

## 2023-06-28 NOTE — PROGRESS NOTES
HOLEP FOLLOW-UP NOTE    Today I had the pleasure of seeing Mr. Olsen in follow-up for a history of large prostate with urinary retention    He underwent holmium laser enucleation of the prostate approximately 6 weeks ago     31 gms of tissue were removed.  Pathology was benign    Today he notes he is quite pleased with the ability to empty his bladder without the need for catheter any longer.  He is having some slow stream type symptoms in the evening but not retaining.    AUA Symptom Score is 14/35 with a 2/6 for bother    Uroflow/Post Void Residual 1 cc PVR    Assessment/Plan -83year old male 6 weeks status post HoLEP  -Follow-up 1 year or sooner if new urinary symptoms    I, Nilson Carcamo saw and evaluated this patient and agree with the plan as stated above

## 2023-12-28 ENCOUNTER — TELEPHONE (OUTPATIENT)
Dept: UROLOGY | Facility: CLINIC | Age: 83
End: 2023-12-28
Payer: MEDICARE

## 2024-06-05 ENCOUNTER — TRANSFERRED RECORDS (OUTPATIENT)
Dept: HEALTH INFORMATION MANAGEMENT | Facility: CLINIC | Age: 84
End: 2024-06-05

## 2024-06-06 ENCOUNTER — TRANSFERRED RECORDS (OUTPATIENT)
Dept: HEALTH INFORMATION MANAGEMENT | Facility: CLINIC | Age: 84
End: 2024-06-06

## 2024-06-24 ENCOUNTER — PRE VISIT (OUTPATIENT)
Dept: UROLOGY | Facility: CLINIC | Age: 84
End: 2024-06-24
Payer: MEDICARE

## 2024-06-24 NOTE — TELEPHONE ENCOUNTER
Reason for visit: annual visit     Relevant information: [facility] 05/2023    Records/imaging/labs/orders: None requested per previous notes    Pt called: No need for a call    At Rooming: AUA, PVR if pt reports bothersome urinary symptoms     Mimi Alex RN  6/24/2024  3:05 PM

## 2024-06-25 ENCOUNTER — OFFICE VISIT (OUTPATIENT)
Dept: UROLOGY | Facility: CLINIC | Age: 84
End: 2024-06-25
Payer: MEDICARE

## 2024-06-25 VITALS
SYSTOLIC BLOOD PRESSURE: 160 MMHG | HEART RATE: 68 BPM | BODY MASS INDEX: 28.63 KG/M2 | WEIGHT: 200 LBS | HEIGHT: 70 IN | DIASTOLIC BLOOD PRESSURE: 98 MMHG

## 2024-06-25 DIAGNOSIS — R39.198 SLOWING OF URINARY STREAM: Primary | ICD-10-CM

## 2024-06-25 DIAGNOSIS — R97.20 ELEVATED PSA: ICD-10-CM

## 2024-06-25 LAB
ALBUMIN UR-MCNC: NEGATIVE MG/DL
APPEARANCE UR: CLEAR
BILIRUB UR QL STRIP: NEGATIVE
COLOR UR AUTO: YELLOW
GLUCOSE UR STRIP-MCNC: NEGATIVE MG/DL
HGB UR QL STRIP: NEGATIVE
KETONES UR STRIP-MCNC: NEGATIVE MG/DL
LEUKOCYTE ESTERASE UR QL STRIP: NEGATIVE
NITRATE UR QL: NEGATIVE
PH UR STRIP: 5.5 [PH] (ref 5–7)
SP GR UR STRIP: 1.02 (ref 1–1.03)
UROBILINOGEN UR STRIP-MCNC: NORMAL MG/DL

## 2024-06-25 PROCEDURE — 81003 URINALYSIS AUTO W/O SCOPE: CPT | Performed by: PATHOLOGY

## 2024-06-25 PROCEDURE — 99214 OFFICE O/P EST MOD 30 MIN: CPT | Performed by: UROLOGY

## 2024-06-25 RX ORDER — TAMSULOSIN HYDROCHLORIDE 0.4 MG/1
0.4 CAPSULE ORAL DAILY
Qty: 30 CAPSULE | Refills: 3 | Status: SHIPPED | OUTPATIENT
Start: 2024-06-25

## 2024-06-25 ASSESSMENT — PAIN SCALES - GENERAL: PAINLEVEL: NO PAIN (0)

## 2024-06-25 NOTE — PROGRESS NOTES
UROLOGY OUTPATIENT VISIT      Chief Complaint:   Slow urinary stream      Synopsis    Mushtaq Olsen is a very pleasant AGE: 84 year old year old person    He underwent laser enucleation of the prostate 1 year ago for urinary retention.  Overall he has done quite well after surgery and was voiding with an excellent stream and no symptoms.  He returns now noting that for the past several months his urinary stream appears to be diminished.  He is not having any significant leakage and has not had any infections or hematuria.    Postvoid residual in the office today is 0 mL    A digital rectal exam is notable for a left-sided nodule    Pathology from surgery May 2023 revealed 31 g of benign tissue         Medications     Current Outpatient Medications   Medication Sig Dispense Refill    tamsulosin (FLOMAX) 0.4 MG capsule Take 1 capsule (0.4 mg) by mouth daily 30 capsule 3    acetaminophen (TYLENOL) 500 MG tablet Take 500 mg by mouth every 6 hours as needed for mild pain (Patient not taking: Reported on 6/27/2023)      allopurinol (ZYLOPRIM) 100 MG tablet Take 2 tablets by mouth daily (2 x 100 mg = 200 mg)      apixaban ANTICOAGULANT (ELIQUIS) 5 MG tablet Take 1 tablet (5 mg) by mouth 2 times daily      atorvastatin (LIPITOR) 20 MG tablet Take 1 tablet by mouth At Bedtime      CHOLECALCIFEROL PO Take 1 tablet by mouth daily OTC uncertain of dose.      diclofenac (VOLTAREN) 75 MG EC tablet Take 75 mg by mouth 2 times daily as needed for moderate pain      dronedarone (MULTAQ) 400 MG TABS tablet Take 1 tablet by mouth daily      esomeprazole (NEXIUM) 20 MG DR capsule Take 2 capsules by mouth every morning (before breakfast) Take 30-60 minutes before eating.      ibuprofen (ADVIL/MOTRIN) 200 MG tablet Take 400 mg by mouth every 6 hours as needed for pain      metoprolol succinate ER (TOPROL XL) 25 MG 24 hr tablet Take 1 tablet by mouth daily      traZODone (DESYREL) 50 MG tablet Take 50 mg by mouth At Bedtime        No current facility-administered medications for this visit.         The following  distinct labs were reviewed    I personally reviewed all applicable laboratory data and went over findings with patient  Significant for:    CBC RESULTS:  Recent Labs   Lab Test 05/19/23 0526 05/18/23 1217 05/11/23  0933   WBC 14.4* 8.0 6.1   HGB 12.2* 12.6* 12.7*    157 153        BMP RESULTS:  Recent Labs   Lab Test 05/19/23 0526 05/18/23 1217 05/18/23  0845 05/11/23  0933    140  --  141   POTASSIUM 4.0 4.4  --  4.5   CHLORIDE 106 108*  --  105   CO2 21* 22  --  26   ANIONGAP 11 10  --  10   * 129* 101* 94   BUN 24.6* 25.9*  --  30.5*   CR 0.89 1.03  --  1.23*   GFRESTIMATED 86 73  --  59*       CALCIUM RESULTS:  Recent Labs   Lab Test 05/19/23 0526 05/18/23 1217 05/11/23  0933   VA 9.3 9.5 9.6       UA RESULTS:   Recent Labs   Lab Test 06/25/24  1419   SG 1.023   URINEPH 5.5   NITRITE Negative                Assessment/Plan   84 year old year old person with history of urinary retention, holmium laser enucleation, now with slowing urinary stream  -Postvoid residual today is 0  -Etiologies could include urethral stricture, prostate cancer, hypotonic bladder  -Will start with updating a PSA as well as an MRI of the prostate given the abnormal exam.  I will contact him with the results.  If unrevealing we will proceed with cystoscopy  -Trial of Flomax as well to see if it helps with urinary symptoms      CC:  Saurabh Fuller

## 2024-06-25 NOTE — LETTER
6/25/2024       RE: Mushtaq Olsen  [address]  [place] [address]     Dear Colleague,    Thank you for referring your patient, Mushtaq Olsen, to the [facility] UROLOGY CLINIC [place] at [facility]. Please see a copy of my visit note below.          UROLOGY OUTPATIENT VISIT      Chief Complaint:   Slow urinary stream      Synopsis    Mushtaq Olsen is a very pleasant AGE: 84 year old year old person    He underwent laser enucleation of the prostate 1 year ago for urinary retention.  Overall he has done quite well after surgery and was voiding with an excellent stream and no symptoms.  He returns now noting that for the past several months his urinary stream appears to be diminished.  He is not having any significant leakage and has not had any infections or hematuria.    Postvoid residual in the office today is 0 mL    A digital rectal exam is notable for a left-sided nodule    Pathology from surgery May 2023 revealed 31 g of benign tissue         Medications     Current Outpatient Medications   Medication Sig Dispense Refill    tamsulosin (FLOMAX) 0.4 MG capsule Take 1 capsule (0.4 mg) by mouth daily 30 capsule 3    acetaminophen (TYLENOL) 500 MG tablet Take 500 mg by mouth every 6 hours as needed for mild pain (Patient not taking: Reported on 6/27/2023)      allopurinol (ZYLOPRIM) 100 MG tablet Take 2 tablets by mouth daily (2 x 100 mg = 200 mg)      apixaban ANTICOAGULANT (ELIQUIS) 5 MG tablet Take 1 tablet (5 mg) by mouth 2 times daily      atorvastatin (LIPITOR) 20 MG tablet Take 1 tablet by mouth At Bedtime      CHOLECALCIFEROL PO Take 1 tablet by mouth daily OTC uncertain of dose.      diclofenac (VOLTAREN) 75 MG EC tablet Take 75 mg by mouth 2 times daily as needed for moderate pain      dronedarone (MULTAQ) 400 MG TABS tablet Take 1 tablet by mouth daily      esomeprazole (NEXIUM) 20 MG DR capsule Take 2 capsules by mouth every morning  (before breakfast) Take 30-60 minutes before eating.      ibuprofen (ADVIL/MOTRIN) 200 MG tablet Take 400 mg by mouth every 6 hours as needed for pain      metoprolol succinate ER (TOPROL XL) 25 MG 24 hr tablet Take 1 tablet by mouth daily      traZODone (DESYREL) 50 MG tablet Take 50 mg by mouth At Bedtime       No current facility-administered medications for this visit.         The following  distinct labs were reviewed    I personally reviewed all applicable laboratory data and went over findings with patient  Significant for:    CBC RESULTS:  Recent Labs   Lab Test 05/19/23 0526 05/18/23 1217 05/11/23  0933   WBC 14.4* 8.0 6.1   HGB 12.2* 12.6* 12.7*    157 153        BMP RESULTS:  Recent Labs   Lab Test 05/19/23 0526 05/18/23 1217 05/18/23  0845 05/11/23  0933    140  --  141   POTASSIUM 4.0 4.4  --  4.5   CHLORIDE 106 108*  --  105   CO2 21* 22  --  26   ANIONGAP 11 10  --  10   * 129* 101* 94   BUN 24.6* 25.9*  --  30.5*   CR 0.89 1.03  --  1.23*   GFRESTIMATED 86 73  --  59*       CALCIUM RESULTS:  Recent Labs   Lab Test 05/19/23 0526 05/18/23 1217 05/11/23  0933   VA 9.3 9.5 9.6       UA RESULTS:   Recent Labs   Lab Test 06/25/24  1419   SG 1.023   URINEPH 5.5   NITRITE Negative            Assessment/Plan   84 year old year old person with history of urinary retention, holmium laser enucleation, now with slowing urinary stream  -Postvoid residual today is 0  -Etiologies could include urethral stricture, prostate cancer, hypotonic bladder  -Will start with updating a PSA as well as an MRI of the prostate given the abnormal exam.  I will contact him with the results.  If unrevealing we will proceed with cystoscopy  -Trial of Flomax as well to see if it helps with urinary symptoms      CC:  Saurabh Fuller    Sincerely,  Nilson Carcamo MD

## 2024-07-14 ENCOUNTER — HEALTH MAINTENANCE LETTER (OUTPATIENT)
Age: 84
End: 2024-07-14

## 2024-07-29 ENCOUNTER — HOSPITAL ENCOUNTER (OUTPATIENT)
Dept: GENERAL RADIOLOGY | Facility: CLINIC | Age: 84
Discharge: HOME | End: 2024-07-29
Attending: UROLOGY
Payer: MEDICARE

## 2024-07-29 ENCOUNTER — ANCILLARY PROCEDURE (OUTPATIENT)
Dept: CARDIOLOGY | Facility: CLINIC | Age: 84
End: 2024-07-29
Attending: INTERNAL MEDICINE
Payer: MEDICARE

## 2024-07-29 ENCOUNTER — HOSPITAL ENCOUNTER (OUTPATIENT)
Dept: MRI IMAGING | Facility: CLINIC | Age: 84
Discharge: HOME | End: 2024-07-29
Attending: UROLOGY
Payer: MEDICARE

## 2024-07-29 DIAGNOSIS — Z45.02 ENCOUNTER FOR ADJUSTMENT AND MANAGEMENT OF AUTOMATIC IMPLANTABLE CARDIAC DEFIBRILLATOR: ICD-10-CM

## 2024-07-29 DIAGNOSIS — I44.2 AV BLOCK, 3RD DEGREE (H): Primary | ICD-10-CM

## 2024-07-29 DIAGNOSIS — I44.2 AV BLOCK, 3RD DEGREE (H): ICD-10-CM

## 2024-07-29 DIAGNOSIS — R39.198 SLOWING OF URINARY STREAM: ICD-10-CM

## 2024-07-29 LAB
MDC_IDC_EPISODE_DTM: NORMAL
MDC_IDC_EPISODE_DURATION: 100 S
MDC_IDC_EPISODE_DURATION: 107 S
MDC_IDC_EPISODE_DURATION: 111 S
MDC_IDC_EPISODE_DURATION: 119 S
MDC_IDC_EPISODE_DURATION: 12 S
MDC_IDC_EPISODE_DURATION: 148 S
MDC_IDC_EPISODE_DURATION: 156 S
MDC_IDC_EPISODE_DURATION: 159 S
MDC_IDC_EPISODE_DURATION: 17 S
MDC_IDC_EPISODE_DURATION: 178 S
MDC_IDC_EPISODE_DURATION: 180 S
MDC_IDC_EPISODE_DURATION: 1859 S
MDC_IDC_EPISODE_DURATION: 189 S
MDC_IDC_EPISODE_DURATION: 2 S
MDC_IDC_EPISODE_DURATION: 228 S
MDC_IDC_EPISODE_DURATION: 2449 S
MDC_IDC_EPISODE_DURATION: 28 S
MDC_IDC_EPISODE_DURATION: 3 S
MDC_IDC_EPISODE_DURATION: 3 S
MDC_IDC_EPISODE_DURATION: 306 S
MDC_IDC_EPISODE_DURATION: 31 S
MDC_IDC_EPISODE_DURATION: 38 S
MDC_IDC_EPISODE_DURATION: 41 S
MDC_IDC_EPISODE_DURATION: 423 S
MDC_IDC_EPISODE_DURATION: 454 S
MDC_IDC_EPISODE_DURATION: 54 S
MDC_IDC_EPISODE_DURATION: 6 S
MDC_IDC_EPISODE_DURATION: 608 S
MDC_IDC_EPISODE_DURATION: 6347 S
MDC_IDC_EPISODE_DURATION: 670 S
MDC_IDC_EPISODE_DURATION: 7 S
MDC_IDC_EPISODE_DURATION: 708 S
MDC_IDC_EPISODE_DURATION: 74 S
MDC_IDC_EPISODE_DURATION: 87 S
MDC_IDC_EPISODE_DURATION: 94 S
MDC_IDC_EPISODE_DURATION: NORMAL S
MDC_IDC_EPISODE_ID: 5131
MDC_IDC_EPISODE_ID: 5567
MDC_IDC_EPISODE_ID: 5825
MDC_IDC_EPISODE_ID: 5826
MDC_IDC_EPISODE_ID: 5892
MDC_IDC_EPISODE_ID: 5946
MDC_IDC_EPISODE_ID: 5947
MDC_IDC_EPISODE_ID: 5948
MDC_IDC_EPISODE_ID: 5949
MDC_IDC_EPISODE_ID: 5950
MDC_IDC_EPISODE_ID: 5951
MDC_IDC_EPISODE_ID: 5952
MDC_IDC_EPISODE_ID: 5953
MDC_IDC_EPISODE_ID: 5954
MDC_IDC_EPISODE_ID: 5955
MDC_IDC_EPISODE_ID: 5956
MDC_IDC_EPISODE_ID: 5957
MDC_IDC_EPISODE_ID: 5958
MDC_IDC_EPISODE_ID: 5959
MDC_IDC_EPISODE_ID: 5960
MDC_IDC_EPISODE_ID: 5961
MDC_IDC_EPISODE_ID: 5962
MDC_IDC_EPISODE_ID: 5963
MDC_IDC_EPISODE_ID: 5964
MDC_IDC_EPISODE_ID: 5965
MDC_IDC_EPISODE_ID: 5966
MDC_IDC_EPISODE_ID: 5967
MDC_IDC_EPISODE_ID: 5968
MDC_IDC_EPISODE_ID: 5969
MDC_IDC_EPISODE_ID: 5970
MDC_IDC_EPISODE_ID: 5971
MDC_IDC_EPISODE_ID: 5972
MDC_IDC_EPISODE_ID: 5973
MDC_IDC_EPISODE_ID: 5974
MDC_IDC_EPISODE_ID: 5975
MDC_IDC_EPISODE_ID: 5976
MDC_IDC_EPISODE_ID: 5977
MDC_IDC_EPISODE_ID: 5978
MDC_IDC_EPISODE_ID: 5979
MDC_IDC_EPISODE_ID: 5980
MDC_IDC_EPISODE_ID: 5981
MDC_IDC_EPISODE_ID: 5982
MDC_IDC_EPISODE_ID: 5983
MDC_IDC_EPISODE_ID: 5984
MDC_IDC_EPISODE_ID: 5985
MDC_IDC_EPISODE_ID: 5986
MDC_IDC_EPISODE_ID: 5987
MDC_IDC_EPISODE_ID: 5988
MDC_IDC_EPISODE_ID: 5989
MDC_IDC_EPISODE_ID: 5990
MDC_IDC_EPISODE_ID: 5991
MDC_IDC_EPISODE_ID: 5992
MDC_IDC_EPISODE_ID: 5993
MDC_IDC_EPISODE_ID: 5994
MDC_IDC_EPISODE_ID: 5995
MDC_IDC_EPISODE_ID: 5996
MDC_IDC_EPISODE_ID: 5997
MDC_IDC_EPISODE_ID: 5998
MDC_IDC_EPISODE_ID: 5999
MDC_IDC_EPISODE_ID: 6000
MDC_IDC_EPISODE_ID: 6001
MDC_IDC_EPISODE_ID: 6002
MDC_IDC_EPISODE_ID: 6003
MDC_IDC_EPISODE_ID: 6004
MDC_IDC_EPISODE_ID: 6005
MDC_IDC_EPISODE_ID: 6006
MDC_IDC_EPISODE_TYPE: NORMAL
MDC_IDC_EPISODE_TYPE_INDUCED: NO
MDC_IDC_LEAD_CONNECTION_STATUS: NORMAL
MDC_IDC_LEAD_IMPLANT_DT: NORMAL
MDC_IDC_LEAD_LOCATION: NORMAL
MDC_IDC_LEAD_LOCATION_DETAIL_1: NORMAL
MDC_IDC_LEAD_MFG: NORMAL
MDC_IDC_LEAD_MODEL: NORMAL
MDC_IDC_LEAD_POLARITY_TYPE: NORMAL
MDC_IDC_LEAD_SERIAL: NORMAL
MDC_IDC_LEAD_SPECIAL_FUNCTION: NORMAL
MDC_IDC_MSMT_BATTERY_DTM: NORMAL
MDC_IDC_MSMT_BATTERY_DTM: NORMAL
MDC_IDC_MSMT_BATTERY_REMAINING_LONGEVITY: 18 MO
MDC_IDC_MSMT_BATTERY_REMAINING_LONGEVITY: 18 MO
MDC_IDC_MSMT_BATTERY_RRT_TRIGGER: 2.83
MDC_IDC_MSMT_BATTERY_RRT_TRIGGER: 2.83
MDC_IDC_MSMT_BATTERY_STATUS: NORMAL
MDC_IDC_MSMT_BATTERY_STATUS: NORMAL
MDC_IDC_MSMT_BATTERY_VOLTAGE: 2.92 V
MDC_IDC_MSMT_BATTERY_VOLTAGE: 2.92 V
MDC_IDC_MSMT_LEADCHNL_RA_IMPEDANCE_VALUE: 342 OHM
MDC_IDC_MSMT_LEADCHNL_RA_IMPEDANCE_VALUE: 361 OHM
MDC_IDC_MSMT_LEADCHNL_RA_IMPEDANCE_VALUE: 456 OHM
MDC_IDC_MSMT_LEADCHNL_RA_IMPEDANCE_VALUE: 494 OHM
MDC_IDC_MSMT_LEADCHNL_RA_SENSING_INTR_AMPL: 6.5 MV
MDC_IDC_MSMT_LEADCHNL_RA_SENSING_INTR_AMPL: 6.88 MV
MDC_IDC_MSMT_LEADCHNL_RV_IMPEDANCE_VALUE: 475 OHM
MDC_IDC_MSMT_LEADCHNL_RV_IMPEDANCE_VALUE: 494 OHM
MDC_IDC_MSMT_LEADCHNL_RV_IMPEDANCE_VALUE: 513 OHM
MDC_IDC_MSMT_LEADCHNL_RV_IMPEDANCE_VALUE: 551 OHM
MDC_IDC_MSMT_LEADCHNL_RV_PACING_THRESHOLD_AMPLITUDE: 0.75 V
MDC_IDC_MSMT_LEADCHNL_RV_PACING_THRESHOLD_AMPLITUDE: 0.75 V
MDC_IDC_MSMT_LEADCHNL_RV_PACING_THRESHOLD_PULSEWIDTH: 0.4 MS
MDC_IDC_MSMT_LEADCHNL_RV_PACING_THRESHOLD_PULSEWIDTH: 0.4 MS
MDC_IDC_MSMT_LEADCHNL_RV_SENSING_INTR_AMPL: 15.4 MV
MDC_IDC_MSMT_LEADCHNL_RV_SENSING_INTR_AMPL: 17.4 MV
MDC_IDC_PG_IMPLANT_DTM: NORMAL
MDC_IDC_PG_IMPLANT_DTM: NORMAL
MDC_IDC_PG_MFG: NORMAL
MDC_IDC_PG_MFG: NORMAL
MDC_IDC_PG_MODEL: NORMAL
MDC_IDC_PG_MODEL: NORMAL
MDC_IDC_PG_SERIAL: NORMAL
MDC_IDC_PG_SERIAL: NORMAL
MDC_IDC_PG_TYPE: NORMAL
MDC_IDC_PG_TYPE: NORMAL
MDC_IDC_SESS_CLINIC_NAME: NORMAL
MDC_IDC_SESS_CLINIC_NAME: NORMAL
MDC_IDC_SESS_DTM: NORMAL
MDC_IDC_SESS_DTM: NORMAL
MDC_IDC_SESS_TYPE: NORMAL
MDC_IDC_SESS_TYPE: NORMAL
MDC_IDC_SET_BRADY_AT_MODE_SWITCH_RATE: 171 {BEATS}/MIN
MDC_IDC_SET_BRADY_AT_MODE_SWITCH_RATE: 171 {BEATS}/MIN
MDC_IDC_SET_BRADY_HYSTRATE: NORMAL
MDC_IDC_SET_BRADY_HYSTRATE: NORMAL
MDC_IDC_SET_BRADY_LOWRATE: 60 {BEATS}/MIN
MDC_IDC_SET_BRADY_LOWRATE: 60 {BEATS}/MIN
MDC_IDC_SET_BRADY_MAX_SENSOR_RATE: 120 {BEATS}/MIN
MDC_IDC_SET_BRADY_MAX_SENSOR_RATE: 120 {BEATS}/MIN
MDC_IDC_SET_BRADY_MAX_TRACKING_RATE: 120 {BEATS}/MIN
MDC_IDC_SET_BRADY_MAX_TRACKING_RATE: 120 {BEATS}/MIN
MDC_IDC_SET_BRADY_MODE: NORMAL
MDC_IDC_SET_BRADY_MODE: NORMAL
MDC_IDC_SET_BRADY_PAV_DELAY_LOW: 180 MS
MDC_IDC_SET_BRADY_PAV_DELAY_LOW: 180 MS
MDC_IDC_SET_BRADY_SAV_DELAY_LOW: 150 MS
MDC_IDC_SET_BRADY_SAV_DELAY_LOW: 150 MS
MDC_IDC_SET_LEADCHNL_RA_PACING_AMPLITUDE: 1.75 V
MDC_IDC_SET_LEADCHNL_RA_PACING_AMPLITUDE: 1.75 V
MDC_IDC_SET_LEADCHNL_RA_PACING_ANODE_ELECTRODE_1: NORMAL
MDC_IDC_SET_LEADCHNL_RA_PACING_ANODE_ELECTRODE_1: NORMAL
MDC_IDC_SET_LEADCHNL_RA_PACING_ANODE_LOCATION_1: NORMAL
MDC_IDC_SET_LEADCHNL_RA_PACING_ANODE_LOCATION_1: NORMAL
MDC_IDC_SET_LEADCHNL_RA_PACING_CAPTURE_MODE: NORMAL
MDC_IDC_SET_LEADCHNL_RA_PACING_CAPTURE_MODE: NORMAL
MDC_IDC_SET_LEADCHNL_RA_PACING_CATHODE_ELECTRODE_1: NORMAL
MDC_IDC_SET_LEADCHNL_RA_PACING_CATHODE_ELECTRODE_1: NORMAL
MDC_IDC_SET_LEADCHNL_RA_PACING_CATHODE_LOCATION_1: NORMAL
MDC_IDC_SET_LEADCHNL_RA_PACING_CATHODE_LOCATION_1: NORMAL
MDC_IDC_SET_LEADCHNL_RA_PACING_POLARITY: NORMAL
MDC_IDC_SET_LEADCHNL_RA_PACING_POLARITY: NORMAL
MDC_IDC_SET_LEADCHNL_RA_PACING_PULSEWIDTH: 0.4 MS
MDC_IDC_SET_LEADCHNL_RA_PACING_PULSEWIDTH: 0.4 MS
MDC_IDC_SET_LEADCHNL_RA_SENSING_ANODE_ELECTRODE_1: NORMAL
MDC_IDC_SET_LEADCHNL_RA_SENSING_ANODE_ELECTRODE_1: NORMAL
MDC_IDC_SET_LEADCHNL_RA_SENSING_ANODE_LOCATION_1: NORMAL
MDC_IDC_SET_LEADCHNL_RA_SENSING_ANODE_LOCATION_1: NORMAL
MDC_IDC_SET_LEADCHNL_RA_SENSING_CATHODE_ELECTRODE_1: NORMAL
MDC_IDC_SET_LEADCHNL_RA_SENSING_CATHODE_ELECTRODE_1: NORMAL
MDC_IDC_SET_LEADCHNL_RA_SENSING_CATHODE_LOCATION_1: NORMAL
MDC_IDC_SET_LEADCHNL_RA_SENSING_CATHODE_LOCATION_1: NORMAL
MDC_IDC_SET_LEADCHNL_RA_SENSING_POLARITY: NORMAL
MDC_IDC_SET_LEADCHNL_RA_SENSING_POLARITY: NORMAL
MDC_IDC_SET_LEADCHNL_RA_SENSING_SENSITIVITY: 0.3 MV
MDC_IDC_SET_LEADCHNL_RA_SENSING_SENSITIVITY: 0.3 MV
MDC_IDC_SET_LEADCHNL_RV_PACING_AMPLITUDE: 2 V
MDC_IDC_SET_LEADCHNL_RV_PACING_AMPLITUDE: 2 V
MDC_IDC_SET_LEADCHNL_RV_PACING_ANODE_ELECTRODE_1: NORMAL
MDC_IDC_SET_LEADCHNL_RV_PACING_ANODE_ELECTRODE_1: NORMAL
MDC_IDC_SET_LEADCHNL_RV_PACING_ANODE_LOCATION_1: NORMAL
MDC_IDC_SET_LEADCHNL_RV_PACING_ANODE_LOCATION_1: NORMAL
MDC_IDC_SET_LEADCHNL_RV_PACING_CAPTURE_MODE: NORMAL
MDC_IDC_SET_LEADCHNL_RV_PACING_CAPTURE_MODE: NORMAL
MDC_IDC_SET_LEADCHNL_RV_PACING_CATHODE_ELECTRODE_1: NORMAL
MDC_IDC_SET_LEADCHNL_RV_PACING_CATHODE_ELECTRODE_1: NORMAL
MDC_IDC_SET_LEADCHNL_RV_PACING_CATHODE_LOCATION_1: NORMAL
MDC_IDC_SET_LEADCHNL_RV_PACING_CATHODE_LOCATION_1: NORMAL
MDC_IDC_SET_LEADCHNL_RV_PACING_POLARITY: NORMAL
MDC_IDC_SET_LEADCHNL_RV_PACING_POLARITY: NORMAL
MDC_IDC_SET_LEADCHNL_RV_PACING_PULSEWIDTH: 0.4 MS
MDC_IDC_SET_LEADCHNL_RV_PACING_PULSEWIDTH: 0.4 MS
MDC_IDC_SET_LEADCHNL_RV_SENSING_ANODE_ELECTRODE_1: NORMAL
MDC_IDC_SET_LEADCHNL_RV_SENSING_ANODE_ELECTRODE_1: NORMAL
MDC_IDC_SET_LEADCHNL_RV_SENSING_ANODE_LOCATION_1: NORMAL
MDC_IDC_SET_LEADCHNL_RV_SENSING_ANODE_LOCATION_1: NORMAL
MDC_IDC_SET_LEADCHNL_RV_SENSING_CATHODE_ELECTRODE_1: NORMAL
MDC_IDC_SET_LEADCHNL_RV_SENSING_CATHODE_ELECTRODE_1: NORMAL
MDC_IDC_SET_LEADCHNL_RV_SENSING_CATHODE_LOCATION_1: NORMAL
MDC_IDC_SET_LEADCHNL_RV_SENSING_CATHODE_LOCATION_1: NORMAL
MDC_IDC_SET_LEADCHNL_RV_SENSING_POLARITY: NORMAL
MDC_IDC_SET_LEADCHNL_RV_SENSING_POLARITY: NORMAL
MDC_IDC_SET_LEADCHNL_RV_SENSING_SENSITIVITY: 2.8 MV
MDC_IDC_SET_LEADCHNL_RV_SENSING_SENSITIVITY: 2.8 MV
MDC_IDC_SET_ZONE_DETECTION_INTERVAL: 350 MS
MDC_IDC_SET_ZONE_DETECTION_INTERVAL: 350 MS
MDC_IDC_SET_ZONE_DETECTION_INTERVAL: 400 MS
MDC_IDC_SET_ZONE_DETECTION_INTERVAL: 400 MS
MDC_IDC_SET_ZONE_STATUS: NORMAL
MDC_IDC_SET_ZONE_TYPE: NORMAL
MDC_IDC_SET_ZONE_VENDOR_TYPE: NORMAL
MDC_IDC_STAT_AT_BURDEN_PERCENT: 40 %
MDC_IDC_STAT_AT_BURDEN_PERCENT: 40 %
MDC_IDC_STAT_AT_DTM_END: NORMAL
MDC_IDC_STAT_AT_DTM_END: NORMAL
MDC_IDC_STAT_AT_DTM_START: NORMAL
MDC_IDC_STAT_AT_DTM_START: NORMAL
MDC_IDC_STAT_BRADY_AP_VP_PERCENT: 39.36 %
MDC_IDC_STAT_BRADY_AP_VP_PERCENT: 39.36 %
MDC_IDC_STAT_BRADY_AP_VS_PERCENT: 0.14 %
MDC_IDC_STAT_BRADY_AP_VS_PERCENT: 0.14 %
MDC_IDC_STAT_BRADY_AS_VP_PERCENT: 34.35 %
MDC_IDC_STAT_BRADY_AS_VP_PERCENT: 34.35 %
MDC_IDC_STAT_BRADY_AS_VS_PERCENT: 26.15 %
MDC_IDC_STAT_BRADY_AS_VS_PERCENT: 26.15 %
MDC_IDC_STAT_BRADY_DTM_END: NORMAL
MDC_IDC_STAT_BRADY_DTM_END: NORMAL
MDC_IDC_STAT_BRADY_DTM_START: NORMAL
MDC_IDC_STAT_BRADY_DTM_START: NORMAL
MDC_IDC_STAT_BRADY_RA_PERCENT_PACED: 34.75 %
MDC_IDC_STAT_BRADY_RA_PERCENT_PACED: 34.75 %
MDC_IDC_STAT_BRADY_RV_PERCENT_PACED: 72.08 %
MDC_IDC_STAT_BRADY_RV_PERCENT_PACED: 72.08 %
MDC_IDC_STAT_EPISODE_RECENT_COUNT: 0
MDC_IDC_STAT_EPISODE_RECENT_COUNT: 0
MDC_IDC_STAT_EPISODE_RECENT_COUNT: 11
MDC_IDC_STAT_EPISODE_RECENT_COUNT: 11
MDC_IDC_STAT_EPISODE_RECENT_COUNT: 3
MDC_IDC_STAT_EPISODE_RECENT_COUNT: 3
MDC_IDC_STAT_EPISODE_RECENT_COUNT: 964
MDC_IDC_STAT_EPISODE_RECENT_COUNT: 964
MDC_IDC_STAT_EPISODE_RECENT_COUNT_DTM_END: NORMAL
MDC_IDC_STAT_EPISODE_RECENT_COUNT_DTM_START: NORMAL
MDC_IDC_STAT_EPISODE_TOTAL_COUNT: 0
MDC_IDC_STAT_EPISODE_TOTAL_COUNT: 0
MDC_IDC_STAT_EPISODE_TOTAL_COUNT: 15
MDC_IDC_STAT_EPISODE_TOTAL_COUNT: 15
MDC_IDC_STAT_EPISODE_TOTAL_COUNT: 3
MDC_IDC_STAT_EPISODE_TOTAL_COUNT: 3
MDC_IDC_STAT_EPISODE_TOTAL_COUNT: 5986
MDC_IDC_STAT_EPISODE_TOTAL_COUNT: 5986
MDC_IDC_STAT_EPISODE_TOTAL_COUNT_DTM_END: NORMAL
MDC_IDC_STAT_EPISODE_TOTAL_COUNT_DTM_START: NORMAL
MDC_IDC_STAT_EPISODE_TYPE: NORMAL
MDC_IDC_STAT_TACHYTHERAPY_RECENT_DTM_END: NORMAL
MDC_IDC_STAT_TACHYTHERAPY_RECENT_DTM_END: NORMAL
MDC_IDC_STAT_TACHYTHERAPY_RECENT_DTM_START: NORMAL
MDC_IDC_STAT_TACHYTHERAPY_RECENT_DTM_START: NORMAL
MDC_IDC_STAT_TACHYTHERAPY_TOTAL_DTM_END: NORMAL
MDC_IDC_STAT_TACHYTHERAPY_TOTAL_DTM_END: NORMAL
MDC_IDC_STAT_TACHYTHERAPY_TOTAL_DTM_START: NORMAL
MDC_IDC_STAT_TACHYTHERAPY_TOTAL_DTM_START: NORMAL

## 2024-07-29 PROCEDURE — 255N000002 HC RX 255 OP 636: Performed by: UROLOGY

## 2024-07-29 PROCEDURE — 93286 PERI-PX EVAL PM/LDLS PM IP: CPT

## 2024-07-29 PROCEDURE — 72197 MRI PELVIS W/O & W/DYE: CPT | Mod: MG

## 2024-07-29 PROCEDURE — 71046 X-RAY EXAM CHEST 2 VIEWS: CPT

## 2024-07-29 PROCEDURE — A9585 GADOBUTROL INJECTION: HCPCS | Performed by: UROLOGY

## 2024-07-29 PROCEDURE — 93286 PERI-PX EVAL PM/LDLS PM IP: CPT | Mod: 26 | Performed by: INTERNAL MEDICINE

## 2024-07-29 PROCEDURE — 71046 X-RAY EXAM CHEST 2 VIEWS: CPT | Mod: 26 | Performed by: RADIOLOGY

## 2024-07-29 PROCEDURE — G1010 CDSM STANSON: HCPCS | Performed by: STUDENT IN AN ORGANIZED HEALTH CARE EDUCATION/TRAINING PROGRAM

## 2024-07-29 PROCEDURE — 72197 MRI PELVIS W/O & W/DYE: CPT | Mod: 26 | Performed by: STUDENT IN AN ORGANIZED HEALTH CARE EDUCATION/TRAINING PROGRAM

## 2024-07-29 RX ORDER — GADOBUTROL 604.72 MG/ML
0.1 INJECTION INTRAVENOUS ONCE
Status: COMPLETED | OUTPATIENT
Start: 2024-07-29 | End: 2024-07-29

## 2024-07-29 RX ADMIN — GADOBUTROL 9 ML: 604.72 INJECTION INTRAVENOUS at 12:53

## 2024-07-30 NOTE — PROGRESS NOTES
MRI reviewed and discussed with patient and spouse  Recommend fusion biopsy of prostate and nodule given PiRADS 4 and abnormal ROBERTO

## 2024-08-01 ENCOUNTER — LAB (OUTPATIENT)
Dept: LAB | Facility: CLINIC | Age: 84
End: 2024-08-01
Payer: MEDICARE

## 2024-08-01 DIAGNOSIS — R97.20 ELEVATED PSA: ICD-10-CM

## 2024-08-01 DIAGNOSIS — R39.198 SLOWING OF URINARY STREAM: ICD-10-CM

## 2024-08-01 PROCEDURE — 36415 COLL VENOUS BLD VENIPUNCTURE: CPT

## 2024-08-01 PROCEDURE — 84153 ASSAY OF PSA TOTAL: CPT

## 2024-08-02 LAB — PSA SERPL DL<=0.01 NG/ML-MCNC: 5.36 NG/ML

## 2024-08-06 DIAGNOSIS — Z79.2 PROPHYLACTIC ANTIBIOTIC: Primary | ICD-10-CM

## 2024-08-06 RX ORDER — CIPROFLOXACIN 500 MG/1
TABLET, FILM COATED ORAL
Qty: 6 TABLET | Refills: 0 | Status: SHIPPED | OUTPATIENT
Start: 2024-08-06

## 2024-08-19 ENCOUNTER — ALLIED HEALTH/NURSE VISIT (OUTPATIENT)
Dept: UROLOGY | Facility: CLINIC | Age: 84
End: 2024-08-19
Payer: MEDICARE

## 2024-08-19 ENCOUNTER — TELEPHONE (OUTPATIENT)
Dept: UROLOGY | Facility: CLINIC | Age: 84
End: 2024-08-19

## 2024-08-19 DIAGNOSIS — R39.198 SLOWING OF URINARY STREAM: Primary | ICD-10-CM

## 2024-08-19 PROCEDURE — 51798 US URINE CAPACITY MEASURE: CPT

## 2024-08-19 RX ORDER — OXYBUTYNIN CHLORIDE 10 MG/1
10 TABLET, EXTENDED RELEASE ORAL DAILY
Qty: 30 TABLET | Refills: 0 | Status: SHIPPED | OUTPATIENT
Start: 2024-08-19

## 2024-08-19 NOTE — TELEPHONE ENCOUNTER
Spoke with pt on behalf of Dr. Carcamo. He requested pt come into clinic for a nurse visit w/ PVR +/- CIC teaching. This was arranged for today, 08/19.     FERCHO Le  Care Coordinator- Urology   [phone]

## 2024-08-20 NOTE — PROGRESS NOTES
Chief Complaint   Patient presents with    Allied Health Visit     PVR       Patient Active Problem List   Diagnosis    Macular hole of right eye    BPH (benign prostatic hyperplasia)       No Known Allergies    Current Outpatient Medications   Medication Sig Dispense Refill    oxyBUTYnin ER (DITROPAN XL) 10 MG 24 hr tablet Take 1 tablet (10 mg) by mouth daily 30 tablet 0    acetaminophen (TYLENOL) 500 MG tablet Take 500 mg by mouth every 6 hours as needed for mild pain (Patient not taking: Reported on 2023)      allopurinol (ZYLOPRIM) 100 MG tablet Take 2 tablets by mouth daily (2 x 100 mg = 200 mg)      apixaban ANTICOAGULANT (ELIQUIS) 5 MG tablet Take 1 tablet (5 mg) by mouth 2 times daily      atorvastatin (LIPITOR) 20 MG tablet Take 1 tablet by mouth At Bedtime      CHOLECALCIFEROL PO Take 1 tablet by mouth daily OTC uncertain of dose.      ciprofloxacin (CIPRO) 500 MG tablet Take 1 tab in the AM and 1 tab in the PM the day before your procedure, day of and day after. 6 tablet 0    diclofenac (VOLTAREN) 75 MG EC tablet Take 75 mg by mouth 2 times daily as needed for moderate pain      dronedarone (MULTAQ) 400 MG TABS tablet Take 1 tablet by mouth daily      esomeprazole (NEXIUM) 20 MG DR capsule Take 2 capsules by mouth every morning (before breakfast) Take 30-60 minutes before eating.      ibuprofen (ADVIL/MOTRIN) 200 MG tablet Take 400 mg by mouth every 6 hours as needed for pain      metoprolol succinate ER (TOPROL XL) 25 MG 24 hr tablet Take 1 tablet by mouth daily      tamsulosin (FLOMAX) 0.4 MG capsule Take 1 capsule (0.4 mg) by mouth daily 30 capsule 3    traZODone (DESYREL) 50 MG tablet Take 50 mg by mouth At Bedtime         Social History     Tobacco Use    Smoking status: Former     Current packs/day: 0.00     Types: Cigarettes     Quit date:      Years since quittin.6    Smokeless tobacco: Never   Substance Use Topics    Alcohol use: Yes     Comment: Sometimes    Drug use: Not Currently  "      Mushtaq Olsen comes into clinic today at the request of Dr. Carcamo for PVR.    Patient diagnosis: urinary frequency     Residual Volume by Ultrasound: 0    RN spoke with Darian Page PA-C who was in clinic at the time of appointment as Dr. Carcamo is out of clinic for the week. Advised of PVR 0 via multiple scans, however pt reports frequent episodes where he \"can't pee, but it feels like I have to pee.\" This is worse at night. Disc'd limiting bladder irritants and fluid intake after 1800. Advised that placing a marley catheter will not help these symptoms and may make them worse. Per Darian, tammy trial of oxybutynin (myrbetriq and trospirum not covered by insurance), suspect may be having sx of OAB. If not improvement, rec cysto next available with Dr. Carcamo.      This service provided today was under the direct supervision of Darian Page PA-C, who was available if needed.    Mimi Alex RN  8/20/2024  8:53 AM    "

## 2024-08-26 ENCOUNTER — TELEPHONE (OUTPATIENT)
Dept: UROLOGY | Facility: CLINIC | Age: 84
End: 2024-08-26
Payer: MEDICARE

## 2024-08-27 ENCOUNTER — OFFICE VISIT (OUTPATIENT)
Dept: UROLOGY | Facility: CLINIC | Age: 84
End: 2024-08-27
Payer: MEDICARE

## 2024-08-27 VITALS
HEIGHT: 70 IN | BODY MASS INDEX: 29.35 KG/M2 | WEIGHT: 205 LBS | SYSTOLIC BLOOD PRESSURE: 160 MMHG | DIASTOLIC BLOOD PRESSURE: 98 MMHG | OXYGEN SATURATION: 96 % | HEART RATE: 74 BPM

## 2024-08-27 DIAGNOSIS — R39.198 SLOWING OF URINARY STREAM: Primary | ICD-10-CM

## 2024-08-27 DIAGNOSIS — N40.1 BENIGN LOCALIZED PROSTATIC HYPERPLASIA WITH LOWER URINARY TRACT SYMPTOMS (LUTS): ICD-10-CM

## 2024-08-27 PROCEDURE — 52000 CYSTOURETHROSCOPY: CPT | Performed by: UROLOGY

## 2024-08-27 PROCEDURE — 51798 US URINE CAPACITY MEASURE: CPT | Performed by: UROLOGY

## 2024-08-27 PROCEDURE — 51741 ELECTRO-UROFLOWMETRY FIRST: CPT | Performed by: UROLOGY

## 2024-08-27 PROCEDURE — 99214 OFFICE O/P EST MOD 30 MIN: CPT | Mod: 25 | Performed by: UROLOGY

## 2024-08-27 RX ORDER — LIDOCAINE HYDROCHLORIDE 20 MG/ML
JELLY TOPICAL ONCE
Status: COMPLETED | OUTPATIENT
Start: 2024-08-27 | End: 2024-08-27

## 2024-08-27 RX ADMIN — LIDOCAINE HYDROCHLORIDE: 20 JELLY TOPICAL at 15:11

## 2024-08-27 ASSESSMENT — PAIN SCALES - GENERAL: PAINLEVEL: NO PAIN (0)

## 2024-08-27 NOTE — NURSING NOTE
"Chief Complaint   Patient presents with    Follow Up     Possible cysto       Blood pressure (!) 160/98, pulse 74, height 1.778 m (5' 10\"), weight 93 kg (205 lb), SpO2 96%. Body mass index is 29.41 kg/m .    Patient Active Problem List   Diagnosis    Macular hole of right eye    BPH (benign prostatic hyperplasia)       No Known Allergies    Current Outpatient Medications   Medication Sig Dispense Refill    acetaminophen (TYLENOL) 500 MG tablet Take 500 mg by mouth every 6 hours as needed for mild pain (Patient not taking: Reported on 6/27/2023)      allopurinol (ZYLOPRIM) 100 MG tablet Take 2 tablets by mouth daily (2 x 100 mg = 200 mg)      apixaban ANTICOAGULANT (ELIQUIS) 5 MG tablet Take 1 tablet (5 mg) by mouth 2 times daily      atorvastatin (LIPITOR) 20 MG tablet Take 1 tablet by mouth At Bedtime      CHOLECALCIFEROL PO Take 1 tablet by mouth daily OTC uncertain of dose.      ciprofloxacin (CIPRO) 500 MG tablet Take 1 tab in the AM and 1 tab in the PM the day before your procedure, day of and day after. 6 tablet 0    diclofenac (VOLTAREN) 75 MG EC tablet Take 75 mg by mouth 2 times daily as needed for moderate pain      dronedarone (MULTAQ) 400 MG TABS tablet Take 1 tablet by mouth daily      esomeprazole (NEXIUM) 20 MG DR capsule Take 2 capsules by mouth every morning (before breakfast) Take 30-60 minutes before eating.      ibuprofen (ADVIL/MOTRIN) 200 MG tablet Take 400 mg by mouth every 6 hours as needed for pain      metoprolol succinate ER (TOPROL XL) 25 MG 24 hr tablet Take 1 tablet by mouth daily      oxyBUTYnin ER (DITROPAN XL) 10 MG 24 hr tablet Take 1 tablet (10 mg) by mouth daily 30 tablet 0    tamsulosin (FLOMAX) 0.4 MG capsule Take 1 capsule (0.4 mg) by mouth daily 30 capsule 3    traZODone (DESYREL) 50 MG tablet Take 50 mg by mouth At Bedtime         Social History     Tobacco Use    Smoking status: Former     Current packs/day: 0.00     Types: Cigarettes     Quit date: 1983     Years since " quittin.6    Smokeless tobacco: Never   Substance Use Topics    Alcohol use: Yes     Comment: Sometimes    Drug use: Not Currently       Invasive Procedure Safety Checklist:    Procedure: Cystoscopy    Action: Complete sections and checkboxes as appropriate.    Pre-procedure:  1. Patient ID Verified with 2 identifiers (Brittany and  or MRN) : YES    2. Procedure and site verified with patient/designee (when able) : YES    3. Accurate consent documentation in medical record : YES    4. H&P (or appropriate assessment) documented in medical record : N/A  H&P must be up to 30 days prior to procedure an updated within 24 hours of                 Procedure as applicable.     5. Relevant diagnostic and radiology test results appropriately labeled and displayed as applicable : YES    6. Blood products, implants, devices, and/or special equipment available for the procedure as applicable : YES    7. Procedure site(s) marked with provider initials [Exclusions: none] : NO    8. Marking not required. Reason : Yes  Procedure does not require site marking    Time Out:     Time-Out performed immediately prior to starting procedure, including verbal and active participation of all team members addressing: YES    1. Correct patient identity.  2. Confirmed that the correct side and site are marked.  3. An accurate procedure to be done.  4. Agreement on the procedure to be done.  5. Correct patient position.  6. Relevant images and results are properly labeled and appropriately displayed.  7. The need to administer antibiotics or fluids for irrigation purposes during the procedure as applicable.  8. Safety precautions based on patient history or medication use.    During Procedure: Verification of correct person, site, and procedure occurs any time the responsibility for care of the patient is transferred to another member of the care team.    The following medication was given:     MEDICATION:  Lidocaine without epinephrine 2%  jelly  ROUTE: urethral   SITE: urethral   DOSE: 10 mL  LOT #: [id number]  : [employer]  EXPIRATION DATE: 3-26  NDC#: [address]-9   Was there drug waste? No    Prior to med admin, verified patient identity using patient's name and date of birth.  Due to med administration, patient instructed to remain in clinic for 15 minutes  afterwards, and to report any adverse reaction to me immediately.    Drug Amount Wasted:  None.  Vial/Syringe: Syringe      Christy Manley  8/27/2024  2:55 PM

## 2024-08-27 NOTE — NURSING NOTE
"Chief Complaint   Patient presents with    Follow Up     Possible cysto       Blood pressure (!) 160/98, pulse 74, height 1.778 m (5' 10\"), weight 93 kg (205 lb), SpO2 96%. Body mass index is 29.41 kg/m .    Patient Active Problem List   Diagnosis    Macular hole of right eye    BPH (benign prostatic hyperplasia)       No Known Allergies    Current Outpatient Medications   Medication Sig Dispense Refill    acetaminophen (TYLENOL) 500 MG tablet Take 500 mg by mouth every 6 hours as needed for mild pain (Patient not taking: Reported on 6/27/2023)      allopurinol (ZYLOPRIM) 100 MG tablet Take 2 tablets by mouth daily (2 x 100 mg = 200 mg)      apixaban ANTICOAGULANT (ELIQUIS) 5 MG tablet Take 1 tablet (5 mg) by mouth 2 times daily      atorvastatin (LIPITOR) 20 MG tablet Take 1 tablet by mouth At Bedtime      CHOLECALCIFEROL PO Take 1 tablet by mouth daily OTC uncertain of dose.      ciprofloxacin (CIPRO) 500 MG tablet Take 1 tab in the AM and 1 tab in the PM the day before your procedure, day of and day after. 6 tablet 0    diclofenac (VOLTAREN) 75 MG EC tablet Take 75 mg by mouth 2 times daily as needed for moderate pain      dronedarone (MULTAQ) 400 MG TABS tablet Take 1 tablet by mouth daily      esomeprazole (NEXIUM) 20 MG DR capsule Take 2 capsules by mouth every morning (before breakfast) Take 30-60 minutes before eating.      ibuprofen (ADVIL/MOTRIN) 200 MG tablet Take 400 mg by mouth every 6 hours as needed for pain      metoprolol succinate ER (TOPROL XL) 25 MG 24 hr tablet Take 1 tablet by mouth daily      oxyBUTYnin ER (DITROPAN XL) 10 MG 24 hr tablet Take 1 tablet (10 mg) by mouth daily 30 tablet 0    tamsulosin (FLOMAX) 0.4 MG capsule Take 1 capsule (0.4 mg) by mouth daily 30 capsule 3    traZODone (DESYREL) 50 MG tablet Take 50 mg by mouth At Bedtime         Social History     Tobacco Use    Smoking status: Former     Current packs/day: 0.00     Types: Cigarettes     Quit date: 1983     Years since " quittin.6    Smokeless tobacco: Never   Substance Use Topics    Alcohol use: Yes     Comment: Sometimes    Drug use: Not Currently       Christy Manley  2024  2:40 PM

## 2024-08-27 NOTE — LETTER
8/27/2024       RE: Mushtaq Olsen  [address] [place]  [place] [address]     Dear Colleague,    Thank you for referring your patient, Mushtaq Olsen, to the [facility] UROLOGY CLINIC [place] at [facility]. Please see a copy of my visit note below.    [facility] COMPREHENSIVE LOWER URINARY TRACT SYMPTOM EVALUATION    Reason for cystoscopy: Nocturia, hesitancy    Brief History: 84-year-old male with multiple prior transurethral prostate procedures including a laser enucleation with me about a year ago.  Recently he has been endorsing a very slow bothersome urinary stream particularly at nighttime where he wakes up frequently about once an hour with a feeling like he needs to void but a very slow stream and difficulty emptying.  His postvoid residuals on bladder scans have been low.  We had some concern that his symptoms may be representative of a bladder neck contracture so he brought him in for cystoscopy today.  He also has an abnormal finding on an MRI in the setting of a prostate nodule and is scheduled for an upcoming prostate biopsy.    CYSTOSCOPY  After obtaining informed consent, the patient was prepped and draped in the standard sterile fashion.  The 15 Egyptian flexible cystoscope was inserted through the urethral meatus.      The anterior urethra was:  normal without stricture.    The external sphincter was  appropriately coapted.   The prostatic urethra demonstrated an open fossa.  There was perhaps a little bit of remnant adenoma consistent with prior procedures but none of these were at all occlusive.   The bladder neck was widely patent and nonocclusive.    The bladder was  unremarkable for tumors, erythema or stones.    The ureteral orifices  were identified on each side in orthotopic position with efflux of clear urine.   There were moderate trabeculations.    On retroflexion there was the usual bladder neck hyperemia.     There was no intravesical protrusion of the prostate.      The patient tolerated the procedure well without complication.      UROFLOW/PVR  Voided volume 430 mL  Postvoid residual 20 mL  Qmax/Q average 10 mL/s    EVALUATION AND MANAGEMENT   SUMMARY: 84-year-old male with bothersome lower urinary tract symptoms  -No objective evidence for bladder outlet obstruction or stricture.  We had a detailed discussion with the patient and his spouse regarding possible etiologies which could include obstruction from malignancy versus detrusor hypocontractility.  I am most suspicious that his bladder is not squeezing.  This could be related to known lumbosacral spinal disc degeneration with prior treatments.  We we will teach him how to perform intermittent catheterization to use at nighttime as a way to perhaps improve quality of life.  Will also plan to get him urodynamics to further assess bladder pressures during voiding.  Will continue with prostate cancer evaluation as well.      30minutes spent on the date of the encounter, including direct interaction with the patient, performing chart review, documentation and further activities as noted above, exclusive of the time spent performing  cystoscopy    I, Nilson Carcamo saw and evaluated this patient and agree with the plan as stated above.  I personally performed all listed procedures.      Again, thank you for allowing me to participate in the care of your patient.      Sincerely,    Nilson Carcamo MD

## 2024-08-29 ENCOUNTER — PRE VISIT (OUTPATIENT)
Dept: UROLOGY | Facility: CLINIC | Age: 84
End: 2024-08-29
Payer: MEDICARE

## 2024-08-29 DIAGNOSIS — R39.198 SLOWING OF URINARY STREAM: Primary | ICD-10-CM

## 2024-08-29 NOTE — PROGRESS NOTES
[facility] COMPREHENSIVE LOWER URINARY TRACT SYMPTOM EVALUATION    Reason for cystoscopy: Nocturia, hesitancy    Brief History: 84-year-old male with multiple prior transurethral prostate procedures including a laser enucleation with me about a year ago.  Recently he has been endorsing a very slow bothersome urinary stream particularly at nighttime where he wakes up frequently about once an hour with a feeling like he needs to void but a very slow stream and difficulty emptying.  His postvoid residuals on bladder scans have been low.  We had some concern that his symptoms may be representative of a bladder neck contracture so he brought him in for cystoscopy today.  He also has an abnormal finding on an MRI in the setting of a prostate nodule and is scheduled for an upcoming prostate biopsy.    CYSTOSCOPY  After obtaining informed consent, the patient was prepped and draped in the standard sterile fashion.  The 15 Sami flexible cystoscope was inserted through the urethral meatus.      The anterior urethra was:  normal without stricture.    The external sphincter was  appropriately coapted.   The prostatic urethra demonstrated an open fossa.  There was perhaps a little bit of remnant adenoma consistent with prior procedures but none of these were at all occlusive.   The bladder neck was widely patent and nonocclusive.    The bladder was  unremarkable for tumors, erythema or stones.    The ureteral orifices  were identified on each side in orthotopic position with efflux of clear urine.   There were moderate trabeculations.    On retroflexion there was the usual bladder neck hyperemia.    There was no intravesical protrusion of the prostate.      The patient tolerated the procedure well without complication.      UROFLOW/PVR  Voided volume 430 mL  Postvoid residual 20 mL  Qmax/Q average 10 mL/s    EVALUATION AND MANAGEMENT   SUMMARY: 84-year-old male with bothersome lower urinary tract symptoms  -No  objective evidence for bladder outlet obstruction or stricture.  We had a detailed discussion with the patient and his spouse regarding possible etiologies which could include obstruction from malignancy versus detrusor hypocontractility.  I am most suspicious that his bladder is not squeezing.  This could be related to known lumbosacral spinal disc degeneration with prior treatments.  We we will teach him how to perform intermittent catheterization to use at nighttime as a way to perhaps improve quality of life.  Will also plan to get him urodynamics to further assess bladder pressures during voiding.  Will continue with prostate cancer evaluation as well.      30minutes spent on the date of the encounter, including direct interaction with the patient, performing chart review, documentation and further activities as noted above, exclusive of the time spent performing  cystoscopy    I, Nilson Carcamo saw and evaluated this patient and agree with the plan as stated above.  I personally performed all listed procedures.

## 2024-08-29 NOTE — TELEPHONE ENCOUNTER
Reason for visit: UDS    Study scheduled because: nocturia, urinary hesitancy    Relevant information: suspicious that bladder isn't squeezing correctly    Records/imaging/labs/orders: all records available    UA/UC ordered: yes, hx UTIs    Yolanda Culver  8/29/2024  12:06 PM

## 2024-09-13 ENCOUNTER — OFFICE VISIT (OUTPATIENT)
Dept: UROLOGY | Facility: CLINIC | Age: 84
End: 2024-09-13
Payer: MEDICARE

## 2024-09-13 VITALS
DIASTOLIC BLOOD PRESSURE: 86 MMHG | HEART RATE: 70 BPM | WEIGHT: 210 LBS | BODY MASS INDEX: 30.06 KG/M2 | OXYGEN SATURATION: 97 % | HEIGHT: 70 IN | SYSTOLIC BLOOD PRESSURE: 159 MMHG

## 2024-09-13 DIAGNOSIS — R97.20 ELEVATED PSA: Primary | ICD-10-CM

## 2024-09-13 PROCEDURE — 76999 ECHO EXAMINATION PROCEDURE: CPT | Performed by: UROLOGY

## 2024-09-13 PROCEDURE — 76872 US TRANSRECTAL: CPT | Performed by: UROLOGY

## 2024-09-13 PROCEDURE — 55700 PR BIOPSY OF PROSTATE,NEEDLE/PUNCH: CPT | Performed by: UROLOGY

## 2024-09-13 PROCEDURE — G0416 PROSTATE BIOPSY, ANY MTHD: HCPCS | Performed by: PATHOLOGY

## 2024-09-13 RX ORDER — GENTAMICIN 40 MG/ML
80 INJECTION, SOLUTION INTRAMUSCULAR; INTRAVENOUS ONCE
Status: COMPLETED | OUTPATIENT
Start: 2024-09-13 | End: 2024-09-13

## 2024-09-13 RX ORDER — LIDOCAINE HYDROCHLORIDE 10 MG/ML
10 INJECTION, SOLUTION EPIDURAL; INFILTRATION; INTRACAUDAL; PERINEURAL ONCE
Status: COMPLETED | OUTPATIENT
Start: 2024-09-13 | End: 2024-09-13

## 2024-09-13 RX ADMIN — LIDOCAINE HYDROCHLORIDE 10 ML: 10 INJECTION, SOLUTION EPIDURAL; INFILTRATION; INTRACAUDAL; PERINEURAL at 13:40

## 2024-09-13 RX ADMIN — GENTAMICIN 80 MG: 40 INJECTION, SOLUTION INTRAMUSCULAR; INTRAVENOUS at 13:25

## 2024-09-13 ASSESSMENT — PAIN SCALES - GENERAL: PAINLEVEL: EXTREME PAIN (8)

## 2024-09-13 NOTE — PATIENT INSTRUCTIONS
[facility]  Transrectal Ultrasound  Post Operative Information    The physician who performed your Transrectal Ultrasound is Dr. Kumari. Please call [phone] with any concerns.  Please contact this doctor if you have any problems or questions.  If unable to reach your doctor, please return to the Emergency Department.    Take one antibiotic the evening of the procedure and then as directed on your prescription.  Drink at least 6-8 glasses of fluids for the first 48 hours.  Avoid heavy lifting and strenuous activity for 48 hours.  Avoid sexual intercourse for the first 24 hours.  No aspirin or ibuprofen products (Motrin, Advil, Nuprin, ect.) for one week.  You may take acetaminophen (Tylenol) for pain. You may take 2-500mg extra strength tylenol every 6 hours, not to exceed the daily recommendation of 4,000mg.   You may notice a small amount of blood on the tissue after a bowel movement.  You may pass blood with clots in your urine following the procedure.  The amount will decrease with time but may be visible for up to two weeks. If you have trouble urinating due to a possible blood clot, please call our office.  You make have blood in your semen for 4 weeks after the procedure.  You may experience mild perineal (groin area) discomfort after the procedure. If you were not prescribed a sedative, you may take a tub soak to alleviate groin discomfort.   Please call you doctor if you have any of the follow symptoms:  Fever over 101.1  Increase in the amount of blood passed, dark-red blood cherry color urine/Trouble Urinating  Severe discomfort or pain not well managed with methods above

## 2024-09-13 NOTE — LETTER
9/13/2024       RE: Mushtaq Olsen  [address] [place]  [place] [address]     Dear Colleague,    Thank you for referring your patient, Mushtaq Olsen, to the [facility] UROLOGY CLINIC [place] at [facility]. Please see a copy of my visit note below.    PREPROCEDURE DIAGNOSIS: Elevated PSA   POSTPROCEDURE DIAGNOSIS: Elevated PSA  PROCEDURE: Transrectal ultrasound sizing and transrectal ultrasound guided prostatic needle biopsy, including MRI fusion targeting  for Mushtaq Olsen who is a 84 year old male.   SURGEON: Sandy Kumari MD   ANESTHESIA: 5 mL of 1% periprostatic block bilaterally   We previously obtained an MRI of the prostate and identified all PIRADS 3-5 lesions for targeting    DESCRIPTION OF PROCEDURE: The procedure, the outcome, the anesthesia, and the risks were discussed with the patient. Informed consent was obtained and signed and a timeout was completed prior to the procedure. Digital rectal examination was performed with the below findings noted. Anesthesia was administered as noted above and the transrectal ultrasound probe was inserted, sizing was performed, and the below findings were noted. 2 core biopsies were taken from each of the MRI targets, and 12 core biopsies were taken as described below. The probe was then withdrawn. Patient tolerated the procedure well.   FINDINGS: Digital rectal exam reveals normal prostate. US images were obtained and then fused with the MRI images.  The fused images were then used to guide the biopsy of the targeted lesions with 2 cores taken of each lesion.  We then performed random biopsies.  12 cores were taken with 6 on each side, base, mid and apex.      Sandy Kumari MD   Department of Urology   [facility]         Again, thank you for allowing me to participate in the care of your patient.      Sincerely,    Sandy Kumari MD

## 2024-09-13 NOTE — PROGRESS NOTES
PREPROCEDURE DIAGNOSIS: Elevated PSA   POSTPROCEDURE DIAGNOSIS: Elevated PSA  PROCEDURE: Transrectal ultrasound sizing and transrectal ultrasound guided prostatic needle biopsy, including MRI fusion targeting  for Mushtaq Olsen who is a 84 year old male.   SURGEON: Sandy Kumari MD   ANESTHESIA: 5 mL of 1% periprostatic block bilaterally   We previously obtained an MRI of the prostate and identified all PIRADS 3-5 lesions for targeting    DESCRIPTION OF PROCEDURE: The procedure, the outcome, the anesthesia, and the risks were discussed with the patient. Informed consent was obtained and signed and a timeout was completed prior to the procedure. Digital rectal examination was performed with the below findings noted. Anesthesia was administered as noted above and the transrectal ultrasound probe was inserted, sizing was performed, and the below findings were noted. 2 core biopsies were taken from each of the MRI targets, and 12 core biopsies were taken as described below. The probe was then withdrawn. Patient tolerated the procedure well.   FINDINGS: Digital rectal exam reveals normal prostate. US images were obtained and then fused with the MRI images.  The fused images were then used to guide the biopsy of the targeted lesions with 2 cores taken of each lesion.  We then performed random biopsies.  12 cores were taken with 6 on each side, base, mid and apex.      Sandy Kumari MD   Department of Urology   [facility]

## 2024-09-13 NOTE — NURSING NOTE
The following medication was given by MD:     MEDICATION:  Lidocaine 1% Soln  ROUTE: Local Infiltration   SITE: Prostate  DOSE: 100mg/10ml  LOT #: [id number]  : [employer]  EXPIRATION DATE: 01-  NDC#: 5429-8276-14  Was there drug waste? Yes  Amount of drug waste (mL): 10.  Reason for waste:  As per MD  Multi-dose vial: Yes     Clinic Administered Medication Documentation        Patient was given 100mg/10ml 1%lidocaine. Prior to medication administration, verified patient's identity using patient s name and date of birth. Please see MAR and medication order for additional information. Patient instructed to remain in clinic for 15 minutes and report any adverse reaction to staff immediately.    Vial/Syringe: Multi dose vial  Sara Beebe [facility]

## 2024-09-13 NOTE — NURSING NOTE
Chief Complaint   Patient presents with    Elevated PSA     Patient is here for Transrectal Ultrasound/MRI Guided Prostate Biopsies      Procedure was explained to patient prior to performing said procedure.  The patient signed the [facility] consent form and all questions were answered prior to the procedure.  Any pre-procedural antibiotics were given according to the performing physician's recommendation.  Patient reviewed information on the labels attached to samples and confirmed the accuracy of all of the labels.     Performing Physician:  Dr. Kumari  Antibiotic taken?  no  Aspirin or other blood thinning medications discontinued 7-10 days:  yes  Time of enema:  10:30am  Patient given Gentamicin intramuscular injection 30 minutes prior to procedure  Yes    The following medication was given:     MEDICATION: Gentamicin   ROUTE: IM  SITE: LUQ - Gluteus  DOSE:80mg/2ml  LOT #: [id number]  : [employer]!  EXPIRATION DATE: 09/2025  NDC#: 59460-804-26   Was there drug waste? No  Multi-dose vial: Yes     Clinic Administered Medication Documentation        Patient was given 80mg/2ml Gentamicin. Prior to medication administration, verified patient's identity using patient s name and date of birth. Please see MAR and medication order for additional information. Patient instructed to remain in clinic for 15 minutes and report any adverse reaction to staff immediately.    Vial/Syringe: Single dose vial. Was entire vial of medication used? Yes     Patient tolerated injection well. Stayed in clinic for up to 15 minutes, with no adverse reaction.      Twelve samples were taken from the right and left, medial and lateral base, mid and apex of the prostate respectively.  two additional samples were taken from Target area.  Vitals were repeated prior to patient leaving and instructions for post TRUS care were explained to the patient.      Domonique Coto LPN

## 2024-09-17 LAB
PATH REPORT.COMMENTS IMP SPEC: ABNORMAL
PATH REPORT.COMMENTS IMP SPEC: ABNORMAL
PATH REPORT.COMMENTS IMP SPEC: YES
PATH REPORT.FINAL DX SPEC: ABNORMAL
PATH REPORT.GROSS SPEC: ABNORMAL
PATH REPORT.MICROSCOPIC SPEC OTHER STN: ABNORMAL
PATH REPORT.RELEVANT HX SPEC: ABNORMAL
PHOTO IMAGE: ABNORMAL

## 2024-09-20 ENCOUNTER — TELEPHONE (OUTPATIENT)
Dept: UROLOGY | Facility: CLINIC | Age: 84
End: 2024-09-20
Payer: MEDICARE

## 2024-09-20 NOTE — TELEPHONE ENCOUNTER
Let pt know they should keep UDS appt and moved appt with Dr. Kumari up to an in person appt.    Chantel Yates RN, BSN  [facility]

## 2024-09-21 ENCOUNTER — PRE VISIT (OUTPATIENT)
Dept: UROLOGY | Facility: CLINIC | Age: 84
End: 2024-09-21
Payer: MEDICARE

## 2024-09-21 NOTE — TELEPHONE ENCOUNTER
Reason for visit: follow up biopsy results     Relevant information: prostate biopsy 9/13/24    Records/imaging/labs/orders: pathology resulted    Pt called: No need for a call    At Rooming: james Fischer  9/21/2024  6:13 PM

## 2024-09-23 ENCOUNTER — TELEPHONE (OUTPATIENT)
Dept: UROLOGY | Facility: CLINIC | Age: 84
End: 2024-09-23
Payer: MEDICARE

## 2024-09-23 DIAGNOSIS — C61 PROSTATE CANCER (H): Primary | ICD-10-CM

## 2024-09-23 NOTE — TELEPHONE ENCOUNTER
Spoke to spouse who reports someone tried to call her to get the PET PSMA scheduled but the call kept disconnecting. Reached out to Silvio to see if he can try again.    Chantel Yates RN, BSN  [facility]

## 2024-09-23 NOTE — TELEPHONE ENCOUNTER
.Writer called pt to discuss scheduling a urinalysis prior to their upcoming Urodynamics appointment.    Pt did  the phone, and consented to letting wife translate for him. Writer did not leave a voicemail for the pt.     Pt decided to schedule a urinalysis appointment with a lab, and understands that they will be contacted and have antibiotics sent to them if their test comes back positive for a UTI.     Pt states that they will be getting their urine test done at [facility], a fax number wasn't needed.     Pt states understanding of all discussed during the call.     Yolanda Culver, EMT

## 2024-09-24 ENCOUNTER — LAB (OUTPATIENT)
Dept: LAB | Facility: CLINIC | Age: 84
End: 2024-09-24
Payer: MEDICARE

## 2024-09-24 DIAGNOSIS — R39.198 SLOWING OF URINARY STREAM: ICD-10-CM

## 2024-09-24 LAB
ALBUMIN UR-MCNC: NEGATIVE MG/DL
APPEARANCE UR: CLEAR
BILIRUB UR QL STRIP: NEGATIVE
COLOR UR AUTO: YELLOW
GLUCOSE UR STRIP-MCNC: NEGATIVE MG/DL
HGB UR QL STRIP: NEGATIVE
KETONES UR STRIP-MCNC: NEGATIVE MG/DL
LEUKOCYTE ESTERASE UR QL STRIP: NEGATIVE
MUCOUS THREADS #/AREA URNS LPF: PRESENT /LPF
NITRATE UR QL: NEGATIVE
PH UR STRIP: 5.5 [PH] (ref 5–7)
RBC URINE: 4 /HPF
SP GR UR STRIP: 1.02 (ref 1–1.03)
UROBILINOGEN UR STRIP-MCNC: NORMAL MG/DL
WBC URINE: 3 /HPF

## 2024-09-24 PROCEDURE — 87086 URINE CULTURE/COLONY COUNT: CPT | Performed by: NURSE PRACTITIONER

## 2024-09-24 PROCEDURE — 81001 URINALYSIS AUTO W/SCOPE: CPT | Performed by: PATHOLOGY

## 2024-09-24 PROCEDURE — 99000 SPECIMEN HANDLING OFFICE-LAB: CPT | Performed by: PATHOLOGY

## 2024-09-25 LAB — BACTERIA UR CULT: NORMAL

## 2024-09-27 NOTE — PROGRESS NOTES
PREPROCEDURE DIAGNOSES:    1. Slowing of stream one year out from [facility]P in 05/2023    POSTPROCEDURE DIAGNOSES:  -Technically complicated study due to inconsistent pressures from catheters, mostly the Pabd, despite catheters being adjusted multiple times during the study.  -Normal bladder capacity (402 mL) with normal filling sensations.  -Due to inconsistent pressures from the Pabd catheter, focus placed on Pves pressures. This remains largely stable throughout the study, and therefore compliance appears good.  -No rise in Pves pressure during attempt to void, both on the UDS chair and on commode, suggesting no coordinated detrusor contraction.   -With catheters removed, he is able to void 350 mL into a hat in the toilet, for a final PVR of 52 mL, consistent with pre-study cathed PVR.   -Fluoroscopy reveals a mildly-trabeculated bladder wall without diverticulae or cellules.  No vesicoureteral reflux was observed.  The bladder neck was closed during filling and during initial attempt to void on the UDS chair.     PROCEDURE:    -Sterile urethral catheterization for measurement of postvoid residual urine volume.  -Complex filling cystometrogram with measurement of bladder and rectal pressures.  -Complex voiding cystometrogram with measurement of bladder and rectal pressures.  -Electromyography of the pelvic floor during urodynamics.  -Fluoroscopic imaging of the bladder during urodynamics, at least 3 views.    -Interpretation of urodynamics and flouroscopic imaging.      INDICATIONS FOR PROCEDURE:  Mr. Mushtaq Olsen is a pleasant 84 year old male who presents for video urodynamic assessment. VUDS is requested today by Dr. Carcamo to better characterize Mr. Mushtaq Olsen's voiding dysfunction.      DESCRIPTION OF PROCEDURE:  Risks, benefits, and alternatives to urodynamics were discussed with the patient and he wished to proceed.  Urodynamics are planned to better assess the primary etiology for Mr. Olsen's  urologic dysfunction.  After informed consent was obtained, the patient was taken to the procedure room where the study was initiated. Findings below.     Next a 7F double-lumen urodynamics catheter was inserted into the bladder under sterile technique via the urethra.  A 7F abdominal manometry catheter was placed in the rectum.  EMG pads were placed on both sides of the anal verge.  The bladder was filled with 100 mL of Omnipaque at 30 mL/minute and serial pressures were recorded.  With coughing there was an appropriate rise in vesical and abdominal pressures with no change in detrusor pressure, confirming good study catheter placement.    Uroflow attempted but insufficient volume voided.   Pre-study cathed PVR: 50 mL    DURING THE FILLING PHASE:  First sensation: 192 mL.  First Desire: 277 mL.  Strong Desire: 335 mL.  Maximum Capacity: 402 mL.    Technically complicated study due to erratic pressures from catheters, mostly the Pabd, despite catheters being adjusted multiple times during the study.    Uninhibited detrusor contractions: None.  Compliance: Due to inconsistent pressures from the Pabd catheter, focus placed on Pves pressures. This remains largely stable throughout the study, and therefore compliance appears good.  Continence: Leakage initially after filling begun, but after Pves catheter adjusted, this stopped. No additional leakage throughout the study.  EMG: Concordant during filling.    DURING THE VOIDING PHASE:  He attempts to void on the UDS chair but is unable. He is then transferred to a commode for a second attempt to void but is still unable to void any urine. During these attempts, there is no change in the Pves pressure, suggesting no detrusor contraction. With catheters removed, he is able to void 350 mL into a hat in the toilet, for a final PVR of 52 mL, consistent with pre-study cathed PVR.     FLUOROSCOPIC IMAGING OF THE BLADDER DURING URODYNAMICS:  Please note, image numbers on UDS  tracings correlate with iSite series numbers on PACS images. Fluoroscopy during today's procedure demonstrated a mildly-trabeculated bladder wall without diverticulae or cellules.  No vesicoureteral reflux was observed.  The bladder neck was closed during filling and during initial attempt to void on the UDS chair.  At the completion of the study, all catheters were removed and the patient was brought back into the consultation room to further discuss today's study results.      ASSESSMENT/PLAN:  Mr. Mushtaq Olsen is a pleasant 84 year old male who demonstrated the following findings today on urodynamic evaluation:    -Technically complicated study due to inconsistent pressures from catheters, mostly the Pabd, despite catheters being adjusted multiple times during the study.  -Normal bladder capacity (402 mL) with normal filling sensations.  -Due to inconsistent pressures from the Pabd catheter, focus placed on Pves pressures. This remains largely stable throughout the study, and therefore compliance appears good.  -No rise in Pves pressure during attempt to void, both on the UDS chair and on commode, suggesting no coordinated detrusor contraction.   -With catheters removed, he is able to void 350 mL into a hat in the toilet, for a final PVR of 52 mL, consistent with pre-study cathed PVR.   -Fluoroscopy reveals a mildly-trabeculated bladder wall without diverticulae or cellules.  No vesicoureteral reflux was observed.  The bladder neck was closed during filling and during initial attempt to void on the UDS chair.     The patient will follow up as scheduled with Dr. Carcamo to further discuss today's study results and make plans for how best to proceed.      - A single Bactrim was provided for UTI prophylaxis following completion of today's study per department protocol.  The risk of UTI with VUDS is low at ~2.5-3%.      Thank you for allowing me to participate in the care of Mr. Mushtaq Olsen and please don't  hesitate to contact me with any questions or concerns.      This procedure was performed under a collaborative agreement with Dr. Joseph Del Toro, Professor and  of Urology, [facility].    ALEJANDRINA Ang, CNP  Department of Urology

## 2024-09-30 ENCOUNTER — TRANSFERRED RECORDS (OUTPATIENT)
Dept: HEALTH INFORMATION MANAGEMENT | Facility: CLINIC | Age: 84
End: 2024-09-30

## 2024-09-30 ENCOUNTER — ANCILLARY PROCEDURE (OUTPATIENT)
Dept: RADIOLOGY | Facility: AMBULATORY SURGERY CENTER | Age: 84
End: 2024-09-30
Attending: NURSE PRACTITIONER
Payer: MEDICARE

## 2024-09-30 ENCOUNTER — ALLIED HEALTH/NURSE VISIT (OUTPATIENT)
Dept: UROLOGY | Facility: CLINIC | Age: 84
End: 2024-09-30
Payer: MEDICARE

## 2024-09-30 VITALS — OXYGEN SATURATION: 98 % | HEART RATE: 83 BPM | SYSTOLIC BLOOD PRESSURE: 161 MMHG | DIASTOLIC BLOOD PRESSURE: 87 MMHG

## 2024-09-30 DIAGNOSIS — R33.9 URINARY RETENTION: Primary | ICD-10-CM

## 2024-09-30 DIAGNOSIS — R39.89 URINARY PROBLEM: ICD-10-CM

## 2024-09-30 PROCEDURE — 51728 CYSTOMETROGRAM W/VP: CPT | Performed by: NURSE PRACTITIONER

## 2024-09-30 PROCEDURE — 51784 ANAL/URINARY MUSCLE STUDY: CPT | Performed by: NURSE PRACTITIONER

## 2024-09-30 PROCEDURE — 51797 INTRAABDOMINAL PRESSURE TEST: CPT | Performed by: NURSE PRACTITIONER

## 2024-09-30 PROCEDURE — 51600 INJECTION FOR BLADDER X-RAY: CPT | Performed by: NURSE PRACTITIONER

## 2024-09-30 PROCEDURE — 74455 X-RAY URETHRA/BLADDER: CPT | Performed by: NURSE PRACTITIONER

## 2024-09-30 RX ORDER — SULFAMETHOXAZOLE/TRIMETHOPRIM 800-160 MG
1 TABLET ORAL ONCE
Status: COMPLETED | OUTPATIENT
Start: 2024-09-30 | End: 2024-09-30

## 2024-09-30 RX ADMIN — SULFAMETHOXAZOLE AND TRIMETHOPRIM 1 TABLET: 800; 160 TABLET ORAL at 07:15

## 2024-09-30 NOTE — PATIENT INSTRUCTIONS
UROLOGY CLINIC VISIT PATIENT INSTRUCTIONS    -    If you have any issues, questions or concerns in the meantime, do not hesitate to contact us at [phone] or via [employer].     Kristy Gandara CNP  Department of Urology

## 2024-09-30 NOTE — PROGRESS NOTES
Chief Complaint   Patient presents with    Urodynamics Study       Blood pressure (!) 161/87, pulse 83, SpO2 98%. There is no height or weight on file to calculate BMI.    Patient Active Problem List   Diagnosis    Macular hole of right eye    BPH (benign prostatic hyperplasia)       No Known Allergies    Current Outpatient Medications   Medication Sig Dispense Refill    acetaminophen (TYLENOL) 500 MG tablet Take 500 mg by mouth every 6 hours as needed for mild pain.      allopurinol (ZYLOPRIM) 100 MG tablet Take 2 tablets by mouth daily (2 x 100 mg = 200 mg)      apixaban ANTICOAGULANT (ELIQUIS) 5 MG tablet Take 1 tablet (5 mg) by mouth 2 times daily      atorvastatin (LIPITOR) 20 MG tablet Take 1 tablet by mouth At Bedtime      CHOLECALCIFEROL PO Take 1 tablet by mouth daily OTC uncertain of dose.      dronedarone (MULTAQ) 400 MG TABS tablet Take 1 tablet by mouth daily.      esomeprazole (NEXIUM) 20 MG DR capsule Take 2 capsules by mouth every morning (before breakfast). Take 30-60 minutes before eating.      ciprofloxacin (CIPRO) 500 MG tablet Take 1 tab in the AM and 1 tab in the PM the day before your procedure, day of and day after. (Patient not taking: Reported on 9/30/2024) 6 tablet 0    diclofenac (VOLTAREN) 75 MG EC tablet Take 75 mg by mouth 2 times daily as needed for moderate pain (Patient not taking: Reported on 9/30/2024)      ibuprofen (ADVIL/MOTRIN) 200 MG tablet Take 400 mg by mouth every 6 hours as needed for pain (Patient not taking: Reported on 9/30/2024)      metoprolol succinate ER (TOPROL XL) 25 MG 24 hr tablet Take 1 tablet by mouth daily (Patient not taking: Reported on 9/30/2024)      oxyBUTYnin ER (DITROPAN XL) 10 MG 24 hr tablet Take 1 tablet (10 mg) by mouth daily (Patient not taking: Reported on 9/30/2024) 30 tablet 0    tamsulosin (FLOMAX) 0.4 MG capsule Take 1 capsule (0.4 mg) by mouth daily (Patient not taking: Reported on 9/30/2024) 30 capsule 3    traZODone (DESYREL) 50 MG  tablet Take 50 mg by mouth At Bedtime (Patient not taking: Reported on 2024)         Social History     Tobacco Use    Smoking status: Former     Current packs/day: 0.00     Types: Cigarettes     Quit date:      Years since quittin.7    Smokeless tobacco: Never   Substance Use Topics    Alcohol use: Yes     Comment: Sometimes    Drug use: Not Currently       Invasive Procedure Safety Checklist:    Procedure: Urodynamics    Action: Complete sections and checkboxes as appropriate.  Pre-procedure:  1. Patient ID Verified with 2 identifiers (Brittany and  or MRN) : YES    2. Procedure and site verified with patient/designee (when able) : YES    3. Accurate consent documentation in medical record : YES    4. H&P (or appropriate assessment) documented in medical record : N/A  H&P must be up to 30 days prior to procedure an updated within 24 hours of Procedure as applicable.     5. Relevant diagnostic and radiology test results appropriately labeled and displayed as applicable : YES    6. Blood products, implants, devices, and/or special equipment available for the procedure as applicable : YES    7. Procedure site(s) marked with provider initials [Exclusions: none] : NO    8. Marking not required. Reason : Yes  Procedure does not require site marking    Time Out:     Time-Out performed immediately prior to starting procedure, including verbal and active participation of all team members addressing: YES    1. Correct patient identity.  2. Confirmed that the correct side and site are marked.  3. An accurate procedure to be done.  4. Agreement on the procedure to be done.  5. Correct patient position.  6. Relevant images and results are properly labeled and appropriately displayed.  7. The need to administer antibiotics or fluids for irrigation purposes during the procedure as applicable.  8. Safety precautions based on patient history or medication use.    During Procedure: Verification of correct person, site,  and procedure occurs any time the responsibility for care of the patient is transferred to another member of the care team.      The following medication was given:     MEDICATION: Bactrim (Trimethoprim / Sulfamethoxazole)  ROUTE: PO  SITE: Medication was given orally  DOSE: 800mg/160mg  LOT #: [id number]  : [employer]   EXPIRATION DATE: 01/31/2026  NDC#: [id number]   Was there drug waste? No    Prior to medication administration, verified patient identity using patient's name and date of birth.  Due to medication administration, patient instructed to remain in clinic for 15 minutes  afterwards, and to report any adverse reaction to me immediately.   Prior to administration, verified patient identity using patient's name and date of birth.  Due to administration, patient instructed to remain in clinic for 15 minutes  afterwards, and to report any adverse reaction to me immediately.    Drug Amount Wasted:  None.  Vial/Syringe: Single dose vial    The following medication was given:     MEDICATION:  Omnipaque (Iohexol Injection) (240mgI/mL)  ROUTE: Provider Administered  SITE: Provider Administered via catheter  DOSE: 100mL  LOT #: [id number]  : [employer]  EXPIRATION DATE: 04/23/2026  NDC#: [id number]   Was there drug waste? No    Prior to injection, verified patient identity using patient's name and date of birth.  Due to injection administration, patient instructed to remain in clinic for 15 minutes  afterwards, and to report any adverse reaction to me immediately.    Drug Amount Wasted:  None.  Vial/Syringe: Single dose vial      The following medication was given: See Above      Insertion:  14 Fr straight tipped silicone straight catheter inserted into urethral meatus in the usual sterile fashion without difficulty.  Received 50 ml yellow urine output.     Patient did tolerate procedure well.    Patient instructed as to where to call or go for pain, fever, leakage, or  decreased urine flow.     This service provided today was under the direct supervision of Kristy Gandara CNP, who was available if needed.    Deepika Og RN  9/30/2024  7:16 AM

## 2024-10-02 ENCOUNTER — OFFICE VISIT (OUTPATIENT)
Dept: UROLOGY | Facility: CLINIC | Age: 84
End: 2024-10-02
Payer: MEDICARE

## 2024-10-02 ENCOUNTER — TELEPHONE (OUTPATIENT)
Dept: UROLOGY | Facility: CLINIC | Age: 84
End: 2024-10-02

## 2024-10-02 VITALS — HEART RATE: 74 BPM | OXYGEN SATURATION: 98 % | DIASTOLIC BLOOD PRESSURE: 83 MMHG | SYSTOLIC BLOOD PRESSURE: 123 MMHG

## 2024-10-02 DIAGNOSIS — C61 PROSTATE CANCER (H): Primary | ICD-10-CM

## 2024-10-02 PROCEDURE — 99214 OFFICE O/P EST MOD 30 MIN: CPT | Performed by: UROLOGY

## 2024-10-02 ASSESSMENT — PAIN SCALES - GENERAL: PAINLEVEL: NO PAIN (0)

## 2024-10-02 NOTE — LETTER
"10/2/2024       RE: Mushtaq Olsen  [address] [place]  [place] [facility] [address]     Dear Colleague,    Thank you for referring your patient, Mushtaq Olsen, to the [facility] [place] at [facility]. Please see a copy of my visit note below.    Urology Clinic     HPI  Mushtaq Olsen is a 84 year old male with newly diagnosed prostate cancer, here for follow-up after his prostate biopsy.      The patient denies any major issues after the biopsy     No changes to health, hospitalizations or new diagnoses in the interim    PHYSICAL EXAM  Vitals:  No vitals were obtained today due to virtual visit.    Physical Exam   GENERAL: Healthy, alert and no distress  EYES: Eyes grossly normal to inspection.  No discharge or erythema, or obvious scleral/conjunctival abnormalities.  RESP: No audible wheeze, cough, or visible cyanosis.  No visible retractions or increased work of breathing.    SKIN: Visible skin clear. No significant rash, abnormal pigmentation or lesions.  NEURO: Cranial nerves grossly intact.  Mentation and speech appropriate for age.  PSYCH: Mentation appears normal, affect normal/bright, judgement and insight intact, normal speech and appearance well-groomed.      Labs  Lab Results   Component Value Date    WBC 14.4 05/19/2023     Lab Results   Component Value Date    RBC 4.05 05/19/2023     Lab Results   Component Value Date    HGB 12.2 05/19/2023     Lab Results   Component Value Date    HCT 38.0 05/19/2023     No components found for: \"MCT\"  Lab Results   Component Value Date    MCV 94 05/19/2023     Lab Results   Component Value Date    MCH 30.1 05/19/2023     Lab Results   Component Value Date    MCHC 32.1 05/19/2023     Lab Results   Component Value Date    RDW 14.7 05/19/2023     Lab Results   Component Value Date     05/19/2023        Last Comprehensive Metabolic Panel:  Sodium   Date Value Ref Range Status   05/19/2023 138 " 136 - 145 mmol/L Final     Potassium   Date Value Ref Range Status   05/19/2023 4.0 3.4 - 5.3 mmol/L Final     Chloride   Date Value Ref Range Status   05/19/2023 106 98 - 107 mmol/L Final     Carbon Dioxide (CO2)   Date Value Ref Range Status   05/19/2023 21 (L) 22 - 29 mmol/L Final     Anion Gap   Date Value Ref Range Status   05/19/2023 11 7 - 15 mmol/L Final     Glucose   Date Value Ref Range Status   05/19/2023 128 (H) 70 - 99 mg/dL Final     GLUCOSE BY METER POCT   Date Value Ref Range Status   05/18/2023 101 (H) 70 - 99 mg/dL Final     Urea Nitrogen   Date Value Ref Range Status   05/19/2023 24.6 (H) 8.0 - 23.0 mg/dL Final     Creatinine   Date Value Ref Range Status   05/19/2023 0.89 0.67 - 1.17 mg/dL Final     GFR Estimate   Date Value Ref Range Status   05/19/2023 86 >60 mL/min/1.73m2 Final     Comment:     eGFR calculated using 2021 CKD-EPI equation.     Calcium   Date Value Ref Range Status   05/19/2023 9.3 8.8 - 10.2 mg/dL Final       PSA Tumor Marker   Date Value Ref Range Status   08/01/2024 5.36 ng/mL Final     Comment:     No reference ranges have been established for patients over 80 years.        Surgical pathology  Final Diagnosis   A.  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  Prostate, left mid, biopsy-  Adenocarcinoma, P[place] grade 4/4 in 2 of 2 needle core biopsies and 30% of submitted tissue  Perineural invasion and focally suspicious for vascular invasion identified     C.  Prostate, left apex, biopsy-  Adenocarcinoma, Piasa's grade 4/4 involving 2 of 2 needle core biopsies and 20% of submitted tissue     D.  Prostate, right base, biopsy-  Adenocarcinoma, Wiliam grade 4/5 involving 1 of 2 needle core biopsies and 15% of submitted tissue     E.  Prostate, right mid, biopsy-  Adenocarcinoma, Piasa's grade 4/5 involving 1 of 2 needle core biopsies and 20% submitted tissue     F.  Prostate,  right apex, biopsy-  Benign prostate tissue with chronic inflammatory change     G.  Prostate, target, not otherwise specified, biopsy-  Adenocarcinoma, Wiliam grade 4/5 involving 2 of 2 needle core biopsies and 70% of submitted tissue   Electronically signed by Timothy Ochoa MD on 9/17/2024 at  1:19 PM     Imaging   MR prostate 7/9/2024    FINDINGS: Study is limited by artifact from right hip arthroplasty,  particularly on diffusion-weighted sequences.  Size: 43 grams  Hemorrhage: Absent  Peripheral zone: Heterogeneous on T2-weighted images. Suspicious  lesions as detailed below.  Transition zone: Changes of prior TURP. Transition zone nodules which  are circumscribed or mostly encapsulated without diffusion  restriction.  PI-RADS 2.  No highly suspicious nodules.     Lesion(s) in rank order of severity (highest score- to lowest score,  then by size)      Lesion 1:  Location: Left mid gland peripheral zone at the 5 o'clock position  relative to the urethra. Series 4 image 57 and series 11 image 43.  Size: 10 mm  T2 description: Ill-defined hypointense  T2 numerical assessment: 3  DWI description: Focal diffusion restriction  DWI numerical assessment: 3  DCE assessment: Positive    Prostate margin: Capsular abutment 6-15 mm  Lesion overall PI-RADS category: 4     Neurovascular bundles: No neurovascular bundle involvement by  malignancy.  Seminal vesicles: No seminal vesicle involvement by malignancy.  Lymph nodes: No lymph node involvement  Bones: No suspicious lesions  Other pelvic organs: Right hip arthroplasty. Fat-containing right  inguinal hernia.                                                                         IMPRESSION:  1. Based on the most suspicious abnormality, this exam is  characterized as PIRADS 4 - Clinically significant cancer is likely to  be present.  The most suspicious abnormality is located at the left  mid gland peripheral zone and there is short segment capsular  abutment  with low likelihood of minimal extraprostatic extension.  2. No suspicious adenopathy or evidence of pelvic metastases.    ASSESSMENT AND PLAN  84 year old male with recently diagnosed high risk presumable localized prostate cancer    We discussed the importance of high risk prostate cancer and the need for staging imaging which is scheduled for tomorrow.  We discussed different treatment options for localized high risk prostate cancer including multimodal treatment with upfront surgery or radiation and ADT.  Pros and cons of each approach were discussed.  My only concern about surgical treatment in him is his age and functional recovery from the surgery in terms of urinary incontinence.  I will give him a call with the results of PET PSMA scan.      He was hoping to have a follow-up appointment with Dr. Carcamo to discuss urodynamic study findings.      Plan   Call the patient with PET PSMA results  Will schedule follow-up appointment with Dr. Carcamo for him     30 total minutes spent on the date of the encounter including direct interaction with the patient, performing chart review, documentation and further activities as noted above    Sandy Kumari MD   Department of Urology   [facility]                 Again, thank you for allowing me to participate in the care of your patient.      Sincerely,    Sandy Kumari MD

## 2024-10-02 NOTE — PROGRESS NOTES
"Urology Clinic     HPI  Mushtaq Olsen is a 84 year old male with newly diagnosed prostate cancer, here for follow-up after his prostate biopsy.      The patient denies any major issues after the biopsy     No changes to health, hospitalizations or new diagnoses in the interim    PHYSICAL EXAM  Vitals:  No vitals were obtained today due to virtual visit.    Physical Exam   GENERAL: Healthy, alert and no distress  EYES: Eyes grossly normal to inspection.  No discharge or erythema, or obvious scleral/conjunctival abnormalities.  RESP: No audible wheeze, cough, or visible cyanosis.  No visible retractions or increased work of breathing.    SKIN: Visible skin clear. No significant rash, abnormal pigmentation or lesions.  NEURO: Cranial nerves grossly intact.  Mentation and speech appropriate for age.  PSYCH: Mentation appears normal, affect normal/bright, judgement and insight intact, normal speech and appearance well-groomed.      Labs  Lab Results   Component Value Date    WBC 14.4 05/19/2023     Lab Results   Component Value Date    RBC 4.05 05/19/2023     Lab Results   Component Value Date    HGB 12.2 05/19/2023     Lab Results   Component Value Date    HCT 38.0 05/19/2023     No components found for: \"MCT\"  Lab Results   Component Value Date    MCV 94 05/19/2023     Lab Results   Component Value Date    MCH 30.1 05/19/2023     Lab Results   Component Value Date    MCHC 32.1 05/19/2023     Lab Results   Component Value Date    RDW 14.7 05/19/2023     Lab Results   Component Value Date     05/19/2023        Last Comprehensive Metabolic Panel:  Sodium   Date Value Ref Range Status   05/19/2023 138 136 - 145 mmol/L Final     Potassium   Date Value Ref Range Status   05/19/2023 4.0 3.4 - 5.3 mmol/L Final     Chloride   Date Value Ref Range Status   05/19/2023 106 98 - 107 mmol/L Final     Carbon Dioxide (CO2)   Date Value Ref Range Status   05/19/2023 21 (L) 22 - 29 mmol/L Final     Anion Gap   Date Value Ref " Range Status   05/19/2023 11 7 - 15 mmol/L Final     Glucose   Date Value Ref Range Status   05/19/2023 128 (H) 70 - 99 mg/dL Final     GLUCOSE BY METER POCT   Date Value Ref Range Status   05/18/2023 101 (H) 70 - 99 mg/dL Final     Urea Nitrogen   Date Value Ref Range Status   05/19/2023 24.6 (H) 8.0 - 23.0 mg/dL Final     Creatinine   Date Value Ref Range Status   05/19/2023 0.89 0.67 - 1.17 mg/dL Final     GFR Estimate   Date Value Ref Range Status   05/19/2023 86 >60 mL/min/1.73m2 Final     Comment:     eGFR calculated using 2021 CKD-EPI equation.     Calcium   Date Value Ref Range Status   05/19/2023 9.3 8.8 - 10.2 mg/dL Final       PSA Tumor Marker   Date Value Ref Range Status   08/01/2024 5.36 ng/mL Final     Comment:     No reference ranges have been established for patients over 80 years.        Surgical pathology  Final Diagnosis   A.  Prostate, left base, biopsy-  Adenocarcinoma, [place]'s grade 4/4 involving 1 of 2 needle core biopsies and 30% of submitted tissue     B.  Prostate, left mid, biopsy-  Adenocarcinoma, [place] grade 4/4 in 2 of 2 needle core biopsies and 30% of submitted tissue  Perineural invasion and focally suspicious for vascular invasion identified     C.  Prostate, left apex, biopsy-  Adenocarcinoma, [place]'s grade 4/4 involving 2 of 2 needle core biopsies and 20% of submitted tissue     D.  Prostate, right base, biopsy-  Adenocarcinoma, [place] grade 4/5 involving 1 of 2 needle core biopsies and 15% of submitted tissue     E.  Prostate, right mid, biopsy-  Adenocarcinoma, [place]'s grade 4/5 involving 1 of 2 needle core biopsies and 20% submitted tissue     F.  Prostate, right apex, biopsy-  Benign prostate tissue with chronic inflammatory change     G.  Prostate, target, not otherwise specified, biopsy-  Adenocarcinoma, [place] grade 4/5 involving 2 of 2 needle core biopsies and 70% of submitted tissue   Electronically signed by Timothy Ochoa MD on 9/17/2024 at  1:19  PM     Imaging   MR prostate 7/9/2024    FINDINGS: Study is limited by artifact from right hip arthroplasty,  particularly on diffusion-weighted sequences.  Size: 43 grams  Hemorrhage: Absent  Peripheral zone: Heterogeneous on T2-weighted images. Suspicious  lesions as detailed below.  Transition zone: Changes of prior TURP. Transition zone nodules which  are circumscribed or mostly encapsulated without diffusion  restriction.  PI-RADS 2.  No highly suspicious nodules.     Lesion(s) in rank order of severity (highest score- to lowest score,  then by size)      Lesion 1:  Location: Left mid gland peripheral zone at the 5 o'clock position  relative to the urethra. Series 4 image 57 and series 11 image 43.  Size: 10 mm  T2 description: Ill-defined hypointense  T2 numerical assessment: 3  DWI description: Focal diffusion restriction  DWI numerical assessment: 3  DCE assessment: Positive    Prostate margin: Capsular abutment 6-15 mm  Lesion overall PI-RADS category: 4     Neurovascular bundles: No neurovascular bundle involvement by  malignancy.  Seminal vesicles: No seminal vesicle involvement by malignancy.  Lymph nodes: No lymph node involvement  Bones: No suspicious lesions  Other pelvic organs: Right hip arthroplasty. Fat-containing right  inguinal hernia.                                                                         IMPRESSION:  1. Based on the most suspicious abnormality, this exam is  characterized as PIRADS 4 - Clinically significant cancer is likely to  be present.  The most suspicious abnormality is located at the left  mid gland peripheral zone and there is short segment capsular abutment  with low likelihood of minimal extraprostatic extension.  2. No suspicious adenopathy or evidence of pelvic metastases.    ASSESSMENT AND PLAN  84 year old male with recently diagnosed high risk presumable localized prostate cancer    We discussed the importance of high risk prostate cancer and the need for  staging imaging which is scheduled for tomorrow.  We discussed different treatment options for localized high risk prostate cancer including multimodal treatment with upfront surgery or radiation and ADT.  Pros and cons of each approach were discussed.  My only concern about surgical treatment in him is his age and functional recovery from the surgery in terms of urinary incontinence.  I will give him a call with the results of PET PSMA scan.      He was hoping to have a follow-up appointment with Dr. Carcamo to discuss urodynamic study findings.      Plan   Call the patient with PET PSMA results  Will schedule follow-up appointment with Dr. Carcamo for him     30 total minutes spent on the date of the encounter including direct interaction with the patient, performing chart review, documentation and further activities as noted above    Sandy Kumari MD   Department of Urology   [facility]

## 2024-10-02 NOTE — NURSING NOTE
Mushtaq Olsen is a 84 year old male patient that presents today in clinic for the following:    Chief Complaint   Patient presents with    Follow Up     Prostate biopsy results        The patient's allergies and medications were reviewed as noted. A set of vitals were recorded as noted without incident. The patient does not have any other questions for the provider.    Blood pressure 123/83, pulse 74, SpO2 98%. There is no height or weight on file to calculate BMI.    Patient Active Problem List   Diagnosis    Macular hole of right eye    BPH (benign prostatic hyperplasia)       No Known Allergies    Current Outpatient Medications   Medication Sig Dispense Refill    acetaminophen (TYLENOL) 500 MG tablet Take 500 mg by mouth every 6 hours as needed for mild pain.      allopurinol (ZYLOPRIM) 100 MG tablet Take 2 tablets by mouth daily (2 x 100 mg = 200 mg)      apixaban ANTICOAGULANT (ELIQUIS) 5 MG tablet Take 1 tablet (5 mg) by mouth 2 times daily      atorvastatin (LIPITOR) 20 MG tablet Take 1 tablet by mouth At Bedtime      CHOLECALCIFEROL PO Take 1 tablet by mouth daily OTC uncertain of dose.      dronedarone (MULTAQ) 400 MG TABS tablet Take 1 tablet by mouth daily.      esomeprazole (NEXIUM) 20 MG DR capsule Take 2 capsules by mouth every morning (before breakfast). Take 30-60 minutes before eating.      metoprolol succinate ER (TOPROL XL) 25 MG 24 hr tablet Take 1 tablet by mouth daily.      ciprofloxacin (CIPRO) 500 MG tablet Take 1 tab in the AM and 1 tab in the PM the day before your procedure, day of and day after. (Patient not taking: Reported on 10/2/2024) 6 tablet 0    diclofenac (VOLTAREN) 75 MG EC tablet Take 75 mg by mouth 2 times daily as needed for moderate pain (Patient not taking: Reported on 9/30/2024)      ibuprofen (ADVIL/MOTRIN) 200 MG tablet Take 400 mg by mouth every 6 hours as needed for pain (Patient not taking: Reported on 9/30/2024)      oxyBUTYnin ER (DITROPAN XL) 10 MG 24 hr tablet  Take 1 tablet (10 mg) by mouth daily (Patient not taking: Reported on 2024) 30 tablet 0    tamsulosin (FLOMAX) 0.4 MG capsule Take 1 capsule (0.4 mg) by mouth daily (Patient not taking: Reported on 2024) 30 capsule 3    traZODone (DESYREL) 50 MG tablet Take 50 mg by mouth At Bedtime (Patient not taking: Reported on 2024)         Social History     Tobacco Use    Smoking status: Former     Current packs/day: 0.00     Types: Cigarettes     Quit date:      Years since quittin.7    Smokeless tobacco: Never   Substance Use Topics    Alcohol use: Yes     Comment: Sometimes    Drug use: Not Currently       Tatum Bullock LPN  10/2/2024  10:25 AM

## 2024-10-02 NOTE — TELEPHONE ENCOUNTER
Patient confirmed scheduled appointment:  Date: 10/8  Time: 9:45  Visit type: RTN  Provider: Carlos Alberto  Location: Virtual  Testing/imaging: NA  Additional notes: Go over dalila perry

## 2024-10-03 ENCOUNTER — HOSPITAL ENCOUNTER (OUTPATIENT)
Dept: PET IMAGING | Facility: CLINIC | Age: 84
Setting detail: NUCLEAR MEDICINE
Discharge: HOME | End: 2024-10-03
Attending: UROLOGY | Admitting: UROLOGY
Payer: MEDICARE

## 2024-10-03 DIAGNOSIS — C61 PROSTATE CANCER (H): ICD-10-CM

## 2024-10-03 LAB — RADIOLOGIST FLAGS: NORMAL

## 2024-10-03 PROCEDURE — G1010 CDSM STANSON: HCPCS | Performed by: RADIOLOGY

## 2024-10-03 PROCEDURE — G1010 CDSM STANSON: HCPCS | Mod: PS

## 2024-10-03 PROCEDURE — 999N000248 HC STATISTIC IV INSERT WITH US BY RN

## 2024-10-03 PROCEDURE — 78815 PET IMAGE W/CT SKULL-THIGH: CPT | Mod: 26 | Performed by: RADIOLOGY

## 2024-10-03 PROCEDURE — A9596 HC RX 343 MED OP 636: HCPCS | Performed by: UROLOGY

## 2024-10-03 PROCEDURE — 343N000001 HC RX 343 MED OP 636: Performed by: UROLOGY

## 2024-10-03 RX ADMIN — KIT FOR THE PREPARATION OF GALLIUM GA 68 GOZETOTIDE INJECTION 5.69 MILLICURIE: KIT INTRAVENOUS at 13:04

## 2024-10-04 ENCOUNTER — DOCUMENTATION ONLY (OUTPATIENT)
Dept: UROLOGY | Facility: CLINIC | Age: 84
End: 2024-10-04

## 2024-10-04 DIAGNOSIS — C61 PROSTATE CANCER (H): Primary | ICD-10-CM

## 2024-10-04 NOTE — NURSING NOTE
Call received from imaging to report incidental findings:       Diffuse gastric wall thickening, incompletely characterized though  not significantly changed compared to prior outside exam, nonspecific.  If not recently performed, upper endoscopy for further catheterization  is recommended.     Writer will send staff message to update DR Kumari    Thank you,  Mari Toth,BSN RN-Triage-Urology

## 2024-10-05 ENCOUNTER — PATIENT OUTREACH (OUTPATIENT)
Dept: ONCOLOGY | Facility: CLINIC | Age: 84
End: 2024-10-05
Payer: MEDICARE

## 2024-10-05 NOTE — PROGRESS NOTES
New Patient Radiation Oncology Nurse Navigator Note     Referring provider:   Referred By    Provider Department Location Phone   Sandy Kumari MD [facility] [phone]        Referred to (specialty): Radiation Oncology    Requested provider (if applicable):   [facility]: [phone]     Date Referral Received:   10/4/24     Evaluation for :   High risk prostate cancer negative metastatic workup     Clinical History (per Nurse review of records provided):    Patient of Dr. Kumari, urology, with newly diagnosed prostate cancer.  Prostate biopsy 9/13/24 [place] grade 4/5.     Combined Report of: PET and CT on 10/3/2024 2:20 PM:  IMPRESSION:  In this 84-year-old with recently diagnosed prostate adenocarcinoma:     1.  Focal PSMA receptor expression in the left peripheral zone consistent with biopsy proven prostate adenocarcinoma. Additional focal uptake in the right peripheral zone suspicious for malignancy.   2.  Alisha disease: No PSMA receptor expression in the pelvic or retroperitoneal lymph nodes. No suspicious uptake in distant lymph nodes.  3.  Bones: No PSMA avid osseous lesions.  4.  Diffuse gastric wall thickening, incompletely characterized though not significantly changed compared to prior outside exam, nonspecific. If not recently performed, upper endoscopy for further catheterization is recommended.   5.  Additional ancillary findings as described in the body of report.          Records Location (Care Everywhere, Media, etc.):   Epic     Previous radiation treatment: ???       Tentative plan:    SCHEDULE: Rad onc for prostate cancer @ pt choice of location (likely [facility]) NEW, in person, within 2 weeks    10/7/24  I called Mushtaq to discuss referral to radiation oncology, he had me speak with his wife.  They are choosing to move forward with surgery at this time and do not wish to schedule a rad onc consult.  I let Dr. Kumari know  this and I will cancel referral.      Marietta Skelton, RN, BSN  Oncology New Patient Nurse Navigator   [facility]  [phone]

## 2024-10-07 ENCOUNTER — TELEPHONE (OUTPATIENT)
Dept: UROLOGY | Facility: CLINIC | Age: 84
End: 2024-10-07
Payer: MEDICARE

## 2024-10-07 NOTE — TELEPHONE ENCOUNTER
Spouse called to let writer know they would like to go forward with surgery. Let Dr. Kumari know. Let them know it can take a few days to hear back to get surgery scheduled.    Chantel Yates RN, BSN  RN[facility] Urology    
supplemental O2

## 2024-10-08 ENCOUNTER — VIRTUAL VISIT (OUTPATIENT)
Dept: UROLOGY | Facility: CLINIC | Age: 84
End: 2024-10-08
Payer: MEDICARE

## 2024-10-08 DIAGNOSIS — C61 PROSTATE CANCER (H): Primary | ICD-10-CM

## 2024-10-08 PROCEDURE — 99213 OFFICE O/P EST LOW 20 MIN: CPT | Mod: 95 | Performed by: UROLOGY

## 2024-10-08 NOTE — PROGRESS NOTES
UROLOGY OUTPATIENT VISIT      Chief Complaint:   Prostate cancer      Synopsis    Mushtaq Olsen is a very pleasant AGE: 84 year old year old person    He has a history of slow urinary stream.  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  We had worked him up with cystoscopy that was unremarkable.  Digital rectal exam however was abnormal and we updated prostate MRI showing left-sided concerning lesions.  He recently underwent a biopsy which confirmed high risk prostate cancer.  A staging PSMA PET scan did not reveal any evidence of metastatic disease.  He has met with my colleague Dr. Kumari and is in consideration for radical prostatectomy.    Follow-up of his slow urinary stream we had initially recommended he try intermittent catheterization especially before bedtime.  He has not tried this at home as he finds it uncomfortable and prefers to avoid catheterization.  He still continues to wake up 2-3 times at night and has a slow urinary stream but says that this is tolerable.    He underwent urodynamics the results of which I personally reviewed.  He does not appear to have much of any detrusor contraction.  I suspect he is mainly voiding with some assistance with Valsalva maneuvering.  Urodynamics tracing is somewhat erratic.  Ultimately his postvoid residual is low    -Technically complicated study due to inconsistent pressures from catheters, mostly the Pabd, despite catheters being adjusted multiple times during the study.  -Normal bladder capacity (402 mL) with normal filling sensations.  -Due to inconsistent pressures from the Pabd catheter, focus placed on Pves pressures. This remains largely stable throughout the study, and therefore compliance appears good.  -No rise in Pves pressure during attempt to void, both on the UDS chair and on commode, suggesting no coordinated detrusor contraction.   -With catheters removed, he is able to void 350 mL into a hat in the toilet, for a final PVR of 52 mL,  consistent with pre-study cathed PVR.   -Fluoroscopy reveals a mildly-trabeculated bladder wall without diverticulae or cellules.  No vesicoureteral reflux was observed.  The bladder neck was closed during filling and during initial attempt to void on the UDS chair.         Medications     Current Outpatient Medications   Medication Sig Dispense Refill    acetaminophen (TYLENOL) 500 MG tablet Take 500 mg by mouth every 6 hours as needed for mild pain.      allopurinol (ZYLOPRIM) 100 MG tablet Take 2 tablets by mouth daily (2 x 100 mg = 200 mg)      apixaban ANTICOAGULANT (ELIQUIS) 5 MG tablet Take 1 tablet (5 mg) by mouth 2 times daily      atorvastatin (LIPITOR) 20 MG tablet Take 1 tablet by mouth At Bedtime      CHOLECALCIFEROL PO Take 1 tablet by mouth daily OTC uncertain of dose.      ciprofloxacin (CIPRO) 500 MG tablet Take 1 tab in the AM and 1 tab in the PM the day before your procedure, day of and day after. (Patient not taking: Reported on 10/2/2024) 6 tablet 0    diclofenac (VOLTAREN) 75 MG EC tablet Take 75 mg by mouth 2 times daily as needed for moderate pain (Patient not taking: Reported on 9/30/2024)      dronedarone (MULTAQ) 400 MG TABS tablet Take 1 tablet by mouth daily.      esomeprazole (NEXIUM) 20 MG DR capsule Take 2 capsules by mouth every morning (before breakfast). Take 30-60 minutes before eating.      ibuprofen (ADVIL/MOTRIN) 200 MG tablet Take 400 mg by mouth every 6 hours as needed for pain (Patient not taking: Reported on 9/30/2024)      metoprolol succinate ER (TOPROL XL) 25 MG 24 hr tablet Take 1 tablet by mouth daily.      oxyBUTYnin ER (DITROPAN XL) 10 MG 24 hr tablet Take 1 tablet (10 mg) by mouth daily (Patient not taking: Reported on 9/30/2024) 30 tablet 0    tamsulosin (FLOMAX) 0.4 MG capsule Take 1 capsule (0.4 mg) by mouth daily (Patient not taking: Reported on 9/30/2024) 30 capsule 3    traZODone (DESYREL) 50 MG tablet Take 50 mg by mouth At Bedtime (Patient not taking:  Reported on 9/30/2024)       No current facility-administered medications for this visit.         The following  distinct labs were reviewed    I personally reviewed all applicable laboratory data and went over findings with patient  Significant for:    CBC RESULTS:  Recent Labs   Lab Test 05/19/23 0526 05/18/23 1217 05/11/23  0933   WBC 14.4* 8.0 6.1   HGB 12.2* 12.6* 12.7*    157 153        BMP RESULTS:  Recent Labs   Lab Test 05/19/23 0526 05/18/23 1217 05/18/23  0845 05/11/23  0933    140  --  141   POTASSIUM 4.0 4.4  --  4.5   CHLORIDE 106 108*  --  105   CO2 21* 22  --  26   ANIONGAP 11 10  --  10   * 129* 101* 94   BUN 24.6* 25.9*  --  30.5*   CR 0.89 1.03  --  1.23*   GFRESTIMATED 86 73  --  59*       CALCIUM RESULTS:  Recent Labs   Lab Test 05/19/23 0526 05/18/23 1217 05/11/23  0933   VA 9.3 9.5 9.6         UA RESULTS:   Recent Labs   Lab Test 09/24/24  1052 06/25/24  1419   SG 1.023 1.023   URINEPH 5.5 5.5   NITRITE Negative Negative   RBCU 4*  --    WBCU 3  --        PSA RESULTS  PSA Tumor Marker   Date Value Ref Range Status   08/01/2024 5.36 ng/mL Final     Comment:     No reference ranges have been established for patients over 80 years.         Recent Imaging Report    I personally reviewed all applicable imaging and went over the below findings with patient.    Results for orders placed or performed during the hospital encounter of 10/03/24   PET PSMA Eyes to Thighs     Value    Radiologist flags Diffuse gastric wall thickening, incompletely    Narrative    Combined Report of: PET and CT on 10/3/2024 2:20 PM:    1. PET of the neck, chest, abdomen, and pelvis.  2. PET CT Fusion for Attenuation Correction and Anatomical  Localization:    3. 3D MIP and PET-CT fused images were processed on an independent  workstation and archived to PACS and reviewed by a radiologist.    Technique:    1. PET: The patient received 5.7 mCi of Ga-68 PSMA, body weight was 95  kg. Images were  evaluated in the axial, sagittal, and coronal planes  as well as the rotational whole body MIP. Images were acquired from  the Vertex to the Proximal Thighs.    UPTAKE WAS MEASURED AT 60 MINUTES.     2. CT: CT only obtained for attenuation correction and not diagnostic  purposes.    INDICATION: Prostate cancer (H)    ADDITIONAL INFORMATION OBTAINED FROM EMR: 84-year-old elevated  prostate-specific antigen, and prior MRI 7/29/2024 which demonstrated  a suspicious abnormality in the left mid peripheral zone with short  segment capsular abutment, and the likelihood of minimal  extraprostatic extension. Biopsy-proven prostate adenocarcinoma in the  area of concern. Initial staging PET.    MOST RECENT PSA: 5.36 ng/mL    COMPARISON: None.    FINDINGS:     Liver SUV mean = 5.4, Aorta SUV mean = 1.7,  Parotid SUV mean = 11.7     PROSTATE GLAND:  Prostate gland is enlarged measuring 6.0 x 4.3 cm (series 4, image  69), and demonstrates focal PSMA receptor expression along the left  mid peripheral zone, with a maximum SUV of 7.3. Additional small focus  of uptake in the right lateral peripheral zone with a max SUV of 7.4.  Linear intense avidity within the prostate likely corresponds to TURP  defect, better characterized on prior prostate MRI.    LYMPH NODES:  No suspicious uptake in the pelvic and retroperitoneal lymph nodes.    No suspicious uptake in other lymph node regions or concerning  lymphadenopathy.    BONES:   No PSMA avid osseous lesions.     Total right hip arthroplasty, hardware appears intact..    OTHER FINDINGS:  Head/Neck:  Brain is grossly unremarkable given lack of IV contrast and low dose  CT examination. Paranasal sinuses and mastoid air cells are clear.  Vessels are grossly unremarkable given lack of IV contrast. Thyroid is  without dominant nodule or abnormal avidity. No definite adenopathy.    Chest:  No suspicious uptake in the chest.    Implantable cardiac defibrillator in the left chest wall  with  appropriate lead positioning. Significant coronary artery  calcifications, with artifact suggesting prior stent placement.  Heart and great vessels are otherwise grossly unremarkable double of  IV contrast.    Respiratory motion artifact limits evaluation of the lung parenchyma.  Bibasilar atelectasis. Groundglass opacities in the right medial  posterior lower lobe (series 4, image 262), with mild uptake may  represent infectious or inflammatory process.    Abdomen/Pelvis:  Evaluation of the solid abdominal and pelvic organs is limited due to  lack of IV contrast.    No focal lesion identified within the hepatic parenchyma given the  limitations of this noncontrast examination. Physiologic increased  uptake within the hepatic and splenic parenchyma.    Atherosclerotic calcifications extending along the infrarenal  abdominal aorta to the common iliac and distal arteries. Small hiatal  hernia. Lipomatous atrophy of the pancreas. Small right-sided  hydrocele.    Diffuse gastric wall thickening, incompletely characterized on this  examination given lack of IV contrast, without radiotracer uptake.  Similar thickening noted on prior outside CT dated 4/20/2023.      Impression    IMPRESSION:  In this 84-year-old with recently diagnosed prostate adenocarcinoma:    1.  Focal PSMA receptor expression in the left peripheral zone  consistent with biopsy proven prostate adenocarcinoma. Additional  focal uptake in the right peripheral zone suspicious for malignancy.   2.  Laisha disease: No PSMA receptor expression in the pelvic or  retroperitoneal lymph nodes. No suspicious uptake in distant lymph  nodes.  3.  Bones: No PSMA avid osseous lesions.  4.  Diffuse gastric wall thickening, incompletely characterized though  not significantly changed compared to prior outside exam, nonspecific.  If not recently performed, upper endoscopy for further catheterization  is recommended.   5.  Additional ancillary findings as described  in the body of report.     [Recommend Follow Up: Diffuse gastric wall thickening, incompletely  characterized though not significantly changed compared to prior  outside exam, nonspecific. If not recently performed, upper endoscopy  for further catheterization is recommended]    This report will be copied to the [facility] to ensure a  provider acknowledges the finding. Access Center is available Monday  through Friday 8am-3:30 pm.      Average of SUV means of lesions in the body is 4.6       ----------------------------------------------------------------------  --------------------------------------------  Please score using the miPSMA Expression Score  Score 0: Uptake below blood pool; No PSMA expression  Score 1: Uptake blood pool equal to or above blood pool and lower than  liver* (spleen for F-18); Low PSMA expression  Score 2: Uptake equal to or above liver* (spleen for F-18) and lower  than parotid; Intermediate PSMA expression  Score 3: Uptake equal to or above parotid; High PSMA expression    Source - Hong yap al. Prostate Cancer Molecular Imaging Standardized  Evaluation (PROMISE): Proposed [facility] Classification for the  Interpretation of PSMA-Ligand PET/CT. J Nucl Med 2018.  ----------------------------------------------------------------------  --------------------------------------------  Reference - PSA level and corresponding PSMA sensitivity    0.2-0.49    33%  0.5-0.99    45%  1.0-1.99    75%  >2.00        95%  Source - Chandler yap al. Gallium-68 Prostate-specific Membrane Antigen  Positron Emission Tomography in Advanced Prostate Cancer?Updated  Diagnostic Utility, Sensitivity, Specificity, and Distribution of  Prostate-specific Membrane Antigen-avid Lesions: A Systematic Review  and Stewardson-analysis.  Urology April 2020.  ----------------------------------------------------------------------  --------------------------------------------    I have personally reviewed the examination  and initial interpretation  and I agree with the findings.    FLACO CAREY MD         SYSTEM ID:  [id number]              Assessment/Plan   84 year old year old person with history of HoLEP, prostate cancer, detrusor hypocontractility  -We had a brief discussion regarding management options of his high risk prostate cancer.  I will ultimately defer decision making to the patient as well as Dr. Kumari.  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  At the moment he appears to be emptying adequately.  Further reduction of prostate size through prostatectomy and surgical removal of gland could help facilitate long-term bladder emptying though there is a chance that he could ultimately require intermittent catheterization or suprapubic tube.      CC:  Saurabh Fuller

## 2024-10-08 NOTE — LETTER
10/8/2024       RE: Mushtaq Olsen  [id number]  [place] [address]     Dear Colleague,    Thank you for referring your patient, Mushtaq Olsen, to the [facility] [place] at [facility]. Please see a copy of my visit note below.          UROLOGY OUTPATIENT VISIT      Chief Complaint:   Prostate cancer      Synopsis    Mushtaq Olsen is a very pleasant AGE: 84 year old year old person    He has a history of slow urinary stream.  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  We had worked him up with cystoscopy that was unremarkable.  Digital rectal exam however was abnormal and we updated prostate MRI showing left-sided concerning lesions.  He recently underwent a biopsy which confirmed high risk prostate cancer.  A staging PSMA PET scan did not reveal any evidence of metastatic disease.  He has met with my colleague Dr. Kumari and is in consideration for radical prostatectomy.    Follow-up of his slow urinary stream we had initially recommended he try intermittent catheterization especially before bedtime.  He has not tried this at home as he finds it uncomfortable and prefers to avoid catheterization.  He still continues to wake up 2-3 times at night and has a slow urinary stream but says that this is tolerable.    He underwent urodynamics the results of which I personally reviewed.  He does not appear to have much of any detrusor contraction.  I suspect he is mainly voiding with some assistance with Valsalva maneuvering.  Urodynamics tracing is somewhat erratic.  Ultimately his postvoid residual is low    -Technically complicated study due to inconsistent pressures from catheters, mostly the Pabd, despite catheters being adjusted multiple times during the study.  -Normal bladder capacity (402 mL) with normal filling sensations.  -Due to inconsistent pressures from the Pabd catheter, focus placed on Pves pressures. This remains  largely stable throughout the study, and therefore compliance appears good.  -No rise in Pves pressure during attempt to void, both on the UDS chair and on commode, suggesting no coordinated detrusor contraction.   -With catheters removed, he is able to void 350 mL into a hat in the toilet, for a final PVR of 52 mL, consistent with pre-study cathed PVR.   -Fluoroscopy reveals a mildly-trabeculated bladder wall without diverticulae or cellules.  No vesicoureteral reflux was observed.  The bladder neck was closed during filling and during initial attempt to void on the UDS chair.         Medications     Current Outpatient Medications   Medication Sig Dispense Refill     acetaminophen (TYLENOL) 500 MG tablet Take 500 mg by mouth every 6 hours as needed for mild pain.       allopurinol (ZYLOPRIM) 100 MG tablet Take 2 tablets by mouth daily (2 x 100 mg = 200 mg)       apixaban ANTICOAGULANT (ELIQUIS) 5 MG tablet Take 1 tablet (5 mg) by mouth 2 times daily       atorvastatin (LIPITOR) 20 MG tablet Take 1 tablet by mouth At Bedtime       CHOLECALCIFEROL PO Take 1 tablet by mouth daily OTC uncertain of dose.       ciprofloxacin (CIPRO) 500 MG tablet Take 1 tab in the AM and 1 tab in the PM the day before your procedure, day of and day after. (Patient not taking: Reported on 10/2/2024) 6 tablet 0     diclofenac (VOLTAREN) 75 MG EC tablet Take 75 mg by mouth 2 times daily as needed for moderate pain (Patient not taking: Reported on 9/30/2024)       dronedarone (MULTAQ) 400 MG TABS tablet Take 1 tablet by mouth daily.       esomeprazole (NEXIUM) 20 MG DR capsule Take 2 capsules by mouth every morning (before breakfast). Take 30-60 minutes before eating.       ibuprofen (ADVIL/MOTRIN) 200 MG tablet Take 400 mg by mouth every 6 hours as needed for pain (Patient not taking: Reported on 9/30/2024)       metoprolol succinate ER (TOPROL XL) 25 MG 24 hr tablet Take 1 tablet by mouth daily.       oxyBUTYnin ER (DITROPAN XL) 10 MG 24  hr tablet Take 1 tablet (10 mg) by mouth daily (Patient not taking: Reported on 9/30/2024) 30 tablet 0     tamsulosin (FLOMAX) 0.4 MG capsule Take 1 capsule (0.4 mg) by mouth daily (Patient not taking: Reported on 9/30/2024) 30 capsule 3     traZODone (DESYREL) 50 MG tablet Take 50 mg by mouth At Bedtime (Patient not taking: Reported on 9/30/2024)       No current facility-administered medications for this visit.         The following  distinct labs were reviewed    I personally reviewed all applicable laboratory data and went over findings with patient  Significant for:    CBC RESULTS:  Recent Labs   Lab Test 05/19/23  0526 05/18/23 1217 05/11/23  0933   WBC 14.4* 8.0 6.1   HGB 12.2* 12.6* 12.7*    157 153        BMP RESULTS:  Recent Labs   Lab Test 05/19/23  0526 05/18/23 1217 05/18/23  0845 05/11/23  0933    140  --  141   POTASSIUM 4.0 4.4  --  4.5   CHLORIDE 106 108*  --  105   CO2 21* 22  --  26   ANIONGAP 11 10  --  10   * 129* 101* 94   BUN 24.6* 25.9*  --  30.5*   CR 0.89 1.03  --  1.23*   GFRESTIMATED 86 73  --  59*       CALCIUM RESULTS:  Recent Labs   Lab Test 05/19/23 0526 05/18/23 1217 05/11/23  0933   VA 9.3 9.5 9.6         UA RESULTS:   Recent Labs   Lab Test 09/24/24  1052 06/25/24  1419   SG 1.023 1.023   URINEPH 5.5 5.5   NITRITE Negative Negative   RBCU 4*  --    WBCU 3  --        PSA RESULTS  PSA Tumor Marker   Date Value Ref Range Status   08/01/2024 5.36 ng/mL Final     Comment:     No reference ranges have been established for patients over 80 years.         Recent Imaging Report    I personally reviewed all applicable imaging and went over the below findings with patient.    Results for orders placed or performed during the hospital encounter of 10/03/24   PET PSMA Eyes to Thighs     Value    Radiologist flags Diffuse gastric wall thickening, incompletely    Narrative    Combined Report of: PET and CT on 10/3/2024 2:20 PM:    1. PET of the neck, chest, abdomen, and  pelvis.  2. PET CT Fusion for Attenuation Correction and Anatomical  Localization:    3. 3D MIP and PET-CT fused images were processed on an independent  workstation and archived to PACS and reviewed by a radiologist.    Technique:    1. PET: The patient received 5.7 mCi of Ga-68 PSMA, body weight was 95  kg. Images were evaluated in the axial, sagittal, and coronal planes  as well as the rotational whole body MIP. Images were acquired from  the Vertex to the Proximal Thighs.    UPTAKE WAS MEASURED AT 60 MINUTES.     2. CT: CT only obtained for attenuation correction and not diagnostic  purposes.    INDICATION: Prostate cancer (H)    ADDITIONAL INFORMATION OBTAINED FROM EMR: 84-year-old elevated  prostate-specific antigen, and prior MRI 7/29/2024 which demonstrated  a suspicious abnormality in the left mid peripheral zone with short  segment capsular abutment, and the likelihood of minimal  extraprostatic extension. Biopsy-proven prostate adenocarcinoma in the  area of concern. Initial staging PET.    MOST RECENT PSA: 5.36 ng/mL    COMPARISON: None.    FINDINGS:     Liver SUV mean = 5.4, Aorta SUV mean = 1.7,  Parotid SUV mean = 11.7     PROSTATE GLAND:  Prostate gland is enlarged measuring 6.0 x 4.3 cm (series 4, image  69), and demonstrates focal PSMA receptor expression along the left  mid peripheral zone, with a maximum SUV of 7.3. Additional small focus  of uptake in the right lateral peripheral zone with a max SUV of 7.4.  Linear intense avidity within the prostate likely corresponds to TURP  defect, better characterized on prior prostate MRI.    LYMPH NODES:  No suspicious uptake in the pelvic and retroperitoneal lymph nodes.    No suspicious uptake in other lymph node regions or concerning  lymphadenopathy.    BONES:   No PSMA avid osseous lesions.     Total right hip arthroplasty, hardware appears intact..    OTHER FINDINGS:  Head/Neck:  Brain is grossly unremarkable given lack of IV contrast and low  dose  CT examination. Paranasal sinuses and mastoid air cells are clear.  Vessels are grossly unremarkable given lack of IV contrast. Thyroid is  without dominant nodule or abnormal avidity. No definite adenopathy.    Chest:  No suspicious uptake in the chest.    Implantable cardiac defibrillator in the left chest wall with  appropriate lead positioning. Significant coronary artery  calcifications, with artifact suggesting prior stent placement.  Heart and great vessels are otherwise grossly unremarkable double of  IV contrast.    Respiratory motion artifact limits evaluation of the lung parenchyma.  Bibasilar atelectasis. Groundglass opacities in the right medial  posterior lower lobe (series 4, image 262), with mild uptake may  represent infectious or inflammatory process.    Abdomen/Pelvis:  Evaluation of the solid abdominal and pelvic organs is limited due to  lack of IV contrast.    No focal lesion identified within the hepatic parenchyma given the  limitations of this noncontrast examination. Physiologic increased  uptake within the hepatic and splenic parenchyma.    Atherosclerotic calcifications extending along the infrarenal  abdominal aorta to the common iliac and distal arteries. Small hiatal  hernia. Lipomatous atrophy of the pancreas. Small right-sided  hydrocele.    Diffuse gastric wall thickening, incompletely characterized on this  examination given lack of IV contrast, without radiotracer uptake.  Similar thickening noted on prior outside CT dated 4/20/2023.      Impression    IMPRESSION:  In this 84-year-old with recently diagnosed prostate adenocarcinoma:    1.  Focal PSMA receptor expression in the left peripheral zone  consistent with biopsy proven prostate adenocarcinoma. Additional  focal uptake in the right peripheral zone suspicious for malignancy.   2.  Alisha disease: No PSMA receptor expression in the pelvic or  retroperitoneal lymph nodes. No suspicious uptake in distant lymph  nodes.  3.   Bones: No PSMA avid osseous lesions.  4.  Diffuse gastric wall thickening, incompletely characterized though  not significantly changed compared to prior outside exam, nonspecific.  If not recently performed, upper endoscopy for further catheterization  is recommended.   5.  Additional ancillary findings as described in the body of report.     [Recommend Follow Up: Diffuse gastric wall thickening, incompletely  characterized though not significantly changed compared to prior  outside exam, nonspecific. If not recently performed, upper endoscopy  for further catheterization is recommended]    This report will be copied to the [facility] to ensure a  provider acknowledges the finding. Access Center is available Monday  through Friday 8am-3:30 pm.      Average of SUV means of lesions in the body is 4.6       ----------------------------------------------------------------------  --------------------------------------------  Please score using the miPSMA Expression Score  Score 0: Uptake below blood pool; No PSMA expression  Score 1: Uptake blood pool equal to or above blood pool and lower than  liver* (spleen for F-18); Low PSMA expression  Score 2: Uptake equal to or above liver* (spleen for F-18) and lower  than parotid; Intermediate PSMA expression  Score 3: Uptake equal to or above parotid; High PSMA expression    Source - Hong et al. Prostate Cancer Molecular Imaging Standardized  Evaluation (PROMISE): Proposed [facility] Classification for the  Interpretation of PSMA-Ligand PET/CT. J Nucl Med 2018.  ----------------------------------------------------------------------  --------------------------------------------  Reference - PSA level and corresponding PSMA sensitivity    0.2-0.49    33%  0.5-0.99    45%  1.0-1.99    75%  >2.00        95%  Source - Chandler  et al. Gallium-68 Prostate-specific Membrane Antigen  Positron Emission Tomography in Advanced Prostate Cancer?Updated  Diagnostic Utility,  Sensitivity, Specificity, and Distribution of  Prostate-specific Membrane Antigen-avid Lesions: A Systematic Review  and Patrick-analysis.  Urology April 2020.  ----------------------------------------------------------------------  --------------------------------------------    I have personally reviewed the examination and initial interpretation  and I agree with the findings.    FLACO CAREY MD         SYSTEM ID:  [id number]              Assessment/Plan   84 year old year old person with history of HoLEP, prostate cancer, detrusor hypocontractility  -We had a brief discussion regarding management options of his high risk prostate cancer.  I will ultimately defer decision making to the patient as well as Dr. Kumari.  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  At the moment he appears to be emptying adequately.  Further reduction of prostate size through prostatectomy and surgical removal of gland could help facilitate long-term bladder emptying though there is a chance that he could ultimately require intermittent catheterization or suprapubic tube.      CC:  Saurabh Fuller      Again, thank you for allowing me to participate in the care of your patient.      Sincerely,    Nilson Carcamo MD

## 2024-10-09 DIAGNOSIS — C61 PROSTATE CANCER (H): Primary | ICD-10-CM

## 2024-10-09 RX ORDER — HEPARIN SODIUM 5000 [USP'U]/.5ML
5000 INJECTION, SOLUTION INTRAVENOUS; SUBCUTANEOUS
OUTPATIENT
Start: 2024-10-09

## 2024-10-09 RX ORDER — ACETAMINOPHEN 650 MG/1
650 SUPPOSITORY RECTAL ONCE
OUTPATIENT
Start: 2024-10-09

## 2024-10-09 RX ORDER — CEFAZOLIN SODIUM 2 G/50ML
2 SOLUTION INTRAVENOUS
OUTPATIENT
Start: 2024-10-09

## 2024-10-09 RX ORDER — CEFAZOLIN SODIUM 2 G/50ML
2 SOLUTION INTRAVENOUS SEE ADMIN INSTRUCTIONS
OUTPATIENT
Start: 2024-10-09

## 2024-10-09 RX ORDER — ACETAMINOPHEN 325 MG/1
975 TABLET ORAL ONCE
OUTPATIENT
Start: 2024-10-09 | End: 2024-10-09

## 2024-10-10 ENCOUNTER — TELEPHONE (OUTPATIENT)
Dept: UROLOGY | Facility: CLINIC | Age: 84
End: 2024-10-10
Payer: MEDICARE

## 2024-10-10 NOTE — TELEPHONE ENCOUNTER
Spoke to spouse and let her know the case request was placed yesterday for surgery and they'd be reaching out shortly.    Chantel Yates RN, BSN  [facility]

## 2024-10-15 ENCOUNTER — TELEPHONE (OUTPATIENT)
Dept: UROLOGY | Facility: CLINIC | Age: 84
End: 2024-10-15
Payer: MEDICARE

## 2024-10-15 NOTE — TELEPHONE ENCOUNTER
Spoke to pts spouse about scheduling surgery. Let her know as of right now the soonest we have is 12/19 and we are working on hopefully getting them in 11/14 but it will depend on OR availability. Let her know the surgery scheduling team would call her today. She was agreeable.    Chantel Yates RN, BSN  [facility] Urology

## 2024-10-15 NOTE — TELEPHONE ENCOUNTER
Called patient to schedule surgery with Dr. Kumari    Spoke with:  Patient and his wife    Date of Surgery: 11/14/24    Arrival time Discussed with Patient:  No    Location of surgery: [facility] OR     Pre-Op H&P: with PAC on 11/4    Post-Op Appts: PT will need a 10 day face to face post-op.       Discussed with patient pre-op RN will call 2-3 days prior to surgery with arrival time and instructions:  Yes     Packet sent out: Yes  via [employer] Message [DATE] 10/15/24    Additional Comments:  N/A      All patients questions were answered and patient was instructed to review surgical packet and call back with any questions or concerns.       Ella Early on 10/15/2024 at 11:22 AM

## 2024-10-18 NOTE — TELEPHONE ENCOUNTER
FUTURE VISIT INFORMATION      SURGERY INFORMATION:  Date: 2024  Location:  OR  Surgeon:  Sandy Kumari MD   Anesthesia Type:  General   Procedure: PROSTATECTOMY, ROBOT-ASSISTED, WITH PELVIC LYMPHADENECTOMY     Action    Action Taken 10/18/2024 10:09AM SARA     I called [facility] dept [phone] #1 #0 - I was on hold for 5mins.. they pulled up the pt's chart. They will send the EKG tracing over soon.      RECORDS REQUESTED FROM:       Primary Care Provider: Saurabh Fuller     Pertinent Medical History:    Most recent EKG+ Tracin2018    Most recent ECHO: 2018

## 2024-11-03 LAB
ABO/RH(D): NORMAL
ANTIBODY SCREEN: NEGATIVE
SPECIMEN EXPIRATION DATE: NORMAL

## 2024-11-04 ENCOUNTER — PRE VISIT (OUTPATIENT)
Dept: SURGERY | Facility: CLINIC | Age: 84
End: 2024-11-04

## 2024-11-04 ENCOUNTER — ANESTHESIA EVENT (OUTPATIENT)
Dept: SURGERY | Facility: CLINIC | Age: 84
End: 2024-11-04
Payer: MEDICARE

## 2024-11-04 ENCOUNTER — LAB (OUTPATIENT)
Dept: LAB | Facility: CLINIC | Age: 84
End: 2024-11-04
Payer: MEDICARE

## 2024-11-04 ENCOUNTER — OFFICE VISIT (OUTPATIENT)
Dept: SURGERY | Facility: CLINIC | Age: 84
End: 2024-11-04
Payer: MEDICARE

## 2024-11-04 ENCOUNTER — APPOINTMENT (OUTPATIENT)
Dept: LAB | Facility: CLINIC | Age: 84
End: 2024-11-04
Payer: MEDICARE

## 2024-11-04 VITALS
SYSTOLIC BLOOD PRESSURE: 157 MMHG | RESPIRATION RATE: 16 BRPM | HEIGHT: 70 IN | BODY MASS INDEX: 30.46 KG/M2 | HEART RATE: 74 BPM | DIASTOLIC BLOOD PRESSURE: 88 MMHG | TEMPERATURE: 98 F | OXYGEN SATURATION: 98 % | WEIGHT: 212.8 LBS

## 2024-11-04 DIAGNOSIS — Z01.818 PRE-OP EVALUATION: Primary | ICD-10-CM

## 2024-11-04 DIAGNOSIS — Z01.818 PRE-OP EVALUATION: ICD-10-CM

## 2024-11-04 DIAGNOSIS — C61 PROSTATE CANCER (H): ICD-10-CM

## 2024-11-04 LAB
ANION GAP SERPL CALCULATED.3IONS-SCNC: 10 MMOL/L (ref 7–15)
BUN SERPL-MCNC: 26.5 MG/DL (ref 8–23)
CALCIUM SERPL-MCNC: 9.2 MG/DL (ref 8.8–10.4)
CHLORIDE SERPL-SCNC: 106 MMOL/L (ref 98–107)
CREAT SERPL-MCNC: 1.16 MG/DL (ref 0.67–1.17)
EGFRCR SERPLBLD CKD-EPI 2021: 62 ML/MIN/1.73M2
ERYTHROCYTE [DISTWIDTH] IN BLOOD BY AUTOMATED COUNT: 13.7 % (ref 10–15)
GLUCOSE SERPL-MCNC: 108 MG/DL (ref 70–99)
HCO3 SERPL-SCNC: 24 MMOL/L (ref 22–29)
HCT VFR BLD AUTO: 44.2 % (ref 40–53)
HGB BLD-MCNC: 15.1 G/DL (ref 13.3–17.7)
MCH RBC QN AUTO: 31.9 PG (ref 26.5–33)
MCHC RBC AUTO-ENTMCNC: 34.2 G/DL (ref 31.5–36.5)
MCV RBC AUTO: 93 FL (ref 78–100)
PLATELET # BLD AUTO: 145 10E3/UL (ref 150–450)
POTASSIUM SERPL-SCNC: 4.8 MMOL/L (ref 3.4–5.3)
RBC # BLD AUTO: 4.74 10E6/UL (ref 4.4–5.9)
SODIUM SERPL-SCNC: 140 MMOL/L (ref 135–145)
WBC # BLD AUTO: 6.7 10E3/UL (ref 4–11)

## 2024-11-04 PROCEDURE — 80048 BASIC METABOLIC PNL TOTAL CA: CPT | Performed by: PATHOLOGY

## 2024-11-04 PROCEDURE — 85027 COMPLETE CBC AUTOMATED: CPT | Performed by: PATHOLOGY

## 2024-11-04 PROCEDURE — 36415 COLL VENOUS BLD VENIPUNCTURE: CPT | Performed by: PATHOLOGY

## 2024-11-04 PROCEDURE — 99215 OFFICE O/P EST HI 40 MIN: CPT | Performed by: PHYSICIAN ASSISTANT

## 2024-11-04 ASSESSMENT — PAIN SCALES - GENERAL: PAINLEVEL_OUTOF10: NO PAIN (0)

## 2024-11-04 ASSESSMENT — ENCOUNTER SYMPTOMS
DYSRHYTHMIAS: 1
ORTHOPNEA: 0

## 2024-11-04 ASSESSMENT — LIFESTYLE VARIABLES: TOBACCO_USE: 1

## 2024-11-04 NOTE — H&P
Pre-Operative H & P     CC:  Preoperative exam to assess for increased cardiopulmonary risk while undergoing surgery and anesthesia.    Date of Encounter: 11/4/2024  Primary Care Physician:  Saurabh Fuller     Reason for visit:   Encounter Diagnoses   Name Primary?    Pre-op evaluation Yes    Prostate cancer (H)        HPI  Mushtaq Olsen is a 84 year old male who presents for pre-operative H & P in preparation for  Procedure Information       Case: [id number] Date/Time: 11/14/24 1130    Procedure: PROSTATECTOMY, ROBOT-ASSISTED, WITH PELVIC LYMPHADENECTOMY (Pelvis)    Anesthesia type: General    Diagnosis: Prostate cancer (H) [C61]    Pre-op diagnosis: Prostate cancer (H) [C61]    Location:  OR [address] OR    Providers: Sandy Kumari MD            Patient is being evaluated for comorbid conditions of HTN, hyperlipidemia, atrial fibrillation on Eliquis, 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    He was recently diagnosed with prostate cancer and was evaluated by urology to discuss further treatment options on 10/2/24. He is now scheduled for the above surgery for further management.     History is obtained from the patient and chart review    Hx of abnormal bleeding or anti-platelet use: Eliquis      Past Medical History  Past Medical History:   Diagnosis Date    Atrial fibrillation (H)     AV block, 3rd degree (H)     BPH (benign prostatic hyperplasia)     Cardiac pacemaker     Cataract, right     Gout     Heart disease     Hyperlipidemia     Hypertension     Macular hole     Pacemaker        Past Surgical History  Past Surgical History:   Procedure Laterality Date    CATARACT IOL, RT/LT Right     IMPLANT PACEMAKER      2012, 2020    KNEE SURGERY Left 04/05/2023    LASER HOLMIUM ENUCLEATION PROSTATE N/A 5/18/2023    Procedure: Holmium Laser Enucleation of the Prostate;  Surgeon: Nilson Carcamo MD;  Location:  OR    PROSTATE BIOPSY      TURP  2021    VITRECTOMY PARSPLANA WITH 25 GAUGE SYSTEM  Right 2020    Procedure: Right Eye 25 Gauge Parsplana Vitrectomy, Membrane Peel, Endolaser, Fluid/Gas Exchange, Infusion of 14% C3F8 Gas;  Surgeon: Beata Vasquez MD;  Location: [facility]    YAG CAPSULOTOMY OD (RIGHT EYE)  2016       Prior to Admission Medications  Current Outpatient Medications   Medication Sig Dispense Refill    acetaminophen (TYLENOL) 500 MG tablet Take 500 mg by mouth every 6 hours as needed for mild pain.      allopurinol (ZYLOPRIM) 100 MG tablet Take 2 tablets by mouth every morning. (2 x 100 mg = 200 mg)      apixaban ANTICOAGULANT (ELIQUIS) 5 MG tablet Take 1 tablet (5 mg) by mouth 2 times daily      atorvastatin (LIPITOR) 20 MG tablet Take 1 tablet by mouth At Bedtime      CHOLECALCIFEROL PO Take 1 tablet by mouth daily OTC uncertain of dose.      dronedarone (MULTAQ) 400 MG TABS tablet Take 1 tablet by mouth every morning.      esomeprazole (NEXIUM) 20 MG DR capsule Take 2 capsules by mouth every morning (before breakfast). Take 30-60 minutes before eating.      metoprolol succinate ER (TOPROL XL) 25 MG 24 hr tablet Take 1 tablet by mouth every morning.      ciprofloxacin (CIPRO) 500 MG tablet Take 1 tab in the AM and 1 tab in the PM the day before your procedure, day of and day after. (Patient not taking: Reported on 10/2/2024) 6 tablet 0       Allergies  No Known Allergies    Social History  Social History     Socioeconomic History    Marital status:      Spouse name: Not on file    Number of children: Not on file    Years of education: Not on file    Highest education level: Not on file   Occupational History    Not on file   Tobacco Use    Smoking status: Former     Current packs/day: 0.00     Types: Cigarettes     Quit date:      Years since quittin.8    Smokeless tobacco: Never   Substance and Sexual Activity    Alcohol use: Yes     Comment: Sometimes    Drug use: Not Currently    Sexual activity: Not on file   Other Topics Concern    Not on file    Social History Narrative    Not on file     Social Drivers of Health     Financial Resource Strain: High Risk (1/1/2022)    [facility]    Financial Resource Strain     Difficulty of Paying Living Expenses: Not on file     Difficulty of Paying Living Expenses: Not on file   Food Insecurity: Not on file   Transportation Needs: Not on file   Physical Activity: Not on file   Stress: Not on file   Social Connections: Unknown (1/1/2022)    Received from [facility], [facility]    Social Connections     Frequency of Communication with Friends and Family: Not on file   Interpersonal Safety: Not on file   Housing Stability: Not on file       Family History  Family History   Problem Relation Age of Onset    Glaucoma No family hx of     Macular Degeneration No family hx of     Diabetes No family hx of     Anesthesia Reaction No family hx of     Venous thrombosis No family hx of        Review of Systems  The complete review of systems is negative other than noted in the HPI or here.   Anesthesia Evaluation   Pt has had prior anesthetic. Type: General.    No history of anesthetic complications       ROS/MED HX  ENT/Pulmonary:     (+)     JUAN JOSE risk factors, snores loudly, hypertension,         tobacco use, Past use,                    (-) asthma and recent URI   Neurologic:  - neg neurologic ROS     Cardiovascular:     (+) Dyslipidemia hypertension- -   -  - -   Taking blood thinners           pacemaker, Reason placed: 3rd degree AV block, afib. type: Medtonic,        dysrhythmias, a-fib and 3rd Deg Heart Block,        Previous cardiac testing   Echo: Date: Results:    Stress Test:  Date: 7/19/2018 Results:  Final Impressions:    1. Post stress, normal left ventricular size, increased global systolic function with an estimated EF of >75%.    2. Maximum stress test with  "86.2% of age predicted maximum heart rate achieved.    3. Negative stress echo for ischemia.    4. The EKG was non-diagnostic.    5. Good exercise duration and workload.    6. During stress exam the patient developed no significant symptoms.    7. The aortic valve is trileaflet and sclerotic.  ECG Reviewed:  Date: 7/18/2018 Results:  AV dual-paced rhythm    Abnormal ECG    When compared with ECG of 19-AUG-2016 16:39,    Vent. rate has increased BY  10 BPM  Cath:  Date: Results:   (-) LARSON and orthopnea/PND   METS/Exercise Tolerance:  Comment: METS 4. Limited due to back pain. Reports he is able to walk 2 blocks and ascend 2 flights of stairs without difficulty. Able to walk for 15 minutes continuously. Denies cardiac symptoms.     Hematologic:     (+)      anemia,       (-) history of blood clots and history of blood transfusion   Musculoskeletal: Comment: Gout  S/p L knee surgery 4/2023  Hx of right hip replacement    Low back pain      GI/Hepatic:     (+) GERD, Asymptomatic on medication,               (-) liver disease   Renal/Genitourinary: Comment: Urinary retention      (+)        BPH,   (-) renal disease   Endo:  - neg endo ROS     Psychiatric/Substance Use:  - neg psychiatric ROS     Infectious Disease:  - neg infectious disease ROS  (-) Recent Fever   Malignancy:   (+) Malignancy, History of Skin and Prostate.Prostate CA Active status post.  Skin CA Remission status post Surgery.      Other:  - neg other ROS          BP (!) 177/105 (BP Location: Right arm, Patient Position: Sitting, Cuff Size: Adult Regular)   Pulse 74   Temp 98  F (36.7  C) (Oral)   Resp 16   Ht 1.778 m (5' 10\")   Wt 96.5 kg (212 lb 12.8 oz)   SpO2 98%   BMI 30.53 kg/m      Physical Exam   Constitutional: Pleasant, well-appearing, no apparent distress, and appears stated age.  Eyes: Pupils equal, round and reactive to light, extra ocular muscles intact, sclera clear, conjunctiva normal.  HENT: Normocephalic and atraumatic, oral " pharynx with moist mucus membranes, good dentition. No goiter appreciated.   Respiratory: Clear to auscultation bilaterally, no crackles or wheezing.  Cardiovascular: Regular rate and rhythm, normal S1 and S2, and no murmur noted.  Carotids +2, no bruits. No edema. Palpable pulses to radial  DP and PT arteries.   GI: Non-distended abdomen  Lymph/Hematologic: No cervical lymphadenopathy and no supraclavicular lymphadenopathy.  Genitourinary:  Deferred  Skin: Warm and dry.  No rashes on exposed skin   Musculoskeletal: Full ROM of neck. There is no redness, warmth, or swelling of the visible joints. Gross motor strength is normal.    Neurologic: Awake, alert, oriented to name, place and time. Cranial nerves II-XII are grossly intact. Gait is normal.   Neuropsychiatric: Calm, cooperative. Normal affect.       Prior Labs/Diagnostic Studies   All labs and imaging personally reviewed     EKG/ stress test - if available please see in ROS above   No results found.    Device Interrogation 7/29/24  Narrative & Impression    PURPOSE OF VISIT: CIED programming and device evaluation POST MRI, per MD orders.   Full pacemaker interrogation performed after MRI complete.  Normal Device Function.   Intrinsic Rhythm: AFL w/VS 45-68 bpm  MRI Protection Mode Programmed OFF.  Pacing Programmed From ODO to DDDR  bpm.  Estimated Battery Longevity to RRT= 1.5 years   Battery voltage = 2.92 V.  Permanent Programming Changes: None     Device RN: Colette Figueroa, RN    Dual lead pacemaker       The patient's records and results personally reviewed by this provider.     Outside records reviewed from: Care Everywhere    LAB/DIAGNOSTIC STUDIES TODAY:  CBC, BMP, T&S    Assessment  Mushtaq Olsen is a 84 year old male seen as a PAC referral for risk assessment and optimization for anesthesia.    Plan/Recommendations  Pt will be optimized for the proposed procedure.  See below for details on the assessment, risk, and preoperative  "recommendations    NEUROLOGY  - No history of TIA, CVA or seizure    -Post Op delirium risk factors:  Age    ENT  - No current airway concerns.  Will need to be reassessed day of surgery.  Mallampati: II  TM: > 3    CARDIAC  - No history of CAD  - Afib  Medically managed with Eliquis.   - Hypertension  Managed with metoprolol. Continue day of surgery.   - 3rd degree AV block  S/p Medtronic pacemaker. Last interrogation 7/29/24, results above. Follows with cardiology in [place].     - METS (Metabolic Equivalents)  Patient performs 4 or more METS exercise without symptoms             Total Score: 0      RCRI-Low risk: Class 2 0.9% complication rate             Total Score: 1    RCRI: High Risk Surgery        PULMONARY    JUAN JOSE Medium Risk             Total Score: 4    JUAN JOSE: Snores loudly    JUAN JOSE: Hypertension    JUAN JOSE: Over 50 ys old    JUAN JOSE: Male      - Denies asthma or inhaler use  - Tobacco History    History   Smoking Status    Former    Types: Cigarettes   Smokeless Tobacco    Never       GI  - GERD  Controlled on medications: Proton Pump Inhibitor  PONV Medium Risk  Total Score: 2           1 AN PONV: Patient is not a current smoker    1 AN PONV: Intended Post Op Opioids        /RENAL  - Prostate Cancer  Above surgery planned for further management  - BPH/Urinary retention  Follows with Dr. Carcamo, last visit 10/8/24. S/p HoLEP  - Baseline Creatinine  1.17    ENDOCRINE    - BMI: Estimated body mass index is 30.53 kg/m  as calculated from the following:    Height as of this encounter: 1.778 m (5' 10\").    Weight as of this encounter: 96.5 kg (212 lb 12.8 oz).  Obesity (BMI >30)  - No history of Diabetes Mellitus    HEME  VTE High Risk 3%             Total Score: 8    VTE: Greater than 59 yrs old    VTE: Male    VTE: Current cancer      - Coagulopathy secondary to Apixaban (Eliquis) Hold 2 days prior to surgery.   - Chronic mild anemia  Baseline Hgb: 12.2-12.7  Recommend perioperative use of blood conservation " techniques intraoperatively and close monitoring for postoperative bleeding.  A type and screen has been ordered for this patient    MSK  Patient is NOT Frail             Total Score: 0      - s/p right total knee 4/2023  - history of right KALEB  - Gout  Managed with allopurinol  - Low back pain  Recommend consideration for careful positioning to limit patient discomfort.      Different anesthesia methods/types have been discussed with the patient, but they are aware that the final plan will be decided by the assigned anesthesia provider on the date of service.      The patient is optimized for their procedure. AVS with information on surgery time/arrival time, meds and NPO status given by nursing staff. No further diagnostic testing indicated.      On the day of service:     Prep time: 20 minutes  Visit time: 10 minutes  Documentation time: 10 minutes  ------------------------------------------  Total time: 40 minutes      Yoli Smith PA-C  Preoperative Assessment Center  [facility]  Cl[facility] and [facility]  Phone: [phone]  Fax: [phone]

## 2024-11-04 NOTE — PATIENT INSTRUCTIONS
Name:  Mushtaq Olsen   MRN:  [id number]   :  1940   Today's Date:  2024         You were seen today for a pre-operative assessment in the:    Pre-operative Anesthesia Assessment Center(PAC)  [facility]  [address] [place] [id number]  phone [phone]      You will be receiving a call with location, date, arrival time and diet instructions from Preadmission Nursing at your surgical site:    -[facility]: [phone]    Anesthesia recommendations for medications:    Hold Aspirin for 7 days before procedure.  Hold Multivitamins for 7 days before procedure.   Hold Herbal medications and Supplements for 7 days before procedure.  Hold Ibuprofen for 1 day before procedure.   Hold Naproxen for 4 days before procedure.     No alcohol or cannabis products for 24 hours before your procedure       Special instructions for anticoagulation medications:  Hold Eliquis for 2 days before surgery.      Please DO NOT take the following medications the day of procedure:  Continue to hold Eliquis  Vitamin D3      Please take these medications the day of procedure:  Tylenol as needed  Allopurinol (Zyloprim)  Dronederone (Multaq)  Esomeprazole (Nexium)  Metoprolol         How do I prepare myself?  - Please take 2 showers (one the night prior to surgery and one the morning of surgery) using Scrubcare or Hibiclens soap.    Use this soap only from the neck to your toes.     Leave the soap on your skin for one minute--then rinse thoroughly.      You may use your own shampoo and conditioner. No other hair products.   - Please remove all jewelry and body piercings.  - No lotions, deodorants or fragrance.  - No makeup or fingernail polish.   - Bring your ID and insurance card.    -If you have a Deep Brain Stimulator, a Spinal Cord Stimulator, or any implanted Neuro Device, you must bring the remote to your appointment         For further questions regarding your surgery please call your  surgeon's office.

## 2024-11-05 NOTE — TELEPHONE ENCOUNTER
How Severe Is Your Skin Lesion?: moderate Spoke to spouse and confirmed appt for 10/3 for PET PSMA.    Chantel Yates RN, BSN  RN[facility] Urology     Have Your Skin Lesions Been Treated?: not been treated Is This A New Presentation, Or A Follow-Up?: Skin Lesions Which Family Member (Optional)?: Brother

## 2024-11-07 DIAGNOSIS — C61 PROSTATE CANCER (H): Primary | ICD-10-CM

## 2024-11-08 ENCOUNTER — LAB (OUTPATIENT)
Dept: LAB | Facility: CLINIC | Age: 84
End: 2024-11-08
Payer: MEDICARE

## 2024-11-08 DIAGNOSIS — C61 PROSTATE CANCER (H): ICD-10-CM

## 2024-11-08 LAB
ALBUMIN UR-MCNC: NEGATIVE MG/DL
APPEARANCE UR: CLEAR
BILIRUB UR QL STRIP: NEGATIVE
COLOR UR AUTO: YELLOW
GLUCOSE UR STRIP-MCNC: NEGATIVE MG/DL
HGB UR QL STRIP: NEGATIVE
KETONES UR STRIP-MCNC: NEGATIVE MG/DL
LEUKOCYTE ESTERASE UR QL STRIP: NEGATIVE
MUCOUS THREADS #/AREA URNS LPF: PRESENT /LPF
NITRATE UR QL: NEGATIVE
PH UR STRIP: 5.5 [PH] (ref 5–7)
RBC URINE: <1 /HPF
SP GR UR STRIP: 1.02 (ref 1–1.03)
UROBILINOGEN UR STRIP-MCNC: NORMAL MG/DL
WBC URINE: 2 /HPF

## 2024-11-08 PROCEDURE — 81003 URINALYSIS AUTO W/O SCOPE: CPT

## 2024-11-11 NOTE — PROGRESS NOTES
PTA medications updated by Medication Scribe prior to surgery via phone call with patient (last doses completed by Nurse)     Medication history sources: Patient, Surescripts, and H&P  In the past week, patient estimated taking medication this percent of the time: Greater than 90%      Significant changes made to the medication list:  Patient reports no longer taking the following meds (med scribe removed from PTA med list): Cipro      Additional medication history information:   None    Medication reconciliation completed by provider prior to medication history? No    Time spent in this activity: 35 minutes    The information provided in this note is only as accurate as the sources available at the time of update(s)      Prior to Admission medications    Medication Sig Last Dose Taking? Auth Provider Long Term End Date   acetaminophen (TYLENOL) 500 MG tablet Take 500 mg by mouth every 6 hours as needed for mild pain.  Yes Reported, Patient     allopurinol (ZYLOPRIM) 100 MG tablet Take 100 mg by mouth every morning. Morning Yes Reported, Patient     apixaban ANTICOAGULANT (ELIQUIS) 5 MG tablet Take 1 tablet (5 mg) by mouth 2 times daily Evening Yes Esperanza Jaffe MD     atorvastatin (LIPITOR) 40 MG tablet Take 1 tablet by mouth at bedtime. Evening Yes Reported, Patient Yes    cholecalciferol 50 MCG (2000 UT) tablet Take 1 tablet by mouth daily. Morning Yes Unknown, Entered By History     diphenhydrAMINE-acetaminophen (TYLENOL PM)  MG tablet Take 1 tablet by mouth at bedtime. Evening Yes Reported, Patient     dronedarone (MULTAQ) 400 MG TABS tablet Take 1 tablet by mouth every morning. Morning Yes Reported, Patient Yes    esomeprazole (NEXIUM) 20 MG DR capsule Take 2 capsules by mouth every morning (before breakfast). Take 30-60 minutes before eating. Morning Yes Reported, Patient     metoprolol succinate ER (TOPROL XL) 50 MG 24 hr tablet Take 1 tablet by mouth every morning. Morning Yes Reported,  Patient Yes        Medication history completed by: Nataliia Toledo LPN

## 2024-11-14 ENCOUNTER — HOSPITAL ENCOUNTER (OUTPATIENT)
Facility: CLINIC | Age: 84
LOS: 1 days | Discharge: HOME | End: 2024-11-15
Attending: UROLOGY | Admitting: UROLOGY
Payer: MEDICARE

## 2024-11-14 ENCOUNTER — ANESTHESIA (OUTPATIENT)
Dept: SURGERY | Facility: CLINIC | Age: 84
End: 2024-11-14
Payer: MEDICARE

## 2024-11-14 DIAGNOSIS — I48.0 PAROXYSMAL ATRIAL FIBRILLATION (H): ICD-10-CM

## 2024-11-14 DIAGNOSIS — C61 PROSTATE CANCER (H): Primary | ICD-10-CM

## 2024-11-14 LAB
CREAT SERPL-MCNC: 1.17 MG/DL (ref 0.67–1.17)
EGFRCR SERPLBLD CKD-EPI 2021: 62 ML/MIN/1.73M2
PLATELET # BLD AUTO: 163 10E3/UL (ref 150–450)

## 2024-11-14 PROCEDURE — 272N000002 HC OR SUPPLY OTHER OPNP: Performed by: UROLOGY

## 2024-11-14 PROCEDURE — 250N000011 HC RX IP 250 OP 636: Performed by: UROLOGY

## 2024-11-14 PROCEDURE — 370N000017 HC ANESTHESIA TECHNICAL FEE, PER MIN: Performed by: UROLOGY

## 2024-11-14 PROCEDURE — 272N000001 HC OR GENERAL SUPPLY STERILE: Performed by: UROLOGY

## 2024-11-14 PROCEDURE — 258N000001 HC RX 258: Performed by: UROLOGY

## 2024-11-14 PROCEDURE — 258N000003 HC RX IP 258 OP 636: Performed by: UROLOGY

## 2024-11-14 PROCEDURE — 250N000009 HC RX 250: Performed by: NURSE ANESTHETIST, CERTIFIED REGISTERED

## 2024-11-14 PROCEDURE — 258N000003 HC RX IP 258 OP 636: Performed by: NURSE ANESTHETIST, CERTIFIED REGISTERED

## 2024-11-14 PROCEDURE — 85049 AUTOMATED PLATELET COUNT: CPT | Performed by: UROLOGY

## 2024-11-14 PROCEDURE — 88307 TISSUE EXAM BY PATHOLOGIST: CPT | Mod: TC | Performed by: UROLOGY

## 2024-11-14 PROCEDURE — 250N000011 HC RX IP 250 OP 636: Performed by: NURSE ANESTHETIST, CERTIFIED REGISTERED

## 2024-11-14 PROCEDURE — P9045 ALBUMIN (HUMAN), 5%, 250 ML: HCPCS | Performed by: NURSE ANESTHETIST, CERTIFIED REGISTERED

## 2024-11-14 PROCEDURE — 250N000025 HC SEVOFLURANE, PER MIN: Performed by: UROLOGY

## 2024-11-14 PROCEDURE — 82565 ASSAY OF CREATININE: CPT | Performed by: UROLOGY

## 2024-11-14 PROCEDURE — 250N000013 HC RX MED GY IP 250 OP 250 PS 637: Performed by: UROLOGY

## 2024-11-14 PROCEDURE — 710N000009 HC RECOVERY PHASE 1, LEVEL 1, PER MIN: Performed by: UROLOGY

## 2024-11-14 PROCEDURE — 36415 COLL VENOUS BLD VENIPUNCTURE: CPT | Performed by: UROLOGY

## 2024-11-14 PROCEDURE — 999N000141 HC STATISTIC PRE-PROCEDURE NURSING ASSESSMENT: Performed by: UROLOGY

## 2024-11-14 PROCEDURE — 360N000080 HC SURGERY LEVEL 7, PER MIN: Performed by: UROLOGY

## 2024-11-14 PROCEDURE — 250N000009 HC RX 250: Performed by: UROLOGY

## 2024-11-14 PROCEDURE — 88309 TISSUE EXAM BY PATHOLOGIST: CPT | Mod: TC | Performed by: UROLOGY

## 2024-11-14 RX ORDER — OXYCODONE HYDROCHLORIDE 5 MG/1
10 TABLET ORAL EVERY 4 HOURS PRN
Status: DISCONTINUED | OUTPATIENT
Start: 2024-11-14 | End: 2024-11-15 | Stop reason: HOSPADM

## 2024-11-14 RX ORDER — FENTANYL CITRATE 50 UG/ML
INJECTION, SOLUTION INTRAMUSCULAR; INTRAVENOUS PRN
Status: DISCONTINUED | OUTPATIENT
Start: 2024-11-14 | End: 2024-11-14

## 2024-11-14 RX ORDER — ONDANSETRON 2 MG/ML
INJECTION INTRAMUSCULAR; INTRAVENOUS PRN
Status: DISCONTINUED | OUTPATIENT
Start: 2024-11-14 | End: 2024-11-14

## 2024-11-14 RX ORDER — AMOXICILLIN 250 MG
1 CAPSULE ORAL 2 TIMES DAILY
Status: DISCONTINUED | OUTPATIENT
Start: 2024-11-14 | End: 2024-11-15 | Stop reason: HOSPADM

## 2024-11-14 RX ORDER — FENTANYL CITRATE 0.05 MG/ML
25 INJECTION, SOLUTION INTRAMUSCULAR; INTRAVENOUS EVERY 5 MIN PRN
Status: DISCONTINUED | OUTPATIENT
Start: 2024-11-14 | End: 2024-11-14 | Stop reason: HOSPADM

## 2024-11-14 RX ORDER — LIDOCAINE HYDROCHLORIDE 20 MG/ML
INJECTION, SOLUTION INFILTRATION; PERINEURAL PRN
Status: DISCONTINUED | OUTPATIENT
Start: 2024-11-14 | End: 2024-11-14

## 2024-11-14 RX ORDER — NALOXONE HYDROCHLORIDE 0.4 MG/ML
0.1 INJECTION, SOLUTION INTRAMUSCULAR; INTRAVENOUS; SUBCUTANEOUS
Status: DISCONTINUED | OUTPATIENT
Start: 2024-11-14 | End: 2024-11-14 | Stop reason: HOSPADM

## 2024-11-14 RX ORDER — BISACODYL 10 MG
10 SUPPOSITORY, RECTAL RECTAL DAILY PRN
Status: DISCONTINUED | OUTPATIENT
Start: 2024-11-17 | End: 2024-11-15 | Stop reason: HOSPADM

## 2024-11-14 RX ORDER — NALOXONE HYDROCHLORIDE 0.4 MG/ML
0.2 INJECTION, SOLUTION INTRAMUSCULAR; INTRAVENOUS; SUBCUTANEOUS
Status: DISCONTINUED | OUTPATIENT
Start: 2024-11-14 | End: 2024-11-15 | Stop reason: HOSPADM

## 2024-11-14 RX ORDER — HEPARIN SODIUM 5000 [USP'U]/.5ML
5000 INJECTION, SOLUTION INTRAVENOUS; SUBCUTANEOUS
Status: COMPLETED | OUTPATIENT
Start: 2024-11-14 | End: 2024-11-14

## 2024-11-14 RX ORDER — DEXAMETHASONE SODIUM PHOSPHATE 4 MG/ML
4 INJECTION, SOLUTION INTRA-ARTICULAR; INTRALESIONAL; INTRAMUSCULAR; INTRAVENOUS; SOFT TISSUE
Status: DISCONTINUED | OUTPATIENT
Start: 2024-11-14 | End: 2024-11-14 | Stop reason: HOSPADM

## 2024-11-14 RX ORDER — CEFAZOLIN SODIUM/WATER 2 G/20 ML
2 SYRINGE (ML) INTRAVENOUS
Status: COMPLETED | OUTPATIENT
Start: 2024-11-14 | End: 2024-11-14

## 2024-11-14 RX ORDER — NALOXONE HYDROCHLORIDE 0.4 MG/ML
0.4 INJECTION, SOLUTION INTRAMUSCULAR; INTRAVENOUS; SUBCUTANEOUS
Status: DISCONTINUED | OUTPATIENT
Start: 2024-11-14 | End: 2024-11-15 | Stop reason: HOSPADM

## 2024-11-14 RX ORDER — HYDROMORPHONE HYDROCHLORIDE 1 MG/ML
INJECTION, SOLUTION INTRAMUSCULAR; INTRAVENOUS; SUBCUTANEOUS PRN
Status: DISCONTINUED | OUTPATIENT
Start: 2024-11-14 | End: 2024-11-14

## 2024-11-14 RX ORDER — HEPARIN SODIUM 5000 [USP'U]/.5ML
5000 INJECTION, SOLUTION INTRAVENOUS; SUBCUTANEOUS EVERY 8 HOURS
Status: DISCONTINUED | OUTPATIENT
Start: 2024-11-15 | End: 2024-11-15 | Stop reason: HOSPADM

## 2024-11-14 RX ORDER — PROCHLORPERAZINE MALEATE 5 MG/1
5 TABLET ORAL EVERY 6 HOURS PRN
Status: DISCONTINUED | OUTPATIENT
Start: 2024-11-14 | End: 2024-11-15 | Stop reason: HOSPADM

## 2024-11-14 RX ORDER — HYDROXYZINE HYDROCHLORIDE 10 MG/1
10 TABLET, FILM COATED ORAL EVERY 6 HOURS PRN
Status: DISCONTINUED | OUTPATIENT
Start: 2024-11-14 | End: 2024-11-15 | Stop reason: HOSPADM

## 2024-11-14 RX ORDER — CEFAZOLIN SODIUM/WATER 2 G/20 ML
2 SYRINGE (ML) INTRAVENOUS SEE ADMIN INSTRUCTIONS
Status: DISCONTINUED | OUTPATIENT
Start: 2024-11-14 | End: 2024-11-14 | Stop reason: HOSPADM

## 2024-11-14 RX ORDER — ACETAMINOPHEN 325 MG/1
975 TABLET ORAL ONCE
Status: COMPLETED | OUTPATIENT
Start: 2024-11-14 | End: 2024-11-14

## 2024-11-14 RX ORDER — HYDROMORPHONE HCL IN WATER/PF 6 MG/30 ML
0.2 PATIENT CONTROLLED ANALGESIA SYRINGE INTRAVENOUS EVERY 5 MIN PRN
Status: DISCONTINUED | OUTPATIENT
Start: 2024-11-14 | End: 2024-11-14 | Stop reason: HOSPADM

## 2024-11-14 RX ORDER — TOLTERODINE 2 MG/1
4 CAPSULE, EXTENDED RELEASE ORAL DAILY
Status: DISCONTINUED | OUTPATIENT
Start: 2024-11-14 | End: 2024-11-15 | Stop reason: HOSPADM

## 2024-11-14 RX ORDER — PROPOFOL 10 MG/ML
INJECTION, EMULSION INTRAVENOUS PRN
Status: DISCONTINUED | OUTPATIENT
Start: 2024-11-14 | End: 2024-11-14

## 2024-11-14 RX ORDER — FAMOTIDINE 20 MG/1
20 TABLET, FILM COATED ORAL DAILY
Status: DISCONTINUED | OUTPATIENT
Start: 2024-11-14 | End: 2024-11-15 | Stop reason: HOSPADM

## 2024-11-14 RX ORDER — HYDROMORPHONE HCL IN WATER/PF 6 MG/30 ML
0.4 PATIENT CONTROLLED ANALGESIA SYRINGE INTRAVENOUS EVERY 5 MIN PRN
Status: DISCONTINUED | OUTPATIENT
Start: 2024-11-14 | End: 2024-11-14 | Stop reason: HOSPADM

## 2024-11-14 RX ORDER — FENTANYL CITRATE 0.05 MG/ML
50 INJECTION, SOLUTION INTRAMUSCULAR; INTRAVENOUS EVERY 5 MIN PRN
Status: DISCONTINUED | OUTPATIENT
Start: 2024-11-14 | End: 2024-11-14 | Stop reason: HOSPADM

## 2024-11-14 RX ORDER — ACETAMINOPHEN 325 MG/1
650 TABLET ORAL EVERY 4 HOURS PRN
Status: DISCONTINUED | OUTPATIENT
Start: 2024-11-17 | End: 2024-11-15 | Stop reason: HOSPADM

## 2024-11-14 RX ORDER — HYDRALAZINE HYDROCHLORIDE 20 MG/ML
10 INJECTION INTRAMUSCULAR; INTRAVENOUS EVERY 6 HOURS PRN
Status: DISCONTINUED | OUTPATIENT
Start: 2024-11-14 | End: 2024-11-15 | Stop reason: HOSPADM

## 2024-11-14 RX ORDER — ONDANSETRON 4 MG/1
4 TABLET, ORALLY DISINTEGRATING ORAL EVERY 30 MIN PRN
Status: DISCONTINUED | OUTPATIENT
Start: 2024-11-14 | End: 2024-11-14 | Stop reason: HOSPADM

## 2024-11-14 RX ORDER — CLOPIDOGREL BISULFATE 75 MG/1
75 TABLET ORAL DAILY
Status: ON HOLD | COMMUNITY
End: 2024-11-15

## 2024-11-14 RX ORDER — ONDANSETRON 2 MG/ML
4 INJECTION INTRAMUSCULAR; INTRAVENOUS EVERY 30 MIN PRN
Status: DISCONTINUED | OUTPATIENT
Start: 2024-11-14 | End: 2024-11-14 | Stop reason: HOSPADM

## 2024-11-14 RX ORDER — POLYETHYLENE GLYCOL 3350 17 G/17G
17 POWDER, FOR SOLUTION ORAL DAILY
Status: DISCONTINUED | OUTPATIENT
Start: 2024-11-15 | End: 2024-11-15 | Stop reason: HOSPADM

## 2024-11-14 RX ORDER — HYDROMORPHONE HCL IN WATER/PF 6 MG/30 ML
0.4 PATIENT CONTROLLED ANALGESIA SYRINGE INTRAVENOUS
Status: DISCONTINUED | OUTPATIENT
Start: 2024-11-14 | End: 2024-11-15 | Stop reason: HOSPADM

## 2024-11-14 RX ORDER — SODIUM CHLORIDE, SODIUM LACTATE, POTASSIUM CHLORIDE, CALCIUM CHLORIDE 600; 310; 30; 20 MG/100ML; MG/100ML; MG/100ML; MG/100ML
INJECTION, SOLUTION INTRAVENOUS CONTINUOUS PRN
Status: DISCONTINUED | OUTPATIENT
Start: 2024-11-14 | End: 2024-11-14

## 2024-11-14 RX ORDER — DEXAMETHASONE SODIUM PHOSPHATE 4 MG/ML
INJECTION, SOLUTION INTRA-ARTICULAR; INTRALESIONAL; INTRAMUSCULAR; INTRAVENOUS; SOFT TISSUE PRN
Status: DISCONTINUED | OUTPATIENT
Start: 2024-11-14 | End: 2024-11-14

## 2024-11-14 RX ORDER — LIDOCAINE 40 MG/G
CREAM TOPICAL
Status: DISCONTINUED | OUTPATIENT
Start: 2024-11-14 | End: 2024-11-15 | Stop reason: HOSPADM

## 2024-11-14 RX ORDER — OXYCODONE HYDROCHLORIDE 5 MG/1
5 TABLET ORAL EVERY 4 HOURS PRN
Status: DISCONTINUED | OUTPATIENT
Start: 2024-11-14 | End: 2024-11-15 | Stop reason: HOSPADM

## 2024-11-14 RX ORDER — ONDANSETRON 2 MG/ML
4 INJECTION INTRAMUSCULAR; INTRAVENOUS EVERY 6 HOURS PRN
Status: DISCONTINUED | OUTPATIENT
Start: 2024-11-14 | End: 2024-11-15 | Stop reason: HOSPADM

## 2024-11-14 RX ORDER — ACETAMINOPHEN 325 MG/1
975 TABLET ORAL EVERY 8 HOURS
Status: DISCONTINUED | OUTPATIENT
Start: 2024-11-14 | End: 2024-11-15 | Stop reason: HOSPADM

## 2024-11-14 RX ORDER — HYDROMORPHONE HCL IN WATER/PF 6 MG/30 ML
0.2 PATIENT CONTROLLED ANALGESIA SYRINGE INTRAVENOUS
Status: DISCONTINUED | OUTPATIENT
Start: 2024-11-14 | End: 2024-11-15 | Stop reason: HOSPADM

## 2024-11-14 RX ORDER — ONDANSETRON 4 MG/1
4 TABLET, ORALLY DISINTEGRATING ORAL EVERY 6 HOURS PRN
Status: DISCONTINUED | OUTPATIENT
Start: 2024-11-14 | End: 2024-11-15 | Stop reason: HOSPADM

## 2024-11-14 RX ORDER — BUPIVACAINE HYDROCHLORIDE 2.5 MG/ML
INJECTION, SOLUTION INFILTRATION; PERINEURAL PRN
Status: DISCONTINUED | OUTPATIENT
Start: 2024-11-14 | End: 2024-11-15 | Stop reason: HOSPADM

## 2024-11-14 RX ORDER — ACETAMINOPHEN 650 MG/1
650 SUPPOSITORY RECTAL ONCE
Status: COMPLETED | OUTPATIENT
Start: 2024-11-14 | End: 2024-11-14

## 2024-11-14 RX ORDER — SODIUM CHLORIDE, SODIUM LACTATE, POTASSIUM CHLORIDE, CALCIUM CHLORIDE 600; 310; 30; 20 MG/100ML; MG/100ML; MG/100ML; MG/100ML
INJECTION, SOLUTION INTRAVENOUS CONTINUOUS
Status: DISCONTINUED | OUTPATIENT
Start: 2024-11-14 | End: 2024-11-15

## 2024-11-14 RX ADMIN — Medication 200 MG: at 14:14

## 2024-11-14 RX ADMIN — LIDOCAINE HYDROCHLORIDE 100 MG: 20 INJECTION, SOLUTION INFILTRATION; PERINEURAL at 11:07

## 2024-11-14 RX ADMIN — HYDROMORPHONE HYDROCHLORIDE 0.5 MG: 1 INJECTION, SOLUTION INTRAMUSCULAR; INTRAVENOUS; SUBCUTANEOUS at 13:24

## 2024-11-14 RX ADMIN — FENTANYL CITRATE 50 MCG: 50 INJECTION INTRAMUSCULAR; INTRAVENOUS at 11:29

## 2024-11-14 RX ADMIN — ONDANSETRON 4 MG: 2 INJECTION INTRAMUSCULAR; INTRAVENOUS at 14:00

## 2024-11-14 RX ADMIN — Medication 2 G: at 11:12

## 2024-11-14 RX ADMIN — SODIUM CHLORIDE, POTASSIUM CHLORIDE, SODIUM LACTATE AND CALCIUM CHLORIDE: 600; 310; 30; 20 INJECTION, SOLUTION INTRAVENOUS at 11:01

## 2024-11-14 RX ADMIN — ACETAMINOPHEN 975 MG: 325 TABLET, FILM COATED ORAL at 16:40

## 2024-11-14 RX ADMIN — ACETAMINOPHEN 975 MG: 325 TABLET, FILM COATED ORAL at 09:51

## 2024-11-14 RX ADMIN — ROCURONIUM BROMIDE 20 MG: 50 INJECTION, SOLUTION INTRAVENOUS at 12:30

## 2024-11-14 RX ADMIN — HEPARIN SODIUM 5000 UNITS: 5000 INJECTION, SOLUTION INTRAVENOUS; SUBCUTANEOUS at 10:03

## 2024-11-14 RX ADMIN — ROCURONIUM BROMIDE 10 MG: 50 INJECTION, SOLUTION INTRAVENOUS at 13:47

## 2024-11-14 RX ADMIN — ROCURONIUM BROMIDE 10 MG: 50 INJECTION, SOLUTION INTRAVENOUS at 12:04

## 2024-11-14 RX ADMIN — DEXAMETHASONE SODIUM PHOSPHATE 4 MG: 4 INJECTION, SOLUTION INTRA-ARTICULAR; INTRALESIONAL; INTRAMUSCULAR; INTRAVENOUS; SOFT TISSUE at 11:22

## 2024-11-14 RX ADMIN — FENTANYL CITRATE 50 MCG: 50 INJECTION INTRAMUSCULAR; INTRAVENOUS at 11:07

## 2024-11-14 RX ADMIN — DEXMEDETOMIDINE HYDROCHLORIDE 12 MCG: 100 INJECTION, SOLUTION INTRAVENOUS at 11:30

## 2024-11-14 RX ADMIN — PHENYLEPHRINE HYDROCHLORIDE 0.4 MCG/KG/MIN: 10 INJECTION INTRAVENOUS at 11:14

## 2024-11-14 RX ADMIN — SODIUM CHLORIDE, POTASSIUM CHLORIDE, SODIUM LACTATE AND CALCIUM CHLORIDE: 600; 310; 30; 20 INJECTION, SOLUTION INTRAVENOUS at 13:25

## 2024-11-14 RX ADMIN — SENNOSIDES AND DOCUSATE SODIUM 1 TABLET: 50; 8.6 TABLET ORAL at 20:05

## 2024-11-14 RX ADMIN — PROPOFOL 150 MG: 10 INJECTION, EMULSION INTRAVENOUS at 11:07

## 2024-11-14 RX ADMIN — HYDROMORPHONE HYDROCHLORIDE 0.5 MG: 1 INJECTION, SOLUTION INTRAMUSCULAR; INTRAVENOUS; SUBCUTANEOUS at 11:39

## 2024-11-14 RX ADMIN — DEXMEDETOMIDINE HYDROCHLORIDE 8 MCG: 100 INJECTION, SOLUTION INTRAVENOUS at 11:34

## 2024-11-14 RX ADMIN — ROCURONIUM BROMIDE 60 MG: 50 INJECTION, SOLUTION INTRAVENOUS at 11:07

## 2024-11-14 RX ADMIN — TOLTERODINE 4 MG: 2 CAPSULE, EXTENDED RELEASE ORAL at 16:40

## 2024-11-14 RX ADMIN — ROCURONIUM BROMIDE 10 MG: 50 INJECTION, SOLUTION INTRAVENOUS at 13:15

## 2024-11-14 RX ADMIN — FAMOTIDINE 20 MG: 10 INJECTION, SOLUTION INTRAVENOUS at 20:05

## 2024-11-14 RX ADMIN — SODIUM CHLORIDE, POTASSIUM CHLORIDE, SODIUM LACTATE AND CALCIUM CHLORIDE: 600; 310; 30; 20 INJECTION, SOLUTION INTRAVENOUS at 16:30

## 2024-11-14 RX ADMIN — ALBUMIN (HUMAN): 12.5 SOLUTION INTRAVENOUS at 12:06

## 2024-11-14 ASSESSMENT — ACTIVITIES OF DAILY LIVING (ADL)
ADLS_ACUITY_SCORE: 0

## 2024-11-14 ASSESSMENT — ENCOUNTER SYMPTOMS
ORTHOPNEA: 0
DYSRHYTHMIAS: 1

## 2024-11-14 ASSESSMENT — LIFESTYLE VARIABLES: TOBACCO_USE: 1

## 2024-11-14 NOTE — ANESTHESIA PREPROCEDURE EVALUATION
Anesthesia Pre-Procedure Evaluation    Patient: Mushtaq Olsen   MRN: [id number] : 1940        Procedure : Procedure(s):  PROSTATECTOMY, ROBOT-ASSISTED, WITH PELVIC LYMPHADENECTOMY          Past Medical History:   Diagnosis Date    Atrial fibrillation (H)     AV block, 3rd degree (H)     BPH (benign prostatic hyperplasia)     Cardiac pacemaker     Cataract, right     Gout     Heart disease     Hyperlipidemia     Hypertension     Macular hole     Pacemaker       Past Surgical History:   Procedure Laterality Date    CATARACT IOL, RT/LT Right     IMPLANT PACEMAKER      2020    KNEE SURGERY Left 2023    LASER HOLMIUM ENUCLEATION PROSTATE N/A 2023    Procedure: Holmium Laser Enucleation of the Prostate;  Surgeon: Nilson Carcamo MD;  Location: [facility] OR    PROSTATE BIOPSY      TURP      VITRECTOMY PARSPLANA WITH 25 GAUGE SYSTEM Right 2020    Procedure: Right Eye 25 Gauge Parsplana Vitrectomy, Membrane Peel, Endolaser, Fluid/Gas Exchange, Infusion of 14% C3F8 Gas;  Surgeon: Beata Vasquez MD;  Location: [facility] OR    YAG CAPSULOTOMY OD (RIGHT EYE)  2016      No Known Allergies   Social History     Tobacco Use    Smoking status: Former     Current packs/day: 0.00     Types: Cigarettes     Quit date:      Years since quittin.8    Smokeless tobacco: Never   Substance Use Topics    Alcohol use: Yes     Comment: Sometimes      Wt Readings from Last 1 Encounters:   24 94.7 kg (208 lb 11.2 oz)        Anesthesia Evaluation   Pt has had prior anesthetic. Type: General.    No history of anesthetic complications       ROS/MED HX  ENT/Pulmonary:     (+)     JUAN JOSE risk factors, snores loudly, hypertension,         tobacco use, Past use,                    (-) asthma and recent URI   Neurologic:  - neg neurologic ROS     Cardiovascular: Comment: Full pacemaker interrogation performed after MRI complete. 2024  Normal Device Function.   Intrinsic Rhythm: AFL w/VS 45-68  bpm  MRI Protection Mode Programmed OFF.  Pacing Programmed From ODO to DDDR  bpm.  Estimated Battery Longevity to RRT= 1.5 years   Battery voltage = 2.92 V.  Permanent Programming Changes: None  Dual lead pacemaker      (+) Dyslipidemia hypertension- -   -  - -   Taking blood thinners           pacemaker, Reason placed: 3rd degree AV block, afib. type: Medtonic,        dysrhythmias, a-fib and 3rd Deg Heart Block,        Previous cardiac testing   Echo: Date: Results:    Stress Test:  Date: 7/19/2018 Results:  Final Impressions:    1. Post stress, normal left ventricular size, increased global systolic function with an estimated EF of >75%.    2. Maximum stress test with 86.2% of age predicted maximum heart rate achieved.    3. Negative stress echo for ischemia.    4. The EKG was non-diagnostic.    5. Good exercise duration and workload.    6. During stress exam the patient developed no significant symptoms.    7. The aortic valve is trileaflet and sclerotic.  ECG Reviewed:  Date: 7/18/2018 Results:  AV dual-paced rhythm    Abnormal ECG    When compared with ECG of 19-AUG-2016 16:39,    Vent. rate has increased BY  10 BPM  Cath:  Date: Results:   (-) LARSON and orthopnea/PND   METS/Exercise Tolerance:  Comment: METS 4. Limited due to back pain. Reports he is able to walk 2 blocks and ascend 2 flights of stairs without difficulty. Able to walk for 15 minutes continuously. Denies cardiac symptoms.     Hematologic:     (+)      anemia,       (-) history of blood clots and history of blood transfusion   Musculoskeletal: Comment: Gout  S/p L knee surgery 4/2023  Hx of right hip replacement    Low back pain      GI/Hepatic:     (+) GERD, Asymptomatic on medication,               (-) liver disease   Renal/Genitourinary: Comment: Urinary retention      (+)        BPH,   (-) renal disease   Endo:  - neg endo ROS     Psychiatric/Substance Use:  - neg psychiatric ROS     Infectious Disease:  - neg infectious disease ROS   "(-) Recent Fever   Malignancy:   (+) Malignancy, History of Skin and Prostate.Prostate CA Active status post.  Skin CA Remission status post Surgery.      Other:  - neg other ROS          Physical Exam    Airway        Mallampati: II   TM distance: > 3 FB   Neck ROM: full   Mouth opening: > 3 cm    Respiratory Devices and Support         Dental  no notable dental history     (+) Multiple crowns, permanant bridges      Cardiovascular          Rhythm and rate: regular and normal     Pulmonary   pulmonary exam normal                OUTSIDE LABS:  CBC:   Lab Results   Component Value Date    WBC 6.7 11/04/2024    WBC 14.4 (H) 05/19/2023    HGB 15.1 11/04/2024    HGB 12.2 (L) 05/19/2023    HCT 44.2 11/04/2024    HCT 38.0 (L) 05/19/2023     (L) 11/04/2024     05/19/2023     BMP:   Lab Results   Component Value Date     11/04/2024     05/19/2023    POTASSIUM 4.8 11/04/2024    POTASSIUM 4.0 05/19/2023    CHLORIDE 106 11/04/2024    CHLORIDE 106 05/19/2023    CO2 24 11/04/2024    CO2 21 (L) 05/19/2023    BUN 26.5 (H) 11/04/2024    BUN 24.6 (H) 05/19/2023    CR 1.16 11/04/2024    CR 0.89 05/19/2023     (H) 11/04/2024     (H) 05/19/2023     COAGS: No results found for: \"PTT\", \"INR\", \"FIBR\"  POC: No results found for: \"BGM\", \"HCG\", \"HCGS\"  HEPATIC: No results found for: \"ALBUMIN\", \"PROTTOTAL\", \"ALT\", \"AST\", \"GGT\", \"ALKPHOS\", \"BILITOTAL\", \"BILIDIRECT\", \"REKHA\"  OTHER:   Lab Results   Component Value Date    VA 9.2 11/04/2024       Anesthesia Plan    ASA Status:  3    NPO Status:  NPO Appropriate    Anesthesia Type: General.     - Airway: ETT   Induction: Intravenous, Propofol.   Maintenance: Balanced.   Techniques and Equipment:     - Airway: Video-Laryngoscope       Consents    Anesthesia Plan(s) and associated risks, benefits, and realistic alternatives discussed. Questions answered and patient/representative(s) expressed understanding.     - Discussed:     - Discussed with:  Patient     " "       Postoperative Care    Pain management: IV analgesics.   PONV prophylaxis: Ondansetron (or other 5HT-3), Dexamethasone or Solumedrol, Background Propofol Infusion     Comments:    Other Comments: Pacemaker magnet available in OR           Oscar Wilburn MD    I have reviewed the pertinent notes and labs in the chart from the past 30 days and (re)examined the patient.  Any updates or changes from those notes are reflected in this note.            # Drug Induced Coagulation Defect: home medication list includes an anticoagulant medication              # Overweight: Estimated body mass index is 29.95 kg/m  as calculated from the following:    Height as of this encounter: 1.778 m (5' 10\").    Weight as of this encounter: 94.7 kg (208 lb 11.2 oz).        # Pacemaker present      "

## 2024-11-14 NOTE — OP NOTE
OPERATIVE REPORT  DATE OF PROCEDURE: 11/14/24   PRE-PROCEDURE DIAGNOSIS: Prostate cancer.   POST-PROCEDURE DIAGNOSIS: Prostate cancer.   PROCEDURES PERFORMED:   1) Robotic-assisted laparoscopic radical prostatectomy  2) Bilateral pelvic lymphadenectomy  3) Bilateral laparoscopic TAP block   SURGEON: Sandy Kumari MD - Present for the entire procedure  ASSISTANT: Carolynn REILLY ASSISTANT: TEMO   ANESTHESIA: General with endotracheal intubation.   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 He has been counseled regarding treatment options and presents to undergo surgery.   Findings: Prostate and seminal vesicles removed without complication. Lymph nodes grossly negative. Water-tight anastomosis. Left full and right non nerve sparing. No evidence of extracapsular extension.   Specimens: Periprostatic fat, prostate and seminal vesicle, bilateral pelvic lymph nodes    Estimated blood loss: 100 cc    DESCRIPTION OF PROCEDURE: After the patient was brought to the operating room and induction of general anesthesia, he was prepped and draped in the supine position.  All pressure points were padded.  He was prepped and draped in the usual sterile fashion for transperitoneal pelvic laparoscopy.  Initial access was obtained using a 1.5 cm supraumbilical incision through which we deployed a Veress needle to obtain pneumoperitoneum to a pressure of 20 mmHg.  Subsequently we placed laparoscopic and robotic ports so that there was a total of three robotic 8 mm ports, one 12 mm Air seal port. Juan-Paula device was used to preplace 0-Vicryl fascial stitch at the airseal port.  We then performed bilateral tap block using quarter percent Marcaine.  At this point, we docked the robot and initially mobilized some adhesions of the sigmoid colon out of the pelvic cavity.  We then dropped the bladder from the anterior abdominal wall to enter the retropubic  space of Retzius.  The pubic arch was defined all the way around to the anterior surface of the prostate and the anterior surface was defatted to expose the prostatovesical junction. This was sent off to pathology. Subsequently, the endopelvic fascia was incised laterally from the base to the apex.     Subsequently we divided the anterior bladder neck to enter the lumen of the bladder neck.  Given his prior history of HoLEP, bladder neck was somewhat distorted we were able to clearly visualize both ureteral orifices and keep a safe distance from them. I then incised the posterior bladder neck and entered the retrovesical space.  The dissection was deepened to identify the vasa deferentia and the seminal vesicles on either side.  The vas was freed up and divided and the seminal vesicles were also freed up from the surrounding tissues.  I then incised the posterior layer of Denonvilliers fascia to enter the prerectal space.  The left lateral pedicles were now sequentially clipped and divided nicely preserving the neurovascular complex.  Given some adhesion and concern about extraprostatic extension on the right side, none nerve sparing procedure was performed by clipping the pedicle.  After the posterior dissection was completed, I divided the anterior dorsal venous complex using robotic stapler and then the membranous urethra was divided and the rectourethralis was also divided as well.  The prostate was then completely freed and placed in the EndoCatch bag. Resection bed was then irrigated and hemostasis was obtained.    At this point, we performed bilateral pelvic lymph node dissection.  The limits of the dissection were proximally the common iliac bifurcation, distally the Asad's ligament, laterally the external iliac artery, medially the branches of the internal iliac artery.  All the lymphoid tissue in the space was sent to Pathology separately as left and right pelvic lymph nodes.  At this point, we performed  a Remi stitch to approximate the rectourethralis to the posterior detrusor and performed a running urethrovesical anastomosis using two combined V-Lock sutures.  The vesicourethra anastomosis was performed with two running 3-0 V-loc sutures starting posteriorly and coming up anteriorly on either side. After completing the anastomosis, an indwelling 16-Tunisian Stubbs catheter was placed and the anastomosis was found to be completely watertight after 120 cc was instilled into the bladder.      At this point, we placed a Napoleon drain lateral to the bladder and laparoscopic exit was completed in the usual fashion without any incidents or complications. Prostate and Svs were then removed through the camera port and the incision was then closed with a 0-Vicryl suture. The preplaced fascial stitch at the Airseal port was tied down. The 8 mm port skin incisions were closed with 4-0 Monocryl sutures.  Steri-Strips and dressings were applied to the wound.  The patient was awakened, extubated, and taken to the recovery room in stable condition.  The estimated blood loss was 100 cc.   The patient tolerated the procedure extremely well.  Sponge, needle, and instrument counts were correct x2. He did not receive any blood transfusions, was awakened and taken to the recovery room in stable condition.      Sandy Kumari MD   Department of Urology   [facility]

## 2024-11-14 NOTE — ANESTHESIA CARE TRANSFER NOTE
Patient: Mushtaq Olsen    Procedure: Procedure(s):  ROBOTIC ASSISTED PROSTATECTOMY WITH BILATERAL PELVIC LYMPHADENECTOMY       Diagnosis: Prostate cancer (H) [C61]  Diagnosis Additional Information: No value filed.    Anesthesia Type:   General     Note:    Oropharynx: oropharynx clear of all foreign objects and spontaneously breathing  Level of Consciousness: drowsy  Oxygen Supplementation: face mask  Level of Supplemental Oxygen (L/min / FiO2): 6  Independent Airway: airway patency satisfactory and stable  Dentition: dentition unchanged  Vital Signs Stable: post-procedure vital signs reviewed and stable  Report to RN Given: handoff report given  Patient transferred to: PACU    Handoff Report: Identifed the Patient, Identified the Reponsible Provider, Reviewed the pertinent medical history, Discussed the surgical course, Reviewed Intra-OP anesthesia mangement and issues during anesthesia, Set expectations for post-procedure period and Allowed opportunity for questions and acknowledgement of understanding      Vitals:  Vitals Value Taken Time   /101 11/14/24 1430   Temp 36.8  C (98.3  F) 11/14/24 1423   Pulse 76 11/14/24 1436   Resp 21 11/14/24 1436   SpO2 100 % 11/14/24 1436     Electronically Signed By: eDlvin Pereyra, ALEJANDRINA CRNA  November 14, 2024  2:38 PM

## 2024-11-14 NOTE — PROGRESS NOTES
Notified provider about indwelling marley catheter discussed removal or continued need.    Did provider choose to remove indwelling marley catheter? No    Provider's marley indication for keeping indwelling marley catheter: Surgical procedure    Is there an order for indwelling marley catheter? Yes    *If there is a plan to keep mraley catheter in place at discharge daily notification with provider is not necessary, but please add a notation in the treatment team sticky note that the patient will be discharging with the catheter.

## 2024-11-15 VITALS
OXYGEN SATURATION: 95 % | WEIGHT: 210.32 LBS | SYSTOLIC BLOOD PRESSURE: 145 MMHG | HEART RATE: 118 BPM | DIASTOLIC BLOOD PRESSURE: 89 MMHG | BODY MASS INDEX: 30.11 KG/M2 | HEIGHT: 70 IN | RESPIRATION RATE: 19 BRPM | TEMPERATURE: 98.1 F

## 2024-11-15 LAB
ANION GAP SERPL CALCULATED.3IONS-SCNC: 13 MMOL/L (ref 7–15)
BUN SERPL-MCNC: 16.3 MG/DL (ref 8–23)
CALCIUM SERPL-MCNC: 8.9 MG/DL (ref 8.8–10.4)
CHLORIDE SERPL-SCNC: 99 MMOL/L (ref 98–107)
CREAT SERPL-MCNC: 1.04 MG/DL (ref 0.67–1.17)
EGFRCR SERPLBLD CKD-EPI 2021: 71 ML/MIN/1.73M2
ERYTHROCYTE [DISTWIDTH] IN BLOOD BY AUTOMATED COUNT: 13.7 % (ref 10–15)
GLUCOSE SERPL-MCNC: 112 MG/DL (ref 70–99)
HCO3 SERPL-SCNC: 23 MMOL/L (ref 22–29)
HCT VFR BLD AUTO: 41.2 % (ref 40–53)
HGB BLD-MCNC: 14.1 G/DL (ref 13.3–17.7)
MCH RBC QN AUTO: 31.8 PG (ref 26.5–33)
MCHC RBC AUTO-ENTMCNC: 34.2 G/DL (ref 31.5–36.5)
MCV RBC AUTO: 93 FL (ref 78–100)
PLATELET # BLD AUTO: 187 10E3/UL (ref 150–450)
POTASSIUM SERPL-SCNC: 3.6 MMOL/L (ref 3.4–5.3)
RBC # BLD AUTO: 4.44 10E6/UL (ref 4.4–5.9)
SODIUM SERPL-SCNC: 135 MMOL/L (ref 135–145)
WBC # BLD AUTO: 10.6 10E3/UL (ref 4–11)

## 2024-11-15 PROCEDURE — 96372 THER/PROPH/DIAG INJ SC/IM: CPT | Performed by: UROLOGY

## 2024-11-15 PROCEDURE — 36415 COLL VENOUS BLD VENIPUNCTURE: CPT | Performed by: UROLOGY

## 2024-11-15 PROCEDURE — 80048 BASIC METABOLIC PNL TOTAL CA: CPT | Performed by: UROLOGY

## 2024-11-15 PROCEDURE — 82310 ASSAY OF CALCIUM: CPT | Performed by: UROLOGY

## 2024-11-15 PROCEDURE — 250N000013 HC RX MED GY IP 250 OP 250 PS 637: Performed by: UROLOGY

## 2024-11-15 PROCEDURE — 85027 COMPLETE CBC AUTOMATED: CPT | Performed by: UROLOGY

## 2024-11-15 PROCEDURE — 250N000011 HC RX IP 250 OP 636: Performed by: UROLOGY

## 2024-11-15 PROCEDURE — 258N000003 HC RX IP 258 OP 636: Performed by: UROLOGY

## 2024-11-15 PROCEDURE — 250N000011 HC RX IP 250 OP 636

## 2024-11-15 RX ORDER — CLOPIDOGREL BISULFATE 75 MG/1
75 TABLET ORAL DAILY
COMMUNITY
Start: 2024-11-15

## 2024-11-15 RX ORDER — KETOROLAC TROMETHAMINE 15 MG/ML
15 INJECTION, SOLUTION INTRAMUSCULAR; INTRAVENOUS EVERY 6 HOURS PRN
Status: DISCONTINUED | OUTPATIENT
Start: 2024-11-15 | End: 2024-11-15 | Stop reason: HOSPADM

## 2024-11-15 RX ORDER — KETOROLAC TROMETHAMINE 10 MG/1
10 TABLET, FILM COATED ORAL EVERY 6 HOURS PRN
Qty: 20 TABLET | Refills: 0 | Status: SHIPPED | OUTPATIENT
Start: 2024-11-15

## 2024-11-15 RX ORDER — CIPROFLOXACIN 500 MG/1
500 TABLET, FILM COATED ORAL ONCE
Qty: 1 TABLET | Refills: 0 | Status: SHIPPED | OUTPATIENT
Start: 2024-11-15 | End: 2024-11-15

## 2024-11-15 RX ADMIN — FAMOTIDINE 20 MG: 20 TABLET, FILM COATED ORAL at 09:08

## 2024-11-15 RX ADMIN — POLYETHYLENE GLYCOL 3350 17 G: 17 POWDER, FOR SOLUTION ORAL at 09:08

## 2024-11-15 RX ADMIN — SODIUM CHLORIDE, POTASSIUM CHLORIDE, SODIUM LACTATE AND CALCIUM CHLORIDE: 600; 310; 30; 20 INJECTION, SOLUTION INTRAVENOUS at 02:05

## 2024-11-15 RX ADMIN — TOLTERODINE 4 MG: 2 CAPSULE, EXTENDED RELEASE ORAL at 09:08

## 2024-11-15 RX ADMIN — HEPARIN SODIUM 5000 UNITS: 5000 INJECTION, SOLUTION INTRAVENOUS; SUBCUTANEOUS at 06:24

## 2024-11-15 RX ADMIN — SENNOSIDES AND DOCUSATE SODIUM 1 TABLET: 50; 8.6 TABLET ORAL at 09:08

## 2024-11-15 RX ADMIN — HYDRALAZINE HYDROCHLORIDE 10 MG: 20 INJECTION INTRAMUSCULAR; INTRAVENOUS at 01:00

## 2024-11-15 RX ADMIN — KETOROLAC TROMETHAMINE 15 MG: 15 INJECTION, SOLUTION INTRAMUSCULAR; INTRAVENOUS at 09:10

## 2024-11-15 RX ADMIN — OXYCODONE HYDROCHLORIDE 10 MG: 5 TABLET ORAL at 06:28

## 2024-11-15 RX ADMIN — ACETAMINOPHEN 975 MG: 325 TABLET, FILM COATED ORAL at 00:50

## 2024-11-15 ASSESSMENT — ACTIVITIES OF DAILY LIVING (ADL)
ADLS_ACUITY_SCORE: 0

## 2024-11-15 NOTE — PLAN OF CARE
Goal Outcome Evaluation:    Date & Time: 11/14/2024, [id number].   Surgery/POD#: POD 0 Robotic Assisted Prostatectomy Prostatectomy with Bilateral Pelvic Lymphadenectomy   Behavior & Aggression: Green, calm & cooperative  Fall Risk: Yes  Orientation: A&O x4  ABNL VS/O2: HTN (171/88), MD aware.  PRN Hydralazine available. Other VSS on 2L O2  ABNL Labs: today's labs WNL, refer to chart  Pain Management:Pain controlled with sched Tylenol and ice packs. Pt refused all opioids because of previous complications with constipation  Bowel/Bladder: Stubbs cath in place with red-yellow urine. No BM this shift  Drains: PIV infusing LR @ 100mL/hr.  BRIAN drain with bright red output.    Wounds/incisions: 4 port sites & 1 mini midline, all approximated & liquid bandage. Noted multiple scabs, peeling areas on skin, eczema.  2 RN skin check completed with Kaley Hess RN.   Diet: Clear liquid diet  Activity Level: Assist x1 GB/WK  Tests/Procedures:   Anticipated  DC Date:   Significant Information: permanent pacemaker in place

## 2024-11-15 NOTE — PHARMACY-ADMISSION MEDICATION HISTORY
"Pharmacist Admission Medication History    Admission medication history is complete. The information provided in this note is only as accurate as the sources available at the time of the update.    Information Source(s): Patient, Clinic records, and CareEverywhere/SureScripts via in-person    Pertinent Information:   - no SureScript fill history for apixaban and dronedarone but patient states he is taking \"Eliquis and Multaq\" (looks like some previous notes mentioned amiodarone vs dronedarone 400 mg twice daily) but patient states he takes Multac 400 mg 1 tablet in the morning only.  - he also states that he still takes clopidogrel   - allopurinol prescribed as 100 mg twice daily but patient states he only takes 1 tablet (100 mg) in morning   - he no longer takes gabapentin and oxybutynin    Changes made to PTA medication list:  Added: clopidogrel  Deleted: None  Changed: None    Allergies reviewed with patient and updates made in EHR: yes    Medication History Completed By: Latosha Mckeon [facility] 11/14/2024 7:47 PM    PTA Med List   Medication Sig Note Last Dose/Taking    acetaminophen (TYLENOL) 500 MG tablet Take 500 mg by mouth every 6 hours as needed for mild pain.  11/13/2024    allopurinol (ZYLOPRIM) 100 MG tablet Take 100 mg by mouth every morning. 11/14/2024: Prescribed as 100 mg twice daily but patient states he only takes 100 mg in the morning 11/14/2024 at  8:00 AM    apixaban ANTICOAGULANT (ELIQUIS) 5 MG tablet Take 1 tablet (5 mg) by mouth 2 times daily 11/11/2024: Pt states last dose: 11/12 (on hold for surgery)    Past Week    atorvastatin (LIPITOR) 40 MG tablet Take 1 tablet by mouth at bedtime.  11/13/2024    cholecalciferol 50 MCG (2000 UT) tablet Take 1 tablet by mouth daily.  11/13/2024    clopidogrel (PLAVIX) 75 MG tablet Take 75 mg by mouth daily.  11/14/2024 Morning    diphenhydrAMINE-acetaminophen (TYLENOL PM)  MG tablet Take 1 tablet by mouth at bedtime.  11/13/2024    dronedarone (MULTAQ) " 400 MG TABS tablet Take 1 tablet by mouth every morning.  11/14/2024 at  8:00 AM    esomeprazole (NEXIUM) 20 MG DR capsule Take 2 capsules by mouth every morning (before breakfast). Take 30-60 minutes before eating.  11/14/2024 at  8:00 AM    metoprolol succinate ER (TOPROL XL) 50 MG 24 hr tablet Take 1 tablet by mouth every morning.  11/14/2024 at  8:00 AM

## 2024-11-15 NOTE — PROGRESS NOTES
"Urology  Progress Note    Feels well  Doesn't want to take opioids   Hungry for food    Exam  /89 (BP Location: Right arm)   Pulse 118   Temp 98.1  F (36.7  C) (Oral)   Resp 19   Ht 1.778 m (5' 10\")   Wt 95.4 kg (210 lb 5.1 oz)   SpO2 95%   BMI 30.18 kg/m    No acute distress  Unlabored breathing on room air  Abdomen soft, appropriately tender, nondistended. Incisions c/d/i  BRIAN serosanguinous  Stubbs with clear yellow urine in tubing    Labs  WBC 10.6  Hgb 14.1  Cr 1.04    Assessment/Plan  84 year old y/o male POD#1 s/p RALP     Neuro: tylenol and oxy/dilaudid PRNs in addition to toradol for pain control  CV: PAOLA  Pulm: incentive spirometry while awake  FEN/GI: Regular diet, MIVF @ dc/hr  Endo: PAOLA  : Stubbs to remain in place for about 1 week, BRIAN to be removed prior to discharge  Heme/ID: No infectious concerns at this time  Activity: Up ad myke, goal to ambulate QID   PPx: SCDs in bed, [facility]  Dispo: Home today    Discussed with Dr. Kenyetta Kaminski MD   Urology Resident PGY-5       "

## 2024-11-15 NOTE — PROGRESS NOTES
Discharge education provided to patient. Patient left with all personal belongings. Patient discharging to home and transportation provided by Martine.

## 2024-11-15 NOTE — PROGRESS NOTES
Patient VSS are at baseline: NOT MET - HTN, PRN hydralazine x1    Patient able to ambulate as they were prior to admission or with assist devices provided by therapies during their stay: NOT MET - X1 gb/walker    Patient able to tolerate oral intake and maintain hydration status: NOT MET  - clear liquid diet ordered    Patient has adequate pain control using oral analgesics: MET    Patient must be voiding adequate amounts: MET - pt will discharge with marley catheter    Hypercapnia, Hypoventilation or hypoxia resolved for at least 2 hours without supplemental oxygen: MET    Deficits in sensation, mobility or coordination have resolved if spinal or regional anesthesia used: MET    Patient has returned to baseline mental status: MET       Shift: 7p-7a  Surgery/POD#: POD 1 from a robotic prostatectomy w/ bilat pelvic lymphadenectomy.  Orientation: A&O x4  ABNL VS/O2: VSS on RA, ex HTN - PRN hydralazine x1  Pain Management: scheduled tylenol, pt refused opioids & ice packs overnight   Bowel/Bladder: marley catheter, bowel sounds hypoactive, not passing flatus, no BM  Drains: marley catheter, RLQ BRIAN drain   Lines: PIV infusing LR @ 100 ml/hr  Skin: x4 abd laps & x1 mini - RYANN with surgical glue  Diet: clear liquids, denies N/V  Activity Level: x1 gb/walker, ambulated in room last night  Tests/Procedures: NA  Anticipated  DC Date: 11/15?  Significant Information:   Urology following. Pt will discharge with marley. PCDs on overnight.     Pt hesitant to discharge today due to his pain, educated pt on utilizing other pain meds (not just tylenol). Pt finally agreed to oxycodone this morning around 6:30am

## 2024-11-15 NOTE — PROGRESS NOTES
"MD Notification    Notified Person: Sandy Kuamri MD    Notified Person Name:    Notification Date/Time:11/14/2024, @ 1834    Notification Interaction: Vocera msg    Purpose of Notification: \"Hi, pt has a BP of 171/88. Can you add PRN hydralazine with parameters to the MAR? Thank you, FERCHO Stoner, gen surg\"    Orders Received:    Comments:    "

## 2024-11-15 NOTE — ANESTHESIA POSTPROCEDURE EVALUATION
Patient: Mushtaq Olsen    Procedure: Procedure(s):  ROBOTIC ASSISTED PROSTATECTOMY WITH BILATERAL PELVIC LYMPHADENECTOMY       Anesthesia Type:  General    Note:  Disposition: Inpatient   Postop Pain Control: Uneventful            Sign Out: Well controlled pain   PONV: No   Neuro/Psych: Uneventful            Sign Out: Acceptable/Baseline neuro status   Airway/Respiratory: Uneventful            Sign Out: Acceptable/Baseline resp. status   CV/Hemodynamics: Uneventful            Sign Out: Acceptable CV status   Other NRE: NONE   DID A NON-ROUTINE EVENT OCCUR? No           Last vitals:  Vitals Value Taken Time   /71 11/14/24 1530   Temp 36.9  C (98.5  F) 11/14/24 1500   Pulse 73 11/14/24 1541   Resp 8 11/14/24 1541   SpO2 99 % 11/14/24 1544   Vitals shown include unfiled device data.    Electronically Signed By: Oscar Wilburn MD  November 14, 2024  7:51 PM

## 2024-11-18 LAB
PATH REPORT.COMMENTS IMP SPEC: ABNORMAL
PATH REPORT.COMMENTS IMP SPEC: ABNORMAL
PATH REPORT.COMMENTS IMP SPEC: YES
PATH REPORT.FINAL DX SPEC: ABNORMAL
PATH REPORT.GROSS SPEC: ABNORMAL
PATH REPORT.MICROSCOPIC SPEC OTHER STN: ABNORMAL
PATH REPORT.RELEVANT HX SPEC: ABNORMAL
PATHOLOGY SYNOPTIC REPORT: ABNORMAL
PHOTO IMAGE: ABNORMAL

## 2024-11-20 NOTE — RESULT ENCOUNTER NOTE
I have personally reviewed the pathology results which support a diagnosis of prostatic adenocarcinoma.  Sites involved in this diagnosis include: Prostate      Sandy Kumari MD

## 2024-11-21 ENCOUNTER — ALLIED HEALTH/NURSE VISIT (OUTPATIENT)
Dept: UROLOGY | Facility: CLINIC | Age: 84
End: 2024-11-21
Payer: MEDICARE

## 2024-11-21 DIAGNOSIS — C61 PROSTATE CANCER (H): Primary | ICD-10-CM

## 2024-11-21 NOTE — PROGRESS NOTES
Catheter removal documentation on 11/21/2024:    Mushtaq Olsen presents to the clinic at the request of Dr Kumari for catheter removal.  Reason for removal:  Post operative   Order has been verified. yes  Catheter successfully removed at 10:19 AM without immediate complication after removing 12 ml fluid from the balloon.  150 cc's of urine present in the catheter bag.  Urethral meatus is free of secretions and encrustation.  The patient is afebrile.  The patient tolerated the procedure and was instructed to monitor for abdominal pain or discomfort. Monitor for urine output and be seen in an ER if he cannot void within 6 hours or if he develops abdominal pain.      Patient also c/o drainage from puncture site on RLQ abdomen. He is draining serous fluid from this site and is changing the dressing several times a day. There is also a large bruise there. Chantel Yates RNCC notified. I informed patient that drainage normal and that he should continue to change the dressing as needed.    Deepika Og RN

## 2024-11-26 ENCOUNTER — PRE VISIT (OUTPATIENT)
Dept: UROLOGY | Facility: CLINIC | Age: 84
End: 2024-11-26
Payer: MEDICARE

## 2024-11-27 ENCOUNTER — OFFICE VISIT (OUTPATIENT)
Dept: UROLOGY | Facility: CLINIC | Age: 84
End: 2024-11-27
Payer: MEDICARE

## 2024-11-27 VITALS
SYSTOLIC BLOOD PRESSURE: 138 MMHG | WEIGHT: 207 LBS | BODY MASS INDEX: 29.63 KG/M2 | DIASTOLIC BLOOD PRESSURE: 83 MMHG | HEIGHT: 70 IN | OXYGEN SATURATION: 99 % | HEART RATE: 78 BPM

## 2024-11-27 DIAGNOSIS — C61 PROSTATE CANCER (H): Primary | ICD-10-CM

## 2024-11-27 RX ORDER — BUPIVACAINE HYDROCHLORIDE 5 MG/ML
10 INJECTION, SOLUTION PERINEURAL ONCE
Status: COMPLETED | OUTPATIENT
Start: 2024-11-27 | End: 2024-11-27

## 2024-11-27 RX ADMIN — BUPIVACAINE HYDROCHLORIDE 50 MG: 5 INJECTION, SOLUTION PERINEURAL at 16:53

## 2024-11-27 ASSESSMENT — PAIN SCALES - GENERAL: PAINLEVEL_OUTOF10: MODERATE PAIN (4)

## 2024-11-27 NOTE — NURSING NOTE
"Chief Complaint   Patient presents with    Consult     Pt states here for a post op on 11/14/24     Pt states the pain is about a 3 or 4. He states that when he wakes up he feels some tightness or pain. He just goes through the pain unless its really bad. He states that its hard to walk due to the ablation of the lumbar spine that he had last week. Other than that he has been doing well and the pain has been getting less and less. Pt states that both incisions are leaking     Height 1.778 m (5' 10\"), weight 93.9 kg (207 lb). Body mass index is 29.7 kg/m .    Patient Active Problem List   Diagnosis    Macular hole of right eye    BPH (benign prostatic hyperplasia)    Prostate cancer (H)       No Known Allergies    Current Outpatient Medications   Medication Sig Dispense Refill    acetaminophen (TYLENOL) 500 MG tablet Take 500 mg by mouth every 6 hours as needed for mild pain.      allopurinol (ZYLOPRIM) 100 MG tablet Take 100 mg by mouth every morning.      apixaban ANTICOAGULANT (ELIQUIS) 5 MG tablet Take 1 tablet (5 mg) by mouth 2 times daily. Please hold for 5 days after surgery      atorvastatin (LIPITOR) 40 MG tablet Take 1 tablet by mouth at bedtime.      cholecalciferol 50 MCG (2000 UT) tablet Take 1 tablet by mouth daily.      clopidogrel (PLAVIX) 75 MG tablet Take 1 tablet (75 mg) by mouth daily. Please hold for 5 days after surgery      diphenhydrAMINE-acetaminophen (TYLENOL PM)  MG tablet Take 1 tablet by mouth at bedtime.      dronedarone (MULTAQ) 400 MG TABS tablet Take 1 tablet by mouth every morning.      esomeprazole (NEXIUM) 20 MG DR capsule Take 2 capsules by mouth every morning (before breakfast). Take 30-60 minutes before eating.      ketorolac (TORADOL) 10 MG tablet Take 1 tablet (10 mg) by mouth every 6 hours as needed for moderate pain. 20 tablet 0    metoprolol succinate ER (TOPROL XL) 50 MG 24 hr tablet Take 1 tablet by mouth every morning.         Social History     Tobacco Use    " Smoking status: Former     Current packs/day: 0.00     Types: Cigarettes     Quit date:      Years since quittin.9    Smokeless tobacco: Never   Substance Use Topics    Alcohol use: Yes     Comment: Sometimes    Drug use: Not Currently       Cyrus Lester  2024  4:17 PM

## 2024-11-27 NOTE — LETTER
"11/27/2024       RE: Mushtaq Olsen  [address] [place]  [place] [facility] [address]     Dear Colleague,    Thank you for referring your patient, Mushtaq Olsen, to the [facility] [place] at [facility]. Please see a copy of my visit note below.    Urology Clinic     HPI  Mushtaq Olsen is a 84 year old male with history of prostate cancer sp robotic radical prostatectomy, here for follow-up.    His main issue is urinary incontinence and also leakage from the drain site that has not subsided.     They are wintering in [place] for the next 7 to 8 months and would like to get connected to urologist at [facility].        PHYSICAL EXAM  /83   Pulse 78   Ht 1.778 m (5' 10\")   Wt 93.9 kg (207 lb)   SpO2 99%   BMI 29.70 kg/m     Constitutional: AO, pleasant, NAD  Resp: Non-labored breathing on room air  Abd: Soft, NT, ND, drain site is open but no clear discharge was immediately apparent  EXT: WWP    Labs  Lab Results   Component Value Date    WBC 10.6 11/15/2024     Lab Results   Component Value Date    RBC 4.44 11/15/2024     Lab Results   Component Value Date    HGB 14.1 11/15/2024     Lab Results   Component Value Date    HCT 41.2 11/15/2024     No components found for: \"MCT\"  Lab Results   Component Value Date    MCV 93 11/15/2024     Lab Results   Component Value Date    MCH 31.8 11/15/2024     Lab Results   Component Value Date    MCHC 34.2 11/15/2024     Lab Results   Component Value Date    RDW 13.7 11/15/2024     Lab Results   Component Value Date     11/15/2024        Last Comprehensive Metabolic Panel:  Sodium   Date Value Ref Range Status   11/15/2024 135 135 - 145 mmol/L Final     Potassium   Date Value Ref Range Status   11/15/2024 3.6 3.4 - 5.3 mmol/L Final     Chloride   Date Value Ref Range Status   11/15/2024 99 98 - 107 mmol/L Final     Carbon Dioxide (CO2)   Date Value Ref Range Status   11/15/2024 23 22 - 29 " mmol/L Final     Anion Gap   Date Value Ref Range Status   11/15/2024 13 7 - 15 mmol/L Final     Glucose   Date Value Ref Range Status   11/15/2024 112 (H) 70 - 99 mg/dL Final     GLUCOSE BY METER POCT   Date Value Ref Range Status   05/18/2023 101 (H) 70 - 99 mg/dL Final     Urea Nitrogen   Date Value Ref Range Status   11/15/2024 16.3 8.0 - 23.0 mg/dL Final     Creatinine   Date Value Ref Range Status   11/15/2024 1.04 0.67 - 1.17 mg/dL Final     GFR Estimate   Date Value Ref Range Status   11/15/2024 71 >60 mL/min/1.73m2 Final     Comment:     eGFR calculated using 2021 CKD-EPI equation.     Calcium   Date Value Ref Range Status   11/15/2024 8.9 8.8 - 10.4 mg/dL Final     Comment:     Reference intervals for this test were updated on 7/16/2024 to reflect our healthy population more accurately. There may be differences in the flagging of prior results with similar values performed with this method. Those prior results can be interpreted in the context of the updated reference intervals.       PSA Tumor Marker   Date Value Ref Range Status   08/01/2024 5.36 ng/mL Final     Comment:     No reference ranges have been established for patients over 80 years.          ASSESSMENT AND PLAN  84 year old male for followup of P T3a N0 prostatic adenocarcinoma status post robotic radical prostatectomy on 11/14/2024, delayed closure of the drain site with no clear evidence of infection    The drain site was prepped and draped in a sterile fashion.  After injecting half percent Marcaine around the incision site, a 3-0 Vicryl suture was placed in a figure-of-eight fashion to close the incision site.  It was then covered with 2 x 2 gauze was taped in place.  He tolerated the procedure well.      Plan   Will place a referral for Dr. Valentín Rahman at [facility] for PSA surveillance and PT referral for incontinence    40 total minutes spent on the date of the encounter including direct interaction with the patient,  performing chart review, documentation and further activities as noted above    Sandy Kumari MD   Department of Urology   [facility]                   Again, thank you for allowing me to participate in the care of your patient.      Sincerely,    Sandy Kumari MD

## 2024-11-27 NOTE — PROGRESS NOTES
"Urology Clinic     HPI  Mushtaq Olsen is a 84 year old male with history of prostate cancer sp robotic radical prostatectomy, here for follow-up.    His main issue is urinary incontinence and also leakage from the drain site that has not subsided.     They are wintering in [place] for the next 7 to 8 months and would like to get connected to urologist at [facility].        PHYSICAL EXAM  /83   Pulse 78   Ht 1.778 m (5' 10\")   Wt 93.9 kg (207 lb)   SpO2 99%   BMI 29.70 kg/m     Constitutional: AO, pleasant, NAD  Resp: Non-labored breathing on room air  Abd: Soft, NT, ND, drain site is open but no clear discharge was immediately apparent  EXT: WWP    Labs  Lab Results   Component Value Date    WBC 10.6 11/15/2024     Lab Results   Component Value Date    RBC 4.44 11/15/2024     Lab Results   Component Value Date    HGB 14.1 11/15/2024     Lab Results   Component Value Date    HCT 41.2 11/15/2024     No components found for: \"MCT\"  Lab Results   Component Value Date    MCV 93 11/15/2024     Lab Results   Component Value Date    MCH 31.8 11/15/2024     Lab Results   Component Value Date    MCHC 34.2 11/15/2024     Lab Results   Component Value Date    RDW 13.7 11/15/2024     Lab Results   Component Value Date     11/15/2024        Last Comprehensive Metabolic Panel:  Sodium   Date Value Ref Range Status   11/15/2024 135 135 - 145 mmol/L Final     Potassium   Date Value Ref Range Status   11/15/2024 3.6 3.4 - 5.3 mmol/L Final     Chloride   Date Value Ref Range Status   11/15/2024 99 98 - 107 mmol/L Final     Carbon Dioxide (CO2)   Date Value Ref Range Status   11/15/2024 23 22 - 29 mmol/L Final     Anion Gap   Date Value Ref Range Status   11/15/2024 13 7 - 15 mmol/L Final     Glucose   Date Value Ref Range Status   11/15/2024 112 (H) 70 - 99 mg/dL Final     GLUCOSE BY METER POCT   Date Value Ref Range Status   05/18/2023 101 (H) 70 - 99 mg/dL Final     Urea Nitrogen   Date Value Ref Range " Status   11/15/2024 16.3 8.0 - 23.0 mg/dL Final     Creatinine   Date Value Ref Range Status   11/15/2024 1.04 0.67 - 1.17 mg/dL Final     GFR Estimate   Date Value Ref Range Status   11/15/2024 71 >60 mL/min/1.73m2 Final     Comment:     eGFR calculated using 2021 CKD-EPI equation.     Calcium   Date Value Ref Range Status   11/15/2024 8.9 8.8 - 10.4 mg/dL Final     Comment:     Reference intervals for this test were updated on 7/16/2024 to reflect our healthy population more accurately. There may be differences in the flagging of prior results with similar values performed with this method. Those prior results can be interpreted in the context of the updated reference intervals.       PSA Tumor Marker   Date Value Ref Range Status   08/01/2024 5.36 ng/mL Final     Comment:     No reference ranges have been established for patients over 80 years.          ASSESSMENT AND PLAN  84 year old male for followup of P T3a N0 prostatic adenocarcinoma status post robotic radical prostatectomy on 11/14/2024, delayed closure of the drain site with no clear evidence of infection    The drain site was prepped and draped in a sterile fashion.  After injecting half percent Marcaine around the incision site, a 3-0 Vicryl suture was placed in a figure-of-eight fashion to close the incision site.  It was then covered with 2 x 2 gauze was taped in place.  He tolerated the procedure well.      Plan   Will place a referral for Dr. Valentín Rahman at [facility] for PSA surveillance and PT referral for incontinence    40 total minutes spent on the date of the encounter including direct interaction with the patient, performing chart review, documentation and further activities as noted above    Sandy Kumari MD   Department of Urology   [facility]

## 2024-11-27 NOTE — TELEPHONE ENCOUNTER
Reason for visit: follow up     Relevant information: prostatectomy 11/14/24    Records/imaging/labs/orders: in epic    Pt called: No need for a call    At Rooming: james Fischer  11/26/2024  9:13 PM

## 2025-03-31 ENCOUNTER — TELEPHONE (OUTPATIENT)
Dept: UROLOGY | Facility: CLINIC | Age: 85
End: 2025-03-31
Payer: MEDICARE

## 2025-03-31 DIAGNOSIS — C61 PROSTATE CANCER (H): Primary | ICD-10-CM

## 2025-03-31 NOTE — TELEPHONE ENCOUNTER
[facility] Call Center    Phone Message    May a detailed message be left on voicemail: no     Reason for Call: Other: Shayy from [facility] would like a call back from patients care team. They also wanted to let the team know they put patients results in epic. Please call to discuss.      Action Taken: Message routed to:  [facility] ([facility] Urology    Travel Screening: Not Applicable     Date of Service:

## 2025-03-31 NOTE — TELEPHONE ENCOUNTER
Spoke with ISAAC Reese at [facility]. Recent PSA 0.793. Recommend referral to radiation therapy.     Patient had expressed the desire to return to [place] for radiation therapy. They have a family trip planned out of the country on 06/28 and have a strong desire to complete treatment before this time. RN will discuss next steps with Dr. Kumari and return call to patient.    FERCHO Le  Care Coordinator- Urology   [phone]

## 2025-03-31 NOTE — TELEPHONE ENCOUNTER
Referral placed. Called patient's spouse to inform them. They are agreeable to the plan and will await call to schedule initial appointment.     FERCHO Le  Care Coordinator- Urology   [phone]

## 2025-04-01 ENCOUNTER — PATIENT OUTREACH (OUTPATIENT)
Dept: ONCOLOGY | Facility: CLINIC | Age: 85
End: 2025-04-01
Payer: MEDICARE

## 2025-04-01 NOTE — PROGRESS NOTES
New Patient Radiation Oncology Nurse Navigator Note     Referring provider:   Referred By    Provider Department Location Phone   Sandy Kumari MD [facility] [phone]        Referred to (specialty): Radiation Oncology    Date Referral Received:   3/31/25     Evaluation for :   elevated PSA s/p prostatectomy, refer for radiation therapy     Clinical History (per Nurse review of records provided):    Patient with history of prostate cancer s/p prostatectomy on 11/14/24 with Dr. Kumari in [place].  Final pathology demonstrated Riddleton 4+5=9 adenocarcinoma. The tumor had EPE. Positive margin. There was no evidence of seminal vesicle invasion or lymph node metastasis (pT3a).    He over-wintered in [place] and most recent PSA 03/24/25, PSA, Ultrasensitive   0.793    Patient is referred to radiation oncology for discussion of RT.    Pertinent urology notes, pathology/labs & imaging bookmarked**        Records Location (Care Everywhere, Media, etc.):   Epic     Previous radiation treatment: No       I called Mushtaq to discuss referral to oncology.  I introduced my role and reviewed what this consult visit will entail/what to expect.  I reviewed the location options.  He would like [facility].  He was driving right now and could not write anything down.  He asked our new patient scheduling team call his wife to schedule.   Mushtaq has no other questions at this time.    I forwarded on referral with scheduling instructions for the following PLAN:   SCHEDULE: Rad onc @ pt choice of location, NEW, in person, when patient will be back in [place] end April    [phone] call wife, Lauren - to schedule    1150  Lauren called me back.  I discussed the above with her.  They are meeting with a urologist or oncologist in [place] tomorrow.  They would like to attend that appointment and then call for scheduling.  She has no questions at this time.        Marietta Skelton, RN, BSN  Oncology New  Patient Nurse Navigator   [facility]  [phone]

## 2025-04-09 NOTE — TELEPHONE ENCOUNTER
RECORDS STATUS - ALL OTHER DIAGNOSIS      RECORDS RECEIVED FROM: [facility] - Internal records   DATE RECEIVED: 4/9

## 2025-04-18 ENCOUNTER — PRE VISIT (OUTPATIENT)
Dept: RADIATION ONCOLOGY | Facility: CLINIC | Age: 85
End: 2025-04-18
Payer: MEDICARE

## 2025-04-22 ENCOUNTER — PATIENT OUTREACH (OUTPATIENT)
Dept: ONCOLOGY | Facility: CLINIC | Age: 85
End: 2025-04-22
Payer: MEDICARE

## 2025-04-22 DIAGNOSIS — C61 PROSTATE CANCER (H): Primary | ICD-10-CM

## 2025-04-22 NOTE — PROGRESS NOTES
New Patient Oncology Nurse Navigator Note     Referring provider:   Referred By    Provider Department Location Phone   Guanaco Olsen MD [facility] Radiation Oncology [facility] [phone]     Referred to (specialty): Medical Oncology    Date Referral Received:   04/22/25     Evaluation for : prostate cancer     Clinical History (per Nurse review of records provided):    See prior NN note and rad onc note for full history    Prostate cancer pt s/p R1 RALP with rising PSA.  Needs to be seen for consideration of systemic treatment.     Pertinent urology notes, pathology/labs & imaging bookmarked**      Records Location (Care Everywhere, Media, etc.):   Epic      Writer received referral and chart reviewed.    Referral forwarded on to new patient scheduling   with the following plan:      SCHEDULE: HOLD: Dr. Webster, 4/24, 8-9 AM [place] [facility], in person  Otherwise   SCHEDULE: med onc for prostate cancer @ pt choice of location, NEW, in person, within 1 - 2 week

## 2025-04-23 NOTE — PROGRESS NOTES
PRIMARY CARE PHYSICIAN: Saurabh Fuller MD  UROLOGY: Sandy Kumari MD  RADIATION ONCOLOGY: Guanaco Olsen MD    HISTORY OF PRESENT ILLNESS: Patient is an 85-year-old male with stage FRANKIE adenocarcinoma of the prostate (pT3b, cN1, cM0, PSA 5.36 ng/mL, grade group 5).  Patient was diagnosed with stage IIIC adenocarcinoma of the prostate (pT3b, pN0, cM0, PSA 5.36 ng/mL, grade group 5) and presented in urology clinic 06/25/2024, with urinary frequency, decreased force of stream, nocturia 4-5 times per night, left prostate nodule on digital rectal exam, and PSA 5.36 ng/mL (08/01/2024). MRI prostate 07/29/2024, revealed estimated prostate size of 43 g.  There was a 10 mm ill-defined T2 hypointense lesion with focal DWI diffusion restriction in the 5:00 position of the left mid gland peripheral zone with 6/15 mm capsular abutment (PI-RADS 4) labeled as lesion 1.  There was no neurovascular bundle involvement.  There was no seminal vesicle involvement.  There was no lymphadenopathy or suspicious bone lesions.  There were postsurgical changes related to prior TURP and the transition zone with circumscribed transition zone nodules without diffusion restriction (PI-RADS 2). Cystoscopy 08/27/2024, was negative for bladder obstruction or urethral stricture, and the bladder was unremarkable. UroNav-guided MRI-ultrasound fusion prostate core needle biopsy 09/13/2024, revealed a Pindall 4+5=9 adenocarcinoma involving 15% of 1 of 2 core needle biopsy samples from the right base, 20% of 1 of 2 biopsy samples from the right mid, and 70% of 2 of 2 biopsy samples from target lesion 1 in the left mid peripheral zone.  There was a Wiliam grade 4+4=8 adenocarcinoma involving 30% of 1 of 2 core needle biopsy samples from the left base, 30% of 2 of 2 biopsy samples with perineural invasion and suspicion of vascular invasion from the left mid, and 20% of 2 of 2 biopsy samples from the left apex.  There was benign prostatic tissue in 2 of 2 core  needle biopsy samples from the right apex (9/14 biopsy samples involved). 68-Ga PSMA-11 PET/CT scan 10/03/2024, revealed prostate enlargement measuring 6 x 4.3 cm with focal PSMA uptake along the left mid peripheral zone with SUV max 7.3.  There was an additional small focus of PSMA uptake in the right lateral peripheral zone with SUV max 7.4).  There was linear intense PSMA avidity in the prostate corresponding to the TURP defect.  There were no suspicious regional or distant PSMA-avid lesions.     Patient underwent robotic-assisted laparoscopic prostatectomy and bilateral pelvic lymph node dissection 11/14/2024, revealed a Wiliam 4+5=9 acinar adenocarcinoma involving 30% of the right to left posterolateral mid to base of the gland. There was intraductal carcinoma and cribriform glands present.  There was lymphovascular invasion.  There was multifocal extraprostatic extension involving the left posterolateral mid gland.  There was invasion of the left seminal vesicle.  There was no bladder neck invasion.  There was unifocal 0.1 cm involvement of the left posterior lateral mid margin at the site of extraprostatic extension. Three regional lymph nodes were negative for metastases (0/3 lymph nodes involved).  Pathology was [facility] at the [facility]. Postoperative PSA was 0.173 ng/mL (03/24/2025).  Restaging 68-Ga PSMA-11 PET/CT scan 04/04/2025, revealed postsurgical changes related to prior prostatectomy with focal PSMA uptake in the surgical bed.  There were few mildly PSMA avid subcentimeter pelvic regional lymph nodes including a 0.4 cm left presacral lymph node with SUV 1.4 that was unchanged, a 0.3 cm left presacral lymph node with SUV 2.6 that was unchanged, a 0.4 cm right pelvic sidewall lymph node with SUV 2.3 (previously 0.2 cm).  There were no other PSMA-avid lesions. Radiation oncology consultation 04/18/2025, determine that there was suspicious pelvic lymphadenopathy, some of which were in  close proximity to small bowel precluding definitive radiation therapy. Patient has urinary frequency, urgency, nocturia 3 times per night, and minimal urinary stress incontinence with coughing or sneezing.  Patient does not require an incontinence pad.  Patient has chronic low back pain due to lumbar radiculopathy, which limits his ability to walk long distances.  Patient exercises in the pool and walks the dog.  He denies fever, anorexia, weight loss, headache, cough, dyspnea, chest pain, abdominal pain, nausea, vomiting, constipation, diarrhea, bleeding, or bone pain.     PAST HISTORY:   -Hypertension.  -Hyperlipidemia.  -Paroxysmal atrial fibrillation/flutter.  -Complete heart block status post pacemaker implantation, 01/27/2010; generator replacement 04/06/2017.  -Amaurosis fugax, 06/04/2024.  -Obstructive sleep apnea.  -Stage 3 chronic kidney disease.  -Stage FRANKIE adenocarcinoma of the prostate (pT3b, cN1, cM0, PSA 5.36 ng/mL, grade group 5).  -Fatty liver.  -Colonoscopy was not done.  -Hypothyroid  -Psoriasis.  -Excision of basal cell skin cancer.  -Hyperuricemia.  -Lumbar radiculopathy.  -Left total knee replacement, 04/05/2023.  -BPH with urinary retention status post TURP 05/05/2021, that revealed benign prostatic parenchyma with nodular stromal and glandular hyperplasia; holmium laser enucleation of the prostate 05/18/2023, that revealed benign prostatic tissue.  -Macular hole status post pars plana vitrectomy and internal limiting membrane peel, right eye, 07/21/2020.  -YAG laser capsulotomy, right eye, 09/07/2016.  -Cataract Extraction, intraocular lens implant, right eye, 2015; left eye, 2021.  -Right total hip arthroplasty, 2005    ALLERGIES: No known drug allergies.    MEDICATIONS:   Current Outpatient Medications   Medication Sig Dispense Refill    allopurinol (ZYLOPRIM) 100 MG tablet Take 100 mg by mouth every morning.      apixaban ANTICOAGULANT (ELIQUIS) 5 MG tablet Take 1 tablet (5 mg) by mouth 2  times daily. Please hold for 5 days after surgery      atorvastatin (LIPITOR) 40 MG tablet Take 1 tablet by mouth at bedtime.      cholecalciferol 50 MCG (2000 UT) tablet Take 1 tablet by mouth daily.      diclofenac (VOLTAREN) 50 MG EC tablet Take 50 mg by mouth 2 times daily.      diphenhydrAMINE-acetaminophen (TYLENOL PM)  MG tablet Take 1 tablet by mouth at bedtime.      dronedarone (MULTAQ) 400 MG TABS tablet Take 1 tablet by mouth every morning.      esomeprazole (NEXIUM) 20 MG DR capsule Take 2 capsules by mouth every morning (before breakfast). Take 30-60 minutes before eating.      acetaminophen (TYLENOL) 500 MG tablet Take 500 mg by mouth every 6 hours as needed for mild pain.      clopidogrel (PLAVIX) 75 MG tablet Take 1 tablet (75 mg) by mouth daily. Please hold for 5 days after surgery (Patient not taking: Reported on 2025)      ketorolac (TORADOL) 10 MG tablet Take 1 tablet (10 mg) by mouth every 6 hours as needed for moderate pain. (Patient not taking: Reported on 2025) 20 tablet 0    metoprolol succinate ER (TOPROL XL) 50 MG 24 hr tablet Take 1 tablet by mouth every morning.         FAMILY HISTORY: Father  in his 40s during the World War II holocaust.  Mother  at age 90 of natural causes and had a history of cardiac disease.  There is a half brother (from mother) age 76 with myeloma and coronary artery disease.  There is 1 son age 60 with hypertension and hyperlipidemia.  There are 2 daughters ages 57 and 54 who are both alive and well.    SOCIAL HISTORY: Patient was born in [place] and moved to [place] at age 12 with his family.  Patient relocated to [place] at age 23 and then moved to [place] in .  He is  62 years and lives in [place], [place] with his spouse.  Patient spends 6 months in [place], [place] during the winter.  Patient was employed in textiles import, wholesale, and retail called [employer] from .  He is currently working in  "real estate.  He has a 22-pack-year smoking history.  He drinks whiskey or martini daily.  Patient had a tour of duty in the [employer] for 3 years.    Social History     Tobacco Use    Smoking status: Former     Current packs/day: 0.00     Types: Cigarettes     Quit date:      Years since quittin.3    Smokeless tobacco: Never   Substance Use Topics    Alcohol use: Yes     Comment: Sometimes    Drug use: Not Currently       REVIEW OF SYSTEMS: Review of systems reviewed with the patient and otherwise negative except for those detailed above.    PHYSICAL EXAM: BP (!) 148/82   Pulse 63   Temp 97.9  F (36.6  C) (Oral)   Resp 16   Ht 1.727 m (5' 8\")   Wt 93 kg (205 lb)   SpO2 97%   BMI 31.17 kg/m  .  Body surface area is 2.11 meters squared. ECOG performance status: 1.  Skin: No erythema or rash.  HEENT: Sclera nonicteric.  Conjunctiva normal.  Pupils equal round reactive.  Nose clear.  Tongue and uvula midline.  Oropharynx without lesions or ulceration, mucosa pink and moist.  Neck: Supple without masses.  Nodes: No cervical, supraclavicular, axillary, or inguinal adenopathy.  Lungs: No dullness to percussion.  No rales, wheezes, rhonchi.  Heart: Regular rate and rhythm.  Abdomen: Bowel sounds present.  Soft, nontender, no hepatosplenomegaly or mass.  Extremities: No edema.  Neurologic: Sensory, motor, cerebellar normal.     IMPRESSION/PLAN: Stage FRANKIE adenocarcinoma of the prostate.  Prostate cancer is a Ransom 4+5=9 acinar adenocarcinoma with intraductal carcinoma involving 30% of the prostate, lymphovascular invasion, extraprostatic extension, invasion of the left seminal vesicle, positive surgical margin, but without lymph node metastases.  Patient underwent definitive surgery (2024).  There is PSA persistence with postoperative PSA of 0 173 ng/mL (2025).  There are suspicious PSMA-avid subcentimeter pelvic lymph nodes in the presacral and right pelvic sidewall area detected on " restaging PSMA PET/CT scan (04/04/2025). Patient has stage IV prostate cancer and germline cancer gene mutation panel ([employer]), and somatic next generation sequencing ([facility]) on the prostatectomy sample (11/14/2024) will be performed to evaluate for mutations and biomarkers that can direct effective therapies.   Radiation oncology consultation (04/18/2025) felt that the suspicious pelvic lymphadenopathy was in close proximity to small bowel precluding definitive radiation therapy.  Patient opted against radiation oncology consultation and [facility].  Patient declined the  phase Ib clinical trial testing xaluritamig for high risk biochemical recurrence of nonmetastatic castration sensitive prostate cancer after definitive therapy.  Low volume metastatic castrate sensitive prostate cancer may be treated with androgen deprivation therapy (ADT) plus androgen receptor pathway inhibitor such as abiraterone (LATITUDE clinical trial, N Engl J Med 2017;377:352-360), enzalutamide (ENZAMET clinical trial, N Engl J Med 2019;381:121-131), or apalutamide (TITAN clinical trial, N Engl J Med 2019;381(1):13-24).  Patient would benefit from ADT consisting of leuprolide 22.5 mg every 3 months plus enzalutamide 160 mg daily, that provides superior progression-free and overall survival compared with ADT plus a nonsteroidal antiandrogen antagonistsin patients with metastatic, hormone-sensitive prostate cancer.  I reviewed the risks and side effects of enzalutamide with the patient, which include fatigue, weakness, flushing, anorexia, weight loss, headache, dizziness, change in taste sensation, breast tenderness and swelling, constipation, diarrhea, peripheral edema, myalgias, arthralgias, falls, forgetfulness, depression, hypertension, and seizures.  Leuprolide is associated with fatigue, hot flashes, weakness, loss of muscle mass, weight gain, forgetfulness, depression, irritability, breast enlargement, loss of libido, impotence,  joint stiffness and pain, coronary artery disease, osteoporosis, and bone fracture.  Patient understood the indication and risks and agreed to proceed. Bicalutamide 50 mg daily x21 days will be initiated at the time of the first leuprolide injection.  Thereafter bicalutamide will be replaced by enzalutamide.  Patient is traveling to [place] tomorrow and will drive back to [place] with his spouse.  Patient wishes to begin therapy after returning to [place] on 05/26/2025.  Patient will return to clinic 05/26/2025, with CBC, metabolic panel, PSA, testosterone and for the first leuprolide dose. The current and past history, clinical evaluation, reviewing diagnostic tests and imaging with the patient, assessment, complex management of enzalutamide/leuprolide toxicities, and planning occurred over 60 minutes.     Addendum: Enzalutamide is associated with multiple drug interactions, in particular with dronedarone and apixaban, which makes monitoring of these medications and their therapeutic effects exquisitely difficult.  Enzalutamide will be replaced by abiraterone 1000 mg daily on an empty stomach plus prednisone 5 mg daily with breakfast in addition to ADT.      Polo Webster MD    cc: MD Sandy Echevarria MD Clayton Chen, MD

## 2025-04-28 ENCOUNTER — PATIENT OUTREACH (OUTPATIENT)
Dept: ONCOLOGY | Facility: CLINIC | Age: 85
End: 2025-04-28
Payer: MEDICARE

## 2025-04-28 ENCOUNTER — ONCOLOGY VISIT (OUTPATIENT)
Dept: ONCOLOGY | Facility: CLINIC | Age: 85
End: 2025-04-28
Attending: RADIOLOGY
Payer: MEDICARE

## 2025-04-28 ENCOUNTER — PRE VISIT (OUTPATIENT)
Dept: ONCOLOGY | Facility: CLINIC | Age: 85
End: 2025-04-28
Payer: MEDICARE

## 2025-04-28 VITALS
DIASTOLIC BLOOD PRESSURE: 82 MMHG | WEIGHT: 205 LBS | OXYGEN SATURATION: 97 % | HEIGHT: 68 IN | SYSTOLIC BLOOD PRESSURE: 148 MMHG | BODY MASS INDEX: 31.07 KG/M2 | HEART RATE: 63 BPM | RESPIRATION RATE: 16 BRPM | TEMPERATURE: 97.9 F

## 2025-04-28 DIAGNOSIS — C61 PROSTATE CANCER (H): Primary | ICD-10-CM

## 2025-04-28 PROCEDURE — G0463 HOSPITAL OUTPT CLINIC VISIT: HCPCS | Performed by: INTERNAL MEDICINE

## 2025-04-28 PROCEDURE — 99205 OFFICE O/P NEW HI 60 MIN: CPT | Performed by: INTERNAL MEDICINE

## 2025-04-28 RX ORDER — BICALUTAMIDE 50 MG/1
50 TABLET, FILM COATED ORAL DAILY
Qty: 21 TABLET | Refills: 0 | Status: SHIPPED | OUTPATIENT
Start: 2025-04-28 | End: 2025-05-19

## 2025-04-28 ASSESSMENT — PAIN SCALES - GENERAL: PAINLEVEL_OUTOF10: NO PAIN (0)

## 2025-04-28 NOTE — LETTER
4/28/2025      Mushtaq Olsen  [address]  [place] [address]      Dear Colleague,    Thank you for referring your patient, Mushtaq Olsen, to the [facility]. Please see a copy of my visit note below.      PRIMARY CARE PHYSICIAN: Saurabh Fuller MD  UROLOGY: Sandy Kumari MD  RADIATION ONCOLOGY: Guanaco Olsen MD    HISTORY OF PRESENT ILLNESS: Patient is an 85-year-old male with stage FRANKIE adenocarcinoma of the prostate (pT3b, cN1, cM0, PSA 5.36 ng/mL, grade group 5).  Patient was diagnosed with stage IIIC adenocarcinoma of the prostate (pT3b, pN0, cM0, PSA 5.36 ng/mL, grade group 5) and presented in urology clinic 06/25/2024, with urinary frequency, decreased force of stream, nocturia 4-5 times per night, left prostate nodule on digital rectal exam, and PSA 5.36 ng/mL (08/01/2024). MRI prostate 07/29/2024, revealed estimated prostate size of 43 g.  There was a 10 mm ill-defined T2 hypointense lesion with focal DWI diffusion restriction in the 5:00 position of the left mid gland peripheral zone with 6/15 mm capsular abutment (PI-RADS 4) labeled as lesion 1.  There was no neurovascular bundle involvement.  There was no seminal vesicle involvement.  There was no lymphadenopathy or suspicious bone lesions.  There were postsurgical changes related to prior TURP and the transition zone with circumscribed transition zone nodules without diffusion restriction (PI-RADS 2). Cystoscopy 08/27/2024, was negative for bladder obstruction or urethral stricture, and the bladder was unremarkable. UroNav-guided MRI-ultrasound fusion prostate core needle biopsy 09/13/2024, revealed a Wiliam 4+5=9 adenocarcinoma involving 15% of 1 of 2 core needle biopsy samples from the right base, 20% of 1 of 2 biopsy samples from the right mid, and 70% of 2 of 2 biopsy samples from target lesion 1 in the left mid peripheral zone.  There was a Wiliam grade 4+4=8 adenocarcinoma involving 30% of 1 of 2 core  needle biopsy samples from the left base, 30% of 2 of 2 biopsy samples with perineural invasion and suspicion of vascular invasion from the left mid, and 20% of 2 of 2 biopsy samples from the left apex.  There was benign prostatic tissue in 2 of 2 core needle biopsy samples from the right apex (9/14 biopsy samples involved). 68-Ga PSMA-11 PET/CT scan 10/03/2024, revealed prostate enlargement measuring 6 x 4.3 cm with focal PSMA uptake along the left mid peripheral zone with SUV max 7.3.  There was an additional small focus of PSMA uptake in the right lateral peripheral zone with SUV max 7.4).  There was linear intense PSMA avidity in the prostate corresponding to the TURP defect.  There were no suspicious regional or distant PSMA-avid lesions.     Patient underwent robotic-assisted laparoscopic prostatectomy and bilateral pelvic lymph node dissection 11/14/2024, revealed a Wiliam 4+5=9 acinar adenocarcinoma involving 30% of the right to left posterolateral mid to base of the gland. There was intraductal carcinoma and cribriform glands present.  There was lymphovascular invasion.  There was multifocal extraprostatic extension involving the left posterolateral mid gland.  There was invasion of the left seminal vesicle.  There was no bladder neck invasion.  There was unifocal 0.1 cm involvement of the left posterior lateral mid margin at the site of extraprostatic extension. Three regional lymph nodes were negative for metastases (0/3 lymph nodes involved).  Pathology was reviewed at the [facility]. Postoperative PSA was 0.173 ng/mL (03/24/2025).  Restaging 68-Ga PSMA-11 PET/CT scan 04/04/2025, revealed postsurgical changes related to prior prostatectomy with focal PSMA uptake in the surgical bed.  There were few mildly PSMA avid subcentimeter pelvic regional lymph nodes including a 0.4 cm left presacral lymph node with SUV 1.4 that was unchanged, a 0.3 cm left presacral lymph node with SUV 2.6 that was  unchanged, a 0.4 cm right pelvic sidewall lymph node with SUV 2.3 (previously 0.2 cm).  There were no other PSMA-avid lesions. Radiation oncology consultation 04/18/2025, determine that there was suspicious pelvic lymphadenopathy, some of which were in close proximity to small bowel precluding definitive radiation therapy. Patient has urinary frequency, urgency, nocturia 3 times per night, and minimal urinary stress incontinence with coughing or sneezing.  Patient does not require an incontinence pad.  Patient has chronic low back pain due to lumbar radiculopathy, which limits his ability to walk long distances.  Patient exercises in the pool and walks the dog.  He denies fever, anorexia, weight loss, headache, cough, dyspnea, chest pain, abdominal pain, nausea, vomiting, constipation, diarrhea, bleeding, or bone pain.     PAST HISTORY:   -Hypertension.  -Hyperlipidemia.  -Paroxysmal atrial fibrillation/flutter.  -Complete heart block status post pacemaker implantation, 01/27/2010; generator replacement 04/06/2017.  -Amaurosis fugax, 06/04/2024.  -Obstructive sleep apnea.  -Stage 3 chronic kidney disease.  -Stage FRANKIE adenocarcinoma of the prostate (pT3b, cN1, cM0, PSA 5.36 ng/mL, grade group 5).  -Fatty liver.  -Colonoscopy was not done.  -Hypothyroid  -Psoriasis.  -Excision of basal cell skin cancer.  -Hyperuricemia.  -Lumbar radiculopathy.  -Left total knee replacement, 04/05/2023.  -BPH with urinary retention status post TURP 05/05/2021, that revealed benign prostatic parenchyma with nodular stromal and glandular hyperplasia; holmium laser enucleation of the prostate 05/18/2023, that revealed benign prostatic tissue.  -Macular hole status post pars plana vitrectomy and internal limiting membrane peel, right eye, 07/21/2020.  -YAG laser capsulotomy, right eye, 09/07/2016.  -Cataract Extraction, intraocular lens implant, right eye, 2015; left eye, 2021.  -Right total hip arthroplasty, 2005    ALLERGIES: No known  drug allergies.    MEDICATIONS:   Current Outpatient Medications   Medication Sig Dispense Refill     allopurinol (ZYLOPRIM) 100 MG tablet Take 100 mg by mouth every morning.       apixaban ANTICOAGULANT (ELIQUIS) 5 MG tablet Take 1 tablet (5 mg) by mouth 2 times daily. Please hold for 5 days after surgery       atorvastatin (LIPITOR) 40 MG tablet Take 1 tablet by mouth at bedtime.       cholecalciferol 50 MCG (2000 UT) tablet Take 1 tablet by mouth daily.       diclofenac (VOLTAREN) 50 MG EC tablet Take 50 mg by mouth 2 times daily.       diphenhydrAMINE-acetaminophen (TYLENOL PM)  MG tablet Take 1 tablet by mouth at bedtime.       dronedarone (MULTAQ) 400 MG TABS tablet Take 1 tablet by mouth every morning.       esomeprazole (NEXIUM) 20 MG DR capsule Take 2 capsules by mouth every morning (before breakfast). Take 30-60 minutes before eating.       acetaminophen (TYLENOL) 500 MG tablet Take 500 mg by mouth every 6 hours as needed for mild pain.       clopidogrel (PLAVIX) 75 MG tablet Take 1 tablet (75 mg) by mouth daily. Please hold for 5 days after surgery (Patient not taking: Reported on 2025)       ketorolac (TORADOL) 10 MG tablet Take 1 tablet (10 mg) by mouth every 6 hours as needed for moderate pain. (Patient not taking: Reported on 2025) 20 tablet 0     metoprolol succinate ER (TOPROL XL) 50 MG 24 hr tablet Take 1 tablet by mouth every morning.         FAMILY HISTORY: Father  in his 40s during the World War II holocaust.  Mother  at age 90 of natural causes and had a history of cardiac disease.  There is a half brother (from mother) age 76 with myeloma and coronary artery disease.  There is 1 son age 60 with hypertension and hyperlipidemia.  There are 2 daughters ages 57 and 54 who are both alive and well.    SOCIAL HISTORY: Patient was born in [place] and moved to [place] at age 12 with his family.  Patient relocated to [place] at age 23 and then moved to [place] in .  He is  " 62 years and lives in [place], [place] with his spouse.  Patient spends 6 months in [place], [place] during the winter.  Patient was employed in textiles import, wholesale, and retail called [employer] from -.  He is currently working in real estate.  He has a 22-pack-year smoking history.  He drinks whiskey or martini daily.  Patient had a tour of duty in the [employer] for 3 years.    Social History     Tobacco Use     Smoking status: Former     Current packs/day: 0.00     Types: Cigarettes     Quit date:      Years since quittin.3     Smokeless tobacco: Never   Substance Use Topics     Alcohol use: Yes     Comment: Sometimes     Drug use: Not Currently       REVIEW OF SYSTEMS: Review of systems reviewed with the patient and otherwise negative except for those detailed above.    PHYSICAL EXAM: BP (!) 148/82   Pulse 63   Temp 97.9  F (36.6  C) (Oral)   Resp 16   Ht 1.727 m (5' 8\")   Wt 93 kg (205 lb)   SpO2 97%   BMI 31.17 kg/m  .  Body surface area is 2.11 meters squared. ECOG performance status: 1.  Skin: No erythema or rash.  HEENT: Sclera nonicteric.  Conjunctiva normal.  Pupils equal round reactive.  Nose clear.  Tongue and uvula midline.  Oropharynx without lesions or ulceration, mucosa pink and moist.  Neck: Supple without masses.  Nodes: No cervical, supraclavicular, axillary, or inguinal adenopathy.  Lungs: No dullness to percussion.  No rales, wheezes, rhonchi.  Heart: Regular rate and rhythm.  Abdomen: Bowel sounds present.  Soft, nontender, no hepatosplenomegaly or mass.  Extremities: No edema.  Neurologic: Sensory, motor, cerebellar normal.     IMPRESSION/PLAN: Stage FRANKIE adenocarcinoma of the prostate.  Prostate cancer is a Wiliam 4+5=9 acinar adenocarcinoma with intraductal carcinoma involving 30% of the prostate, lymphovascular invasion, extraprostatic extension, invasion of the left seminal vesicle, positive surgical margin, but without lymph node " metastases.  Patient underwent definitive surgery (11/14/2024).  There is PSA persistence with postoperative PSA of 0 173 ng/mL (03/24/2025).  There are suspicious PSMA-avid subcentimeter pelvic lymph nodes in the presacral and right pelvic sidewall area detected on restaging PSMA PET/CT scan (04/04/2025). Patient has stage IV prostate cancer and germline cancer gene mutation panel (Inv[employer]ae), and somatic next generation sequencing ([facility]) on the prostatectomy sample (11/14/2024) will be performed to evaluate for mutations and biomarkers that can direct effective therapies.   Radiation oncology consultation (04/18/2025) felt that the suspicious pelvic lymphadenopathy was in close proximity to small bowel precluding definitive radiation therapy.  Patient opted against radiation oncology consultation and [facility].  Patient declined the  phase Ib clinical trial testing xaluritamig for high risk biochemical recurrence of nonmetastatic castration sensitive prostate cancer after definitive therapy.  Low volume metastatic castrate sensitive prostate cancer may be treated with androgen deprivation therapy (ADT) plus androgen receptor pathway inhibitor such as abiraterone (LATITUDE clinical trial, N Engl J Med 2017;377:352-360), enzalutamide (ENZAMET clinical trial, N Engl J Med 2019;381:121-131), or apalutamide (TITAN clinical trial, N Engl J Med 2019;381(1):13-24).  Patient would benefit from ADT consisting of leuprolide 22.5 mg every 3 months plus enzalutamide 160 mg daily, that provides superior progression-free and overall survival compared with ADT plus a nonsteroidal antiandrogen antagonistsin patients with metastatic, hormone-sensitive prostate cancer.  I reviewed the risks and side effects of enzalutamide with the patient, which include fatigue, weakness, flushing, anorexia, weight loss, headache, dizziness, change in taste sensation, breast tenderness and swelling, constipation, diarrhea, peripheral edema,  myalgias, arthralgias, falls, forgetfulness, depression, hypertension, and seizures.  Leuprolide is associated with fatigue, hot flashes, weakness, loss of muscle mass, weight gain, forgetfulness, depression, irritability, breast enlargement, loss of libido, impotence, joint stiffness and pain, coronary artery disease, osteoporosis, and bone fracture.  Patient understood the indication and risks and agreed to proceed. Bicalutamide 50 mg daily x21 days will be initiated at the time of the first leuprolide injection.  Thereafter bicalutamide will be replaced by enzalutamide.  Patient is traveling to [place] tomorrow and will drive back to [place] with his spouse.  Patient wishes to begin therapy after returning to [place] on 05/26/2025.  Patient will return to clinic 05/26/2025, with CBC, metabolic panel, PSA, testosterone and for the first leuprolide dose. The current and past history, clinical evaluation, reviewing diagnostic tests and imaging with the patient, assessment, complex management of enzalutamide/leuprolide toxicities, and planning occurred over 60 minutes.       Polo Webster MD    cc: MD Sandy Echevarria MD Clayton Chen, MD      Again, thank you for allowing me to participate in the care of your patient.        Sincerely,        Polo Webster MD    Electronically signed

## 2025-04-28 NOTE — PROGRESS NOTES
[facility]: Cancer Care Initial Note                                    Discussion with Patient:                                                      Met patient in clinic.  Introduced self and role of RN Care Coordinator at [facility].  Provided contact information, [facility] phone number (which has options to talk with a Triage Nurse available 24/7 - Triage and RNCC via this option during business hours).     Reviewed current clinic flow including telemedicine visits and RNCCs working remotely at varying intervals.  Reviewed appropriate use of MyChart and reinforced to not send symptoms through MyChart.      Reviewed [facility] care team members including midlevel providers in oncology dept and Dr. Webster's usual clinic hours.    Patient voiced understanding and appreciation of above information and denies any further questions, and patient understands that RNCC will follow and provide coordination as needed.    Provided patient with [facility] patient education on Leuprolide, Enzalutamide, Bicalutamide and resources available at the [facility].  Education includes administration, side effects, and ongoing symptom management by APPs in clinic.  Provided phone numbers for triage and after hours care and when to call clinic.  Highlighted steps to expect when getting treatment.  Education includes treatment may be delayed a day or two due to blood counts, infusion schedule, side effects, medications or patient's need to modify.     Goals          General     Other (pt-stated)      Notes - Note created  4/28/2025  9:22 AM by Kaley Matta, RN     Goal Statement: I will use my clinic and care team resources as directed.  Date Goal set: 04/28/25   Barriers: No barriers identified  Strengths: support, coping, motivation, health awareness, and involvement with care team  Date to Achieve By: ongoing  Patient expressed understanding of goal: Yes  Action steps to achieve this  goal:  I will contact triage with new, worsening or uncontrolled symptoms.   I will contact triage with temperature over 100.4  I will call with difficulties of scheduling and/or transportation.   I will request needed refills when there are 7 days of medication remaining.   I will not send urgent or symptomatic messages through [employer].   I will contact scheduling to arrange or make changes in my appointments.   Patient will contact clinic and RNCC as needed ongoing.              Assessment:                                                      Initial  Current living arrangement:: I live in a private home  Type of residence:: Private home - stairs  Informal Support system:: Family  Equipment Currently Used at Home: none  Bed or wheelchair confined:: No  Mobility Status: Independent  Transportation means:: Regular car  Medication adherence problem (GOAL):: No  Knowledgeable about how to use meds:: Yes  Medication side effects suspected:: No  Referrals Placed: None  Pain Score: No Pain (0)    Plan of Care Education Review:   Assessment completed with:: Patient    Plan of Care Education   Yearly learning assessment completed?: Yes (see Education tab)  Diagnosis:: Prostate cancer  Does patient understand diagnosis?: Yes  Tx plan/regimen:: ADT  Does patient understand treatment plan/regimen?: Yes  Preparing for treatment:: Reviewed treatment preparation information with patient (vascular access, day of chemo, visitor policy, what to bring, etc.)  Side effect education:: Diarrhea/Constipation, DVT/PE, Endocrine effects, Fatigue, Immune-mediated effects, Infection, Lab value monitoring (anemia, neutropenia, thrombocytopenia), Nausea/Vomiting  Safety/self care at home reviewed with patient:: Yes  Coping - concerns/fears reviewed with patient:: Yes  Plan of Care:: SMILEY follow-up appointment, Lab appointment, Imaging, MD follow-up appointment, Treatment schedule  When to call provider:: Bleeding, Increased shortness of  breath, New/worsening pain, Shaking chills, Temperature >100.4F, Uncontrolled diarrhea/constipation, Uncontrolled nausea/vomiting    Evaluation of Learning  Patient Education Provided: Yes  Readiness:: Acceptance  Method:: Booklet/Handout, Literature, Explanation  Response:: Verbalizes understanding         Intervention/Education provided during outreach:                                                       Patient to follow up as scheduled at next appt  Patient to call/[employer] message with updates  Confirmed patient has clinic and triage numbers    Kaley ESQUIVEL RN  Cancer Care Coordinator  [facility]

## 2025-04-28 NOTE — LETTER
4/28/2025      Mushtaq Olsen  [address]  [place] [address]    Walter Landin,    My name is Kaley and I am the RN Care Coordinator who works with Dr. Webster's patients.  I am here to help support your oncology care, provide communication, and answer questions between your appointments with Dr. Webster.      The best way to get a hold of me is through [employer].  I typically respond within the same day or by the following business day.  You will select Dr. Webster when choosing to send a message and the message will be routed to me.      The main phone number for the [facility] is [phone] option 5, option 2.      - Ask to speak to the scheduling team if you need to: schedule, reschedule, or cancel appointments, or if you have questions about your appointments.    - Ask to speak to the triage nursing team if: you are having any new, acute, or worsening symptoms.  They are here to assist you 24/7 and will be able to help you in real time.  You can also ask to speak to the triage nurse if you need refills on your medications.    - Ask to speak to me (Kaley) if: you have general, non-urgent, questions about your care.  If you get my confidential voicemail, you can leave a detailed message.  I typically respond within 1-2 business days.      Kaley RN Care Coordinator  [facility] [address] [address]  [phone] Option 5, Option 2

## 2025-04-28 NOTE — PROGRESS NOTES
[facility]: Cancer Care                                                                                          Submitted [employer]ae requisition requesting kit to be sent to patient's home in [place].    Confirmed address with patient in clinic and informed patient will send [employer] message with [employer] instructions.    Submitted [facility] requisition on the prostatectomy sample 11/14/2024, (Case: [id number]).    Kaley ESQUIVEL RN  Cancer Care Coordinator  [facility]

## 2025-04-28 NOTE — LETTER
Walter Landin,    Here is the information regarding the prostate cancer regimen that Dr. Webster is recommending for you.  I have included handouts on each prostate cancer drug that lists the most common and possible side effects.  I have also included tip sheets on preparing yourself to start prostate cancer treatments, managing possible side effects, When to Call, important contact information for our clinic, and various resources and services for cancer patients.    Prostate Cancer Treatment Information    Prostate Cancer Regimen:   Start Bicalutamide daily x21 days.  Leuprolide injections every 3 months.   Bicalutamide to be replaced by Enzalutamide.    You will have labs checked before each prostate cancer treatment, to ensure that it is safe for you to receive treatment that day.  You will also have a visit (either in-person or virtual) with Dr. Webster or our Oncology Advanced Practice Provider (SMI[facility]) approximately every 6 weeks throughout your prostate cancer treatment, to assess how things are going for you.    If you develop any new, acute, or worsening symptoms, please call [phone] option 5, option 2 and ask to speak to our triage team.  Simply let the person on the other line know that you need to speak with a triage nurse about the symptoms you are having. They are here to assist you during business hours and will be able to help you in real time.  For any urgent symptoms that occur after hours, weekends, and holidays, that number will get you to our on-call nurse and doctor who can assist you.  You can also ask to speak to the Triage Nurse if you need refills on your medications.    You will be receiving a telephone call from a member of our scheduling team to assist you with scheduling your appointments.  Please don't hesitate to call us with any questions or concerns in the meantime.    FERCHO Roche Care Coordinator  [facility] [address] [address]  [phone] Option 5,  Option 2

## 2025-04-28 NOTE — NURSING NOTE
"Oncology Rooming Note    April 28, 2025 8:19 AM   Mushtaq Olsen is a 85 year old male who presents for:    Chief Complaint   Patient presents with    Oncology Clinic Visit     Prostate Ca     Initial Vitals: BP (!) 148/82   Pulse 63   Temp 97.9  F (36.6  C) (Oral)   Resp 16   Ht 1.727 m (5' 8\")   Wt 93 kg (205 lb)   SpO2 97%   BMI 31.17 kg/m   Estimated body mass index is 31.17 kg/m  as calculated from the following:    Height as of this encounter: 1.727 m (5' 8\").    Weight as of this encounter: 93 kg (205 lb). Body surface area is 2.11 meters squared.  No Pain (0) Comment: Data Unavailable   No LMP for male patient.  Allergies reviewed: Yes  Medications reviewed: Yes    Medications: Medication refills not needed today.  Pharmacy name entered into [facility]: [facility] [address] [place] [id number] [facility]    Frailty Screening:   Is the patient here for a new oncology consult visit in cancer care? 1. Yes. Over the past month, have you experienced difficulty or required a caregiver to assist with:   1. Balance, walking or general mobility (including any falls)? NO  2. Completion of self-care tasks such as bathing, dressing, toileting, grooming/hygiene?  NO  3. Concentration or memory that affects your daily life?  NO     PHQ9:  Did this patient require a PHQ9?: No      Clinical concerns:        Latosha Merchant CMA              "

## 2025-04-28 NOTE — LETTER
Walter Landin,    Here is the information regarding the prostate cancer regimen that Dr. Webster is recommending for you.  I have included handouts on each prostate cancer drug that lists the most common and possible side effects.  I have also included tip sheets on preparing yourself to start prostate cancer treatments, managing possible side effects, When to Call, important contact information for our clinic, and various resources and services for cancer patients.    Prostate Cancer Treatment Information    Prostate Cancer Regimen:   Start Bicalutamide daily x21 days.  Leuprolide injections every 3 months.   Bicalutamide to be replaced by Enzalutamide.    You will have labs checked before each prostate cancer treatment, to ensure that it is safe for you to receive treatment that day.  You will also have a visit (either in-person or virtual) with Dr. Webster or our Oncology Advanced Practice Provider (SMILEY) approximately every 30 days throughout your prostate cancer treatment, to assess how things are going for you.    If you develop any new, acute, or worsening symptoms, please call [phone] option 5, option 2 and ask to speak to our triage team.  Simply let the person on the other line know that you need to speak with a triage nurse about the symptoms you are having. They are here to assist you during business hours and will be able to help you in real time.  For any urgent symptoms that occur after hours, weekends, and holidays, that number will get you to our on-call nurse and doctor who can assist you.  You can also ask to speak to the Triage Nurse if you need refills on your medications.    You will be receiving a telephone call from a member of our scheduling team to assist you with scheduling your appointments.  Please don't hesitate to call us with any questions or concerns in the meantime.    FERCHO Roche Care Coordinator  [facility] [address] [address]  [phone] Option 5,  Option 2

## 2025-04-30 ENCOUNTER — TELEPHONE (OUTPATIENT)
Dept: ONCOLOGY | Facility: CLINIC | Age: 85
End: 2025-04-30
Payer: MEDICARE

## 2025-04-30 NOTE — TELEPHONE ENCOUNTER
PA Initiation    Medication: ABIRATERONE ACETATE 250 MG PO TABS  Insurance Company: [employer] - Phone [phone] Fax [phone]  Pharmacy Filling the Rx:    Filling Pharmacy Phone:    Filling Pharmacy Fax:    Start Date: 4/30/2025        Katiuska Blanton CPhTOncology Pharmacy [facility] [address] [address]  Office: [phone]  Fax: [phone]  [email or web address]

## 2025-04-30 NOTE — TELEPHONE ENCOUNTER
Prior Authorization Approval    Medication: ABIRATERONE ACETATE 250 MG PO TABS  Authorization Effective Date: 4/30/2025  Authorization Expiration Date: 12/31/2025  Approved Dose/Quantity:   Reference #: [id number]   Insurance Company: [employer] [phone] Fax [phone]  Expected CoPay: $ 150  CoPay Card Available: No    Financial Assistance Needed:   Which Pharmacy is filling the prescription: [facility] - [place], [place] - [address] [place] [address]  Pharmacy Notified:   Patient Notified:         Katiuska Blanton CPhTOncology Pharmacy [facility]  [address] [place] [id number]  Office: [phone]  Fax: [phone]  [email or web address]

## 2025-05-01 ENCOUNTER — PATIENT OUTREACH (OUTPATIENT)
Dept: ONCOLOGY | Facility: CLINIC | Age: 85
End: 2025-05-01
Payer: MEDICARE

## 2025-05-01 NOTE — PROGRESS NOTES
[facility]: Cancer Care                                                                                      Writer spoke with patient's wife Mima this morning to review the plan for this patient.  Advised her that it is ok for them to  the bicalutamide now, but Mushtaq should wait to start it until 5/19 (a week prior to the scheduled leuprolide injection).  Instructions provided that he should take bicalutamide once daily for 21 days.  She repeated the instructions back.    Nisha Alex, RN, BSN, OCN  Oncology RN Care Coordinator  [facility]

## 2025-05-16 NOTE — PROGRESS NOTES
PRIMARY CARE PHYSICIAN: Saurabh Fuller MD  UROLOGY: Sandy Kumari MD  RADIATION ONCOLOGY: Guanaco Olsen MD    HISTORY OF PRESENT ILLNESS: Patient is an 85-year-old male with stage FRANKIE adenocarcinoma of the prostate (pT3b, cN1, cM0, PSA 5.36 ng/mL, grade group 5).  Patient was diagnosed with stage IIIC adenocarcinoma of the prostate (pT3b, pN0, cM0, PSA 5.36 ng/mL, grade group 5) and presented in urology clinic 06/25/2024, with urinary frequency, decreased force of stream, nocturia 4-5 times per night, left prostate nodule on digital rectal exam, and PSA 5.36 ng/mL (08/01/2024). MRI prostate 07/29/2024, revealed estimated prostate size of 43 g.  There was a 10 mm ill-defined T2 hypointense lesion with focal DWI diffusion restriction in the 5:00 position of the left mid gland peripheral zone with 6/15 mm capsular abutment (PI-RADS 4) labeled as lesion 1.  There was no neurovascular bundle involvement.  There was no seminal vesicle involvement.  There was no lymphadenopathy or suspicious bone lesions.  There were postsurgical changes related to prior TURP and the transition zone with circumscribed transition zone nodules without diffusion restriction (PI-RADS 2). Cystoscopy 08/27/2024, was negative for bladder obstruction or urethral stricture, and the bladder was unremarkable. UroNav-guided MRI-ultrasound fusion prostate core needle biopsy 09/13/2024, revealed a [place] 4+5=9 adenocarcinoma involving 15% of 1 of 2 core needle biopsy samples from the right base, 20% of 1 of 2 biopsy samples from the right mid, and 70% of 2 of 2 biopsy samples from target lesion 1 in the left mid peripheral zone.  There was a Wiliam grade 4+4=8 adenocarcinoma involving 30% of 1 of 2 core needle biopsy samples from the left base, 30% of 2 of 2 biopsy samples with perineural invasion and suspicion of vascular invasion from the left mid, and 20% of 2 of 2 biopsy samples from the left apex.  There was benign prostatic tissue in 2 of 2 core  needle biopsy samples from the right apex (9/14 biopsy samples involved). 68-Ga PSMA-11 PET/CT scan 10/03/2024, revealed prostate enlargement measuring 6 x 4.3 cm with focal PSMA uptake along the left mid peripheral zone with SUV max 7.3.  There was an additional small focus of PSMA uptake in the right lateral peripheral zone with SUV max 7.4).  There was linear intense PSMA avidity in the prostate corresponding to the TURP defect.  There were no suspicious regional or distant PSMA-avid lesions.     Patient underwent robotic-assisted laparoscopic prostatectomy and bilateral pelvic lymph node dissection 11/14/2024, revealed a Wiliam 4+5=9 acinar adenocarcinoma involving 30% of the right to left posterolateral mid to base of the gland. There was intraductal carcinoma and cribriform glands present.  There was lymphovascular invasion.  There was multifocal extraprostatic extension involving the left posterolateral mid gland.  There was invasion of the left seminal vesicle.  There was no bladder neck invasion.  There was unifocal 0.1 cm involvement of the left posterior lateral mid margin at the site of extraprostatic extension. Three regional lymph nodes were negative for metastases (0/3 lymph nodes involved).  Pathology was reviewed at the [facility]. Postoperative PSA was 0.173 ng/mL (03/24/2025).  Restaging 68-Ga PSMA-11 PET/CT scan 04/04/2025, revealed postsurgical changes related to prior prostatectomy with focal PSMA uptake in the surgical bed.  There were few mildly PSMA avid subcentimeter pelvic regional lymph nodes including a 0.4 cm left presacral lymph node with SUV 1.4 that was unchanged, a 0.3 cm left presacral lymph node with SUV 2.6 that was unchanged, a 0.4 cm right pelvic sidewall lymph node with SUV 2.3 (previously 0.2 cm).  There were no other PSMA-avid lesions. 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  Radiation oncology consultation 04/18/2025, determine that there was suspicious pelvic lymphadenopathy, some of which were in close proximity to small bowel precluding definitive radiation therapy.  Patient opted against radiation oncology consultation in [place].  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  Patient is receiving androgen deprivation therapy (ADT) consisting of leuprolide 22.5 mg every 3 months beginning 05/28/2025 and abiraterone 1000 mg daily on an empty stomach plus prednisone 5 mg daily with breakfast. Patient has urinary frequency, urgency, and nocturia 3 times per night.  The urinary stress incontinence resolved.  Patient has chronic low back pain due to lumbar radiculopathy, which limits his ability to walk long distances. There are no other new symptoms or events since the prior clinic visit (04/28/2025).  Patient exercises in the pool regularly and he walks the dog.  He denies fever, anorexia, weight loss, headache, cough, dyspnea, chest pain, abdominal pain, nausea, vomiting, constipation, diarrhea, bleeding, or bone pain.     PAST HISTORY: Past history was reviewed and unchanged from the clinic note 04/28/2025.     MEDICATIONS:   Current Outpatient Medications   Medication Sig Dispense Refill    acetaminophen (TYLENOL) 500 MG tablet Take 500 mg by mouth every 6 hours as needed for mild pain.      allopurinol (ZYLOPRIM) 100 MG tablet Take 100 mg by mouth every morning.      apixaban ANTICOAGULANT (ELIQUIS) 5 MG tablet Take 1 tablet (5 mg) by mouth 2 times daily. Please hold for 5 days after surgery      atorvastatin (LIPITOR) 40 MG tablet Take 1 tablet by mouth at bedtime.      bicalutamide (CASODEX) 50 MG tablet Take 1 tablet (50 mg) by mouth daily for 21 days. Begin 05/19/2025. 21 tablet 0    cholecalciferol 50 MCG (2000 UT) tablet Take 1 tablet by mouth daily.      clopidogrel (PLAVIX) 75 MG tablet Take 1 tablet (75 mg) by mouth daily. Please hold  "for 5 days after surgery (Patient not taking: Reported on 4/28/2025)      diclofenac (VOLTAREN) 50 MG EC tablet Take 50 mg by mouth 2 times daily.      diphenhydrAMINE-acetaminophen (TYLENOL PM)  MG tablet Take 1 tablet by mouth at bedtime.      dronedarone (MULTAQ) 400 MG TABS tablet Take 1 tablet by mouth every morning.      esomeprazole (NEXIUM) 20 MG DR capsule Take 2 capsules by mouth every morning (before breakfast). Take 30-60 minutes before eating.      ketorolac (TORADOL) 10 MG tablet Take 1 tablet (10 mg) by mouth every 6 hours as needed for moderate pain. (Patient not taking: Reported on 4/28/2025) 20 tablet 0    metoprolol succinate ER (TOPROL XL) 50 MG 24 hr tablet Take 1 tablet by mouth every morning.         REVIEW OF SYSTEMS: Review of systems reviewed with the patient and otherwise negative except for those detailed above.    PHYSICAL EXAM: BP (!) 159/82 (BP Location: Left arm, Patient Position: Sitting, Cuff Size: Adult Large)   Pulse 80   Temp 98.3  F (36.8  C) (Oral)   Resp 16   Ht 1.727 m (5' 7.99\")   Wt 94.8 kg (209 lb 1.6 oz)   SpO2 95%   BMI 31.80 kg/m  .  ECOG performance status: 1.  Physical exam was unchanged from prior clinic visit 04/28/2025.  Skin: No erythema or rash.  HEENT: Sclera nonicteric. Oropharynx without lesions or ulceration, mucosa pink and moist.  Nodes: No cervical, supraclavicular, axillary, or inguinal adenopathy.  Lungs: No dullness to percussion.  No rales, wheezes, rhonchi.  Heart: Regular rate and rhythm.  Abdomen: Bowel sounds present.  Soft, nontender, no hepatosplenomegaly or mass.  Extremities: No edema.      LABORATORY REVIEWED:  Component      Latest Ref Rng 8/1/2024  12:17 PM 5/28/2025  3:09 PM   WBC      4.0 - 11.0 10e3/uL  7.1    RBC Count      4.40 - 5.90 10e6/uL  4.68    Hemoglobin      13.3 - 17.7 g/dL  14.6    Hematocrit      40.0 - 53.0 %  44.1    MCV      78 - 100 fL  94    MCH      26.5 - 33.0 pg  31.2    MCHC      31.5 - 36.5 g/dL  33.1  "   RDW      10.0 - 15.0 %  14.6    Platelet Count      150 - 450 10e3/uL  143 (L)    % Neutrophils      %  66    % Lymphocytes      %  18    % Monocytes      %  9    % Eosinophils      %  6    % Basophils      %  1    % Immature Granulocytes      %  0    NRBC/W      <1 /100  0    Absolute Neutrophil      1.6 - 8.3 10e3/uL  4.7    Absolute Lymphocytes      0.8 - 5.3 10e3/uL  1.3    Absolute Monocytes      0.0 - 1.3 10e3/uL  0.6    Absolute Eosinophils      0.0 - 0.7 10e3/uL  0.5    Absolute Basophils      0.0 - 0.2 10e3/uL  0.0    Absolute Immature Granulocytes      <=0.4 10e3/uL  0.0    Absolute NRBCs      10e3/uL  0.0    Sodium      135 - 145 mmol/L  139    Potassium      3.4 - 5.3 mmol/L  4.8    Carbon Dioxide (CO2)      22 - 29 mmol/L  24    Anion Gap      7 - 15 mmol/L  13    Urea Nitrogen      8.0 - 23.0 mg/dL  24.1 (H)    Creatinine      0.67 - 1.17 mg/dL  1.15    GFR Estimate      >60 mL/min/1.73m2  63    Calcium      8.8 - 10.4 mg/dL  9.4    Chloride      98 - 107 mmol/L  102    Glucose      70 - 99 mg/dL  101 (H)    Alkaline Phosphatase      40 - 150 U/L  129    AST      0 - 45 U/L  29    ALT      0 - 70 U/L  25    Protein Total      6.4 - 8.3 g/dL  7.2    Albumin      3.5 - 5.2 g/dL  4.6    Bilirubin Total      <=1.2 mg/dL  0.4    PSA Tumor Marker      ng/mL 5.36  0.58        Component  Ref Range & Units 2 mo ago   PSA, Ultrasensitive  0.0 - 4.0 ng/mL 0.793   Comment: Patient results determined by assays using different manufacturers or methods may not be comparable. This test is performed on the Roche yonatan e platform using an Electrochemiluminescence immunoassay (ECLIA).   [facility]     Specimen Collected: 03/24/25  9:44 AM     Testosterone pending.    IMPRESSION/PLAN: Stage FRANKIE adenocarcinoma of the prostate.  Prostate cancer is a Wiliam 4+5=9 acinar adenocarcinoma with intraductal carcinoma involving 30% of the prostate, lymphovascular invasion, extraprostatic extension,  invasion of the left seminal vesicle, positive surgical margin, but without lymph node metastases.  Patient underwent definitive surgery (11/14/2024).  There is PSA persistence with postoperative PSA of 0 173 ng/mL (03/24/2025).  There are suspicious PSMA-avid subcentimeter pelvic lymph nodes in the presacral and right pelvic sidewall area detected on restaging PSMA PET/CT scan (04/04/2025). Patient has stage IV prostate cancer and germline cancer gene mutation panel ([employer]), and somatic next generation sequencing ([employer]) on the prostatectomy sample (11/14/2024) will be performed to evaluate for mutations and biomarkers that can direct effective therapies.   Radiation oncology consultation (04/18/2025) felt that the suspicious pelvic lymphadenopathy was in close proximity to small bowel precluding definitive radiation therapy.  Patient opted against radiation oncology consultation in [place].  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  Enzalutamide, apalutamide, and darolutamide are associated with multiple drug interactions, in particular with dronedarone and apixaban, which makes monitoring of these medications and their therapeutic effects exquisitely difficult, and patient is receiving consisting of leuprolide 22.5 mL every 3 months (beginning 05/28/2025) and abiraterone 1000 mg daily plus prednisone 5 mg daily. I reviewed the risk and side effects of abiraterone with the patient, which is associated with fatigue, hot flashes, fever, cognitive difficulty, depression, rash, abdominal pain, nausea,  vomiting, diarrhea, constipation, myalgias, arthralgias, edema, falls, fractures, adrenal insufficiency, hypertension, arrhythmia, myocardial infarction, heart failure, hepatotoxicity, hyperlipidemia, anemia.  Leuprolide is associated with fatigue, hot flashes, weakness, loss of muscle mass, weight gain, forgetfulness, depression, irritability, breast enlargement, loss of libido, impotence, joint stiffness and pain, coronary artery disease, osteoporosis, and bone fracture.  Patient understood the indication and risks and agreed to proceed.  Bicalutamide 50 mg daily x21 days will be initiated at the time of the first leuprolide injection.  Thereafter bicalutamide will be replaced by abiraterone/prednisone.  Patient will return to clinic in 6 weeks with CBC, metabolic panel, PSA, testosterone. The current and past history, clinical evaluation, reviewing diagnostic tests and imaging with the patient, assessment, complex management of abiraterone/leuprolide toxicities, and planning occurred over 30 minutes.       Polo Webster MD    cc: MD Sandy Echevarria MD Clayton Chen, MD

## 2025-05-21 ENCOUNTER — RESULTS FOLLOW-UP (OUTPATIENT)
Dept: LAB | Facility: CLINIC | Age: 85
End: 2025-05-21

## 2025-05-21 LAB — LAB ORDER RESULT STATUS: NORMAL

## 2025-05-22 ENCOUNTER — PATIENT OUTREACH (OUTPATIENT)
Dept: ONCOLOGY | Facility: CLINIC | Age: 85
End: 2025-05-22
Payer: MEDICARE

## 2025-05-22 NOTE — PROGRESS NOTES
[facility]: Cancer Care                                                                                      Form received from Lillie requesting missing information from requisition.  Provided them with the hospital name and discharge date of when the specimen was collected. Faxed to provided # [phone].    Nisha Alex, RN, BSN, OCN  Oncology RN Care Coordinator  [facility]

## 2025-05-28 ENCOUNTER — LAB (OUTPATIENT)
Dept: LAB | Facility: CLINIC | Age: 85
End: 2025-05-28
Payer: MEDICARE

## 2025-05-28 ENCOUNTER — ONCOLOGY VISIT (OUTPATIENT)
Dept: ONCOLOGY | Facility: CLINIC | Age: 85
End: 2025-05-28
Attending: INTERNAL MEDICINE
Payer: MEDICARE

## 2025-05-28 VITALS
SYSTOLIC BLOOD PRESSURE: 159 MMHG | WEIGHT: 209.1 LBS | DIASTOLIC BLOOD PRESSURE: 82 MMHG | RESPIRATION RATE: 16 BRPM | TEMPERATURE: 98.3 F | HEIGHT: 68 IN | BODY MASS INDEX: 31.69 KG/M2 | HEART RATE: 80 BPM | OXYGEN SATURATION: 95 %

## 2025-05-28 DIAGNOSIS — C61 PROSTATE CANCER (H): ICD-10-CM

## 2025-05-28 DIAGNOSIS — C61 PROSTATE CANCER (H): Primary | ICD-10-CM

## 2025-05-28 LAB
ALBUMIN SERPL BCG-MCNC: 4.6 G/DL (ref 3.5–5.2)
ALP SERPL-CCNC: 129 U/L (ref 40–150)
ALT SERPL W P-5'-P-CCNC: 25 U/L (ref 0–70)
ANION GAP SERPL CALCULATED.3IONS-SCNC: 13 MMOL/L (ref 7–15)
AST SERPL W P-5'-P-CCNC: 29 U/L (ref 0–45)
BASOPHILS # BLD AUTO: 0 10E3/UL (ref 0–0.2)
BASOPHILS NFR BLD AUTO: 1 %
BILIRUB SERPL-MCNC: 0.4 MG/DL
BUN SERPL-MCNC: 24.1 MG/DL (ref 8–23)
CALCIUM SERPL-MCNC: 9.4 MG/DL (ref 8.8–10.4)
CHLORIDE SERPL-SCNC: 102 MMOL/L (ref 98–107)
CREAT SERPL-MCNC: 1.15 MG/DL (ref 0.67–1.17)
EGFRCR SERPLBLD CKD-EPI 2021: 63 ML/MIN/1.73M2
EOSINOPHIL # BLD AUTO: 0.5 10E3/UL (ref 0–0.7)
EOSINOPHIL NFR BLD AUTO: 6 %
ERYTHROCYTE [DISTWIDTH] IN BLOOD BY AUTOMATED COUNT: 14.6 % (ref 10–15)
GLUCOSE SERPL-MCNC: 101 MG/DL (ref 70–99)
HCO3 SERPL-SCNC: 24 MMOL/L (ref 22–29)
HCT VFR BLD AUTO: 44.1 % (ref 40–53)
HGB BLD-MCNC: 14.6 G/DL (ref 13.3–17.7)
IMM GRANULOCYTES # BLD: 0 10E3/UL
IMM GRANULOCYTES NFR BLD: 0 %
LYMPHOCYTES # BLD AUTO: 1.3 10E3/UL (ref 0.8–5.3)
LYMPHOCYTES NFR BLD AUTO: 18 %
MCH RBC QN AUTO: 31.2 PG (ref 26.5–33)
MCHC RBC AUTO-ENTMCNC: 33.1 G/DL (ref 31.5–36.5)
MCV RBC AUTO: 94 FL (ref 78–100)
MONOCYTES # BLD AUTO: 0.6 10E3/UL (ref 0–1.3)
MONOCYTES NFR BLD AUTO: 9 %
NEUTROPHILS # BLD AUTO: 4.7 10E3/UL (ref 1.6–8.3)
NEUTROPHILS NFR BLD AUTO: 66 %
NRBC # BLD AUTO: 0 10E3/UL
NRBC BLD AUTO-RTO: 0 /100
PLATELET # BLD AUTO: 143 10E3/UL (ref 150–450)
POTASSIUM SERPL-SCNC: 4.8 MMOL/L (ref 3.4–5.3)
PROT SERPL-MCNC: 7.2 G/DL (ref 6.4–8.3)
PSA SERPL DL<=0.01 NG/ML-MCNC: 0.58 NG/ML
RBC # BLD AUTO: 4.68 10E6/UL (ref 4.4–5.9)
SODIUM SERPL-SCNC: 139 MMOL/L (ref 135–145)
WBC # BLD AUTO: 7.1 10E3/UL (ref 4–11)

## 2025-05-28 PROCEDURE — 80053 COMPREHEN METABOLIC PANEL: CPT | Performed by: PATHOLOGY

## 2025-05-28 PROCEDURE — 85025 COMPLETE CBC W/AUTO DIFF WBC: CPT | Performed by: PATHOLOGY

## 2025-05-28 PROCEDURE — 84153 ASSAY OF PSA TOTAL: CPT | Performed by: PATHOLOGY

## 2025-05-28 PROCEDURE — 36415 COLL VENOUS BLD VENIPUNCTURE: CPT | Performed by: PATHOLOGY

## 2025-05-28 PROCEDURE — 99000 SPECIMEN HANDLING OFFICE-LAB: CPT | Performed by: PATHOLOGY

## 2025-05-28 PROCEDURE — G0463 HOSPITAL OUTPT CLINIC VISIT: HCPCS | Performed by: INTERNAL MEDICINE

## 2025-05-28 PROCEDURE — 250N000011 HC RX IP 250 OP 636: Mod: JZ | Performed by: INTERNAL MEDICINE

## 2025-05-28 PROCEDURE — 84403 ASSAY OF TOTAL TESTOSTERONE: CPT | Performed by: INTERNAL MEDICINE

## 2025-05-28 RX ORDER — PREDNISONE 5 MG/1
5 TABLET ORAL
Qty: 90 TABLET | Refills: 3 | Status: SHIPPED | OUTPATIENT
Start: 2025-05-28

## 2025-05-28 RX ADMIN — LEUPROLIDE ACETATE 22.5 MG: 22.5 INJECTION, SUSPENSION, EXTENDED RELEASE SUBCUTANEOUS at 16:08

## 2025-05-28 ASSESSMENT — PAIN SCALES - GENERAL: PAINLEVEL_OUTOF10: NO PAIN (0)

## 2025-05-28 NOTE — NURSING NOTE
Eligard given into RIGHT lower abdomen without incident. Pt tolerated well.    This is new treatment for patient today. Medication reviewed with patient and wife, all questions answered. Sent home with printed education information.    Administrations This Visit       leuprolide (3 Month) (ELIGARD) injection 22.5 mg       Admin Date  05/28/2025 Action  $Given Dose  22.5 mg Route  Subcutaneous Documented By  Lucie Akbar, RN                    Lucie Akbar RN on 5/28/2025 at 4:14 PM

## 2025-05-28 NOTE — NURSING NOTE
"Oncology Rooming Note    May 28, 2025 3:24 PM   Mushtaq Olsen is a 85 year old male who presents for:    Chief Complaint   Patient presents with    Oncology Clinic Visit     Prostate cancer     Initial Vitals: BP (!) 169/97 (BP Location: Right arm, Patient Position: Sitting, Cuff Size: Adult Large)   Pulse 80   Temp 98.3  F (36.8  C) (Oral)   Resp 20   Wt 94.8 kg (209 lb 1.6 oz)   SpO2 95%   BMI 31.79 kg/m   Estimated body mass index is 31.79 kg/m  as calculated from the following:    Height as of 4/28/25: 1.727 m (5' 8\").    Weight as of this encounter: 94.8 kg (209 lb 1.6 oz). Body surface area is 2.13 meters squared.  No Pain (0) Comment: Data Unavailable   No LMP for male patient.  Allergies reviewed: Yes  Medications reviewed: Yes    Medications: Medication refills not needed today.  Pharmacy name entered into [facility]:    [id number] - [place], [place] - [address] [facility] - [place], [place] - [address] [place] [address]    Frailty Screening:   Is the patient here for a new oncology consult visit in cancer care? 2. No    PHQ9:  Did this patient require a PHQ9?: No      Clinical concerns: Wondering if recent refill on a medication was sent to the [facility] pharmacy; Has not heard any results from saliva test       Harper Wilson              "

## 2025-05-28 NOTE — LETTER
5/28/2025      Mushtaq Olsen  [address] [place]  [place] [address]      Dear Colleague,    Thank you for referring your patient, Mushtaq Olsen, to the [facility]. Please see a copy of my visit note below.      PRIMARY CARE PHYSICIAN: Saurabh Fuller MD  UROLOGY: Sandy Kumari MD  RADIATION ONCOLOGY: Guanaco Olsen MD    HISTORY OF PRESENT ILLNESS: Patient is an 85-year-old male with stage FRANKIE adenocarcinoma of the prostate (pT3b, cN1, cM0, PSA 5.36 ng/mL, grade group 5).  Patient was diagnosed with stage IIIC adenocarcinoma of the prostate (pT3b, pN0, cM0, PSA 5.36 ng/mL, grade group 5) and presented in urology clinic 06/25/2024, with urinary frequency, decreased force of stream, nocturia 4-5 times per night, left prostate nodule on digital rectal exam, and PSA 5.36 ng/mL (08/01/2024). MRI prostate 07/29/2024, revealed estimated prostate size of 43 g.  There was a 10 mm ill-defined T2 hypointense lesion with focal DWI diffusion restriction in the 5:00 position of the left mid gland peripheral zone with 6/15 mm capsular abutment (PI-RADS 4) labeled as lesion 1.  There was no neurovascular bundle involvement.  There was no seminal vesicle involvement.  There was no lymphadenopathy or suspicious bone lesions.  There were postsurgical changes related to prior TURP and the transition zone with circumscribed transition zone nodules without diffusion restriction (PI-RADS 2). Cystoscopy 08/27/2024, was negative for bladder obstruction or urethral stricture, and the bladder was unremarkable. UroNav-guided MRI-ultrasound fusion prostate core needle biopsy 09/13/2024, revealed a Wiliam 4+5=9 adenocarcinoma involving 15% of 1 of 2 core needle biopsy samples from the right base, 20% of 1 of 2 biopsy samples from the right mid, and 70% of 2 of 2 biopsy samples from target lesion 1 in the left mid peripheral zone.  There was a Wiliam grade 4+4=8 adenocarcinoma involving 30% of 1 of 2 core  needle biopsy samples from the left base, 30% of 2 of 2 biopsy samples with perineural invasion and suspicion of vascular invasion from the left mid, and 20% of 2 of 2 biopsy samples from the left apex.  There was benign prostatic tissue in 2 of 2 core needle biopsy samples from the right apex (9/14 biopsy samples involved). 68-Ga PSMA-11 PET/CT scan 10/03/2024, revealed prostate enlargement measuring 6 x 4.3 cm with focal PSMA uptake along the left mid peripheral zone with SUV max 7.3.  There was an additional small focus of PSMA uptake in the right lateral peripheral zone with SUV max 7.4).  There was linear intense PSMA avidity in the prostate corresponding to the TURP defect.  There were no suspicious regional or distant PSMA-avid lesions.     Patient underwent robotic-assisted laparoscopic prostatectomy and bilateral pelvic lymph node dissection 11/14/2024, revealed a Wiliam 4+5=9 acinar adenocarcinoma involving 30% of the right to left posterolateral mid to base of the gland. There was intraductal carcinoma and cribriform glands present.  There was lymphovascular invasion.  There was multifocal extraprostatic extension involving the left posterolateral mid gland.  There was invasion of the left seminal vesicle.  There was no bladder neck invasion.  There was unifocal 0.1 cm involvement of the left posterior lateral mid margin at the site of extraprostatic extension. Three regional lymph nodes were negative for metastases (0/3 lymph nodes involved).  Pathology was reviewed at the [facility]. Postoperative PSA was 0.173 ng/mL (03/24/2025).  Restaging 68-Ga PSMA-11 PET/CT scan 04/04/2025, revealed postsurgical changes related to prior prostatectomy with focal PSMA uptake in the surgical bed.  There were few mildly PSMA avid subcentimeter pelvic regional lymph nodes including a 0.4 cm left presacral lymph node with SUV 1.4 that was unchanged, a 0.3 cm left presacral lymph node with SUV 2.6 that was  unchanged, a 0.4 cm right pelvic sidewall lymph node with SUV 2.3 (previously 0.2 cm).  There were no other PSMA-avid lesions. 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  Radiation oncology consultation 04/18/2025, determine that there was suspicious pelvic lymphadenopathy, some of which were in close proximity to small bowel precluding definitive radiation therapy.  Patient opted against radiation oncology consultation in [place].  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  Patient is receiving androgen deprivation therapy (ADT) consisting of leuprolide 22.5 mg every 3 months beginning 05/28/2025 and abiraterone 1000 mg daily on an empty stomach plus prednisone 5 mg daily with breakfast. Patient has urinary frequency, urgency, and nocturia 3 times per night.  The urinary stress incontinence resolved.  Patient has chronic low back pain due to lumbar radiculopathy, which limits his ability to walk long distances. There are no other new symptoms or events since the prior clinic visit (04/28/2025).  Patient exercises in the pool regularly and he walks the dog.  He denies fever, anorexia, weight loss, headache, cough, dyspnea, chest pain, abdominal pain, nausea, vomiting, constipation, diarrhea, bleeding, or bone pain.     PAST HISTORY: Past history was reviewed and unchanged from the clinic note 04/28/2025.     MEDICATIONS:   Current Outpatient Medications   Medication Sig Dispense Refill     acetaminophen (TYLENOL) 500 MG tablet Take 500 mg by mouth every 6 hours as needed for mild pain.       allopurinol (ZYLOPRIM) 100 MG tablet Take 100 mg by mouth every morning.       apixaban ANTICOAGULANT (ELIQUIS) 5 MG tablet Take 1 tablet (5 mg) by mouth 2 times daily. Please hold for 5 days after surgery       atorvastatin (LIPITOR) 40 MG tablet Take 1 tablet by mouth at bedtime.       bicalutamide  "(CASODEX) 50 MG tablet Take 1 tablet (50 mg) by mouth daily for 21 days. Begin 05/19/2025. 21 tablet 0     cholecalciferol 50 MCG (2000 UT) tablet Take 1 tablet by mouth daily.       clopidogrel (PLAVIX) 75 MG tablet Take 1 tablet (75 mg) by mouth daily. Please hold for 5 days after surgery (Patient not taking: Reported on 4/28/2025)       diclofenac (VOLTAREN) 50 MG EC tablet Take 50 mg by mouth 2 times daily.       diphenhydrAMINE-acetaminophen (TYLENOL PM)  MG tablet Take 1 tablet by mouth at bedtime.       dronedarone (MULTAQ) 400 MG TABS tablet Take 1 tablet by mouth every morning.       esomeprazole (NEXIUM) 20 MG DR capsule Take 2 capsules by mouth every morning (before breakfast). Take 30-60 minutes before eating.       ketorolac (TORADOL) 10 MG tablet Take 1 tablet (10 mg) by mouth every 6 hours as needed for moderate pain. (Patient not taking: Reported on 4/28/2025) 20 tablet 0     metoprolol succinate ER (TOPROL XL) 50 MG 24 hr tablet Take 1 tablet by mouth every morning.         REVIEW OF SYSTEMS: Review of systems reviewed with the patient and otherwise negative except for those detailed above.    PHYSICAL EXAM: BP (!) 159/82 (BP Location: Left arm, Patient Position: Sitting, Cuff Size: Adult Large)   Pulse 80   Temp 98.3  F (36.8  C) (Oral)   Resp 16   Ht 1.727 m (5' 7.99\")   Wt 94.8 kg (209 lb 1.6 oz)   SpO2 95%   BMI 31.80 kg/m  .  ECOG performance status: 1.  Physical exam was unchanged from prior clinic visit 04/28/2025.  Skin: No erythema or rash.  HEENT: Sclera nonicteric. Oropharynx without lesions or ulceration, mucosa pink and moist.  Nodes: No cervical, supraclavicular, axillary, or inguinal adenopathy.  Lungs: No dullness to percussion.  No rales, wheezes, rhonchi.  Heart: Regular rate and rhythm.  Abdomen: Bowel sounds present.  Soft, nontender, no hepatosplenomegaly or mass.  Extremities: No edema.      LABORATORY REVIEWED:  Component      Latest Ref Rng 8/1/2024  12:17 PM " 5/28/2025  3:09 PM   WBC      4.0 - 11.0 10e3/uL  7.1    RBC Count      4.40 - 5.90 10e6/uL  4.68    Hemoglobin      13.3 - 17.7 g/dL  14.6    Hematocrit      40.0 - 53.0 %  44.1    MCV      78 - 100 fL  94    MCH      26.5 - 33.0 pg  31.2    MCHC      31.5 - 36.5 g/dL  33.1    RDW      10.0 - 15.0 %  14.6    Platelet Count      150 - 450 10e3/uL  143 (L)    % Neutrophils      %  66    % Lymphocytes      %  18    % Monocytes      %  9    % Eosinophils      %  6    % Basophils      %  1    % Immature Granulocytes      %  0    NRBC/W      <1 /100  0    Absolute Neutrophil      1.6 - 8.3 10e3/uL  4.7    Absolute Lymphocytes      0.8 - 5.3 10e3/uL  1.3    Absolute Monocytes      0.0 - 1.3 10e3/uL  0.6    Absolute Eosinophils      0.0 - 0.7 10e3/uL  0.5    Absolute Basophils      0.0 - 0.2 10e3/uL  0.0    Absolute Immature Granulocytes      <=0.4 10e3/uL  0.0    Absolute NRBCs      10e3/uL  0.0    Sodium      135 - 145 mmol/L  139    Potassium      3.4 - 5.3 mmol/L  4.8    Carbon Dioxide (CO2)      22 - 29 mmol/L  24    Anion Gap      7 - 15 mmol/L  13    Urea Nitrogen      8.0 - 23.0 mg/dL  24.1 (H)    Creatinine      0.67 - 1.17 mg/dL  1.15    GFR Estimate      >60 mL/min/1.73m2  63    Calcium      8.8 - 10.4 mg/dL  9.4    Chloride      98 - 107 mmol/L  102    Glucose      70 - 99 mg/dL  101 (H)    Alkaline Phosphatase      40 - 150 U/L  129    AST      0 - 45 U/L  29    ALT      0 - 70 U/L  25    Protein Total      6.4 - 8.3 g/dL  7.2    Albumin      3.5 - 5.2 g/dL  4.6    Bilirubin Total      <=1.2 mg/dL  0.4    PSA Tumor Marker      ng/mL 5.36  0.58        Component  Ref Range & Units 2 mo ago   PSA, Ultrasensitive  0.0 - 4.0 ng/mL 0.793   Comment: Patient results determined by assays using different manufacturers or methods may not be comparable. This test is performed on the Roche yonatan e platform using an Electrochemiluminescence immunoassay (ECLIA).   [facility]     Specimen Collected:  03/24/25  9:44 AM     Testosterone pending.    IMPRESSION/PLAN: Stage FRANKIE adenocarcinoma of the prostate.  Prostate cancer is a Wiliam 4+5=9 acinar adenocarcinoma with intraductal carcinoma involving 30% of the prostate, lymphovascular invasion, extraprostatic extension, invasion of the left seminal vesicle, positive surgical margin, but without lymph node metastases.  Patient underwent definitive surgery (11/14/2024).  There is PSA persistence with postoperative PSA of 0 173 ng/mL (03/24/2025).  There are suspicious PSMA-avid subcentimeter pelvic lymph nodes in the presacral and right pelvic sidewall area detected on restaging PSMA PET/CT scan (04/04/2025). Patient has stage IV prostate cancer and germline cancer gene mutation panel ([employer]), and somatic next generation sequencing ([facility]) on the prostatectomy sample (11/14/2024) will be performed to evaluate for mutations and biomarkers that can direct effective therapies.   Radiation oncology consultation (04/18/2025) felt that the suspicious pelvic lymphadenopathy was in close proximity to small bowel precluding definitive radiation therapy.  Patient opted against radiation oncology consultation in [place].  Low volume metastatic castrate sensitive prostate cancer may be treated with ADT consisting of leuprolide 22.5 mg every 3 months plus androgen receptor pathway inhibitor such as abiraterone (LATITUDE clinical trial, N Engl J Med 2017;377:352-360), enzalutamide (ENZAMET clinical trial, N Engl J Med 2019;381:121-131), apalutamide (TITAN clinical trial, N Engl J Med 2019;381(1):13-24), or darolutamide (ARANOTE clinical trial, J Clin Oncol 2024; 42(36):3289-1301).  Enzalutamide, apalutamide, and darolutamide are associated with multiple drug interactions, in particular with dronedarone and apixaban, which makes monitoring of these medications and their therapeutic effects exquisitely difficult, and patient is receiving consisting of leuprolide 22.5 mL every 3  months (beginning 05/28/2025) and abiraterone 1000 mg daily plus prednisone 5 mg daily. I reviewed the risk and side effects of abiraterone with the patient, which is associated with fatigue, hot flashes, fever, cognitive difficulty, depression, rash, abdominal pain, nausea, vomiting, diarrhea, constipation, myalgias, arthralgias, edema, falls, fractures, adrenal insufficiency, hypertension, arrhythmia, myocardial infarction, heart failure, hepatotoxicity, hyperlipidemia, anemia.  Leuprolide is associated with fatigue, hot flashes, weakness, loss of muscle mass, weight gain, forgetfulness, depression, irritability, breast enlargement, loss of libido, impotence, joint stiffness and pain, coronary artery disease, osteoporosis, and bone fracture.  Patient understood the indication and risks and agreed to proceed.  Bicalutamide 50 mg daily x21 days will be initiated at the time of the first leuprolide injection.  Thereafter bicalutamide will be replaced by abiraterone/prednisone.  Patient will return to clinic in 6 weeks with CBC, metabolic panel, PSA, testosterone. The current and past history, clinical evaluation, reviewing diagnostic tests and imaging with the patient, assessment, complex management of abiraterone/leuprolide toxicities, and planning occurred over 30 minutes.       Polo Webster MD    cc: MD Sandy Echevarria MD Clayton Chen, MD    Again, thank you for allowing me to participate in the care of your patient.        Sincerely,        Polo Webster MD    Electronically signed

## 2025-05-31 LAB — TESTOST SERPL-MCNC: 246 NG/DL (ref 240–950)

## 2025-06-02 ENCOUNTER — TELEPHONE (OUTPATIENT)
Dept: ONCOLOGY | Facility: CLINIC | Age: 85
End: 2025-06-02
Payer: MEDICARE

## 2025-06-02 DIAGNOSIS — C61 PROSTATE CANCER (H): Primary | ICD-10-CM

## 2025-06-02 RX ORDER — ABIRATERONE ACETATE 250 MG/1
1000 TABLET ORAL DAILY
Qty: 120 TABLET | Refills: 0 | Status: SHIPPED | OUTPATIENT
Start: 2025-06-07 | End: 2025-07-07

## 2025-06-02 NOTE — ORAL ONC MGMT
Oral Chemotherapy Monitoring Program    Lab Monitoring Plan    Labs drawn outside of [place]: n/a  Subjective/Objective:  Mushtaq Olsen is a 85 year old male contacted by phone for an initial visit for oral chemotherapy education. I spoke with Mushtaq briefly but he asked that I speak to his wife, Mima, since she speaks English better than he does. Explained when to start Zytiga/prednisone (after done with bicalutamide), dosing, with/without food, storage, & side effects.    Patient currently has elevated blood pressure, hovering around 160/90.     Asked if these medications are okay to take with his pain medicine (acetaminophen & diclofenac). Zytiga can increase the effect of patient's metoprolol. Asked Mima to take Mushtaq's blood pressure daily, and if they climb above 160/90, to reach out and we will likely need to increase his dose or add another antihypertensive. Mima was concerned about his blood pressure getting too high while they are on vacation (see below), and I suggested we get it under control prior to them leaving.    Mima shared they are traveling internationally 6/26-7/21. Will need to do an early fill/vacation override in a couple weeks. Sent Teams message to cinthya Contrer[employer] liaison covering for [place], as a heads-up.    Sent Zytiga order to [facility]. Patient aware [facility] will reach out to set up delivery.    Assessment:  Patient is appropriate to start therapy.    Plan:  Basic chemotherapy teaching was reviewed with the patient and the patient's family including indication, start date of therapy, dose, administration, adverse effects, missed doses, food and drug interactions, monitoring, side effect management, office contact information, and safe handling. Written materials were provided and all questions answered.    Follow-Up:  1-week after start ~6/16     Delifna Decker PharmD  Oral Chemotherapy Monitoring Program  [facility]  [phone]         4/29/2025    12:00 PM 4/30/2025    12:00 PM  "5/1/2025     3:00 PM 6/2/2025     9:00 AM   ORAL CHEMOTHERAPY   Assessment Type Initial Work up Discontinuation Initial Work up New Teach;Refill   Stop Date  --     Diagnosis Code Prostate Cancer Prostate Cancer Prostate Cancer Prostate Cancer   Providers Dr. Drain Webster   Clinic Name/Location [facility]   Is this patient followed by the [facility] OC team?    No   Drug Name Xtandi (enzalutamide) Xtandi (enzalutamide) Zytiga (abiraterone) Zytiga (abiraterone)   Dose 160 mg   1,000 mg  1,000 mg   Current Schedule Daily  Daily Daily   Cycle Details Continuous  Continuous Continuous   Planned next cycle start date    6/7/2025   Home BPs    any BPs> 160/100   Any new drug interactions? Yes Yes Yes No   Pharmacist Intervention? Yes Yes Yes    Intervention(s) -- -- --    Is the dose as ordered appropriate for the patient?    Yes   Was a medication prescribed as part of a CPA?    No       Data saved with a previous flowsheet row definition       Last PHQ-2 Score on record:       4/18/2025     9:38 AM 11/27/2024     4:15 PM   PHQ-2 ( 1999 Pf[employer])   Q1: Little interest or pleasure in doing things 0 0   Q2: Feeling down, depressed or hopeless 0 0   PHQ-2 Score 0 0       Vitals:  BP:   BP Readings from Last 1 Encounters:   05/28/25 (!) 159/82     Wt Readings from Last 1 Encounters:   05/28/25 94.8 kg (209 lb 1.6 oz)     Estimated body surface area is 2.13 meters squared as calculated from the following:    Height as of 5/28/25: 1.727 m (5' 7.99\").    Weight as of 5/28/25: 94.8 kg (209 lb 1.6 oz).    Labs:  _  Result Component Current Result Ref Range   Sodium 139 (5/28/2025) 135 - 145 mmol/L     _  Result Component Current Result Ref Range   Potassium 4.8 (5/28/2025) 3.4 - 5.3 mmol/L     _  Result Component Current Result Ref Range   Calcium 9.4 (5/28/2025) 8.8 - 10.4 mg/dL     No results found for Mag within last 30 days.     No results found for Phos within last 30 days. "     _  Result Component Current Result Ref Range   Albumin 4.6 (5/28/2025) 3.5 - 5.2 g/dL     _  Result Component Current Result Ref Range   Urea Nitrogen 24.1 (H) (5/28/2025) 8.0 - 23.0 mg/dL     _  Result Component Current Result Ref Range   Creatinine 1.15 (5/28/2025) 0.67 - 1.17 mg/dL     _  Result Component Current Result Ref Range   AST 29 (5/28/2025) 0 - 45 U/L     _  Result Component Current Result Ref Range   ALT 25 (5/28/2025) 0 - 70 U/L     _  Result Component Current Result Ref Range   Bilirubin Total 0.4 (5/28/2025) <=1.2 mg/dL     _  Result Component Current Result Ref Range   WBC Count 7.1 (5/28/2025) 4.0 - 11.0 10e3/uL     _  Result Component Current Result Ref Range   Hemoglobin 14.6 (5/28/2025) 13.3 - 17.7 g/dL     _  Result Component Current Result Ref Range   Platelet Count 143 (L) (5/28/2025) 150 - 450 10e3/uL     _  Result Component Current Result Ref Range   Absolute Neutrophils 4.7 (5/28/2025) 1.6 - 8.3 10e3/uL     No results found for ANC within last 30 days.

## 2025-06-16 ENCOUNTER — TELEPHONE (OUTPATIENT)
Dept: ONCOLOGY | Facility: CLINIC | Age: 85
End: 2025-06-16
Payer: MEDICARE

## 2025-06-16 NOTE — ORAL ONC MGMT
Oral Chemotherapy Monitoring Program    Subjective/Objective:  Mushtaq Olsen is a 85 year old male contacted by phone for a follow-up visit for oral chemotherapy.  Mushtaq indicated that things are going well and he denied any adverse effects since starting Zytiga. He specifically denied any new muscle or joint aches, nor any problems with stools. He said he swims for an hour every day. He said he is sleeping well, eating well and has no concerns at this time. He said he drinks about a liter of water per day. He has not checked his blood pressure.        4/29/2025    12:00 PM 4/30/2025    12:00 PM 5/1/2025     3:00 PM 6/2/2025     9:00 AM 6/16/2025     1:00 PM   ORAL CHEMOTHERAPY   Assessment Type Initial Work up Discontinuation Initial Work up New Teach;Refill Initial Follow up   Stop Date  --      Diagnosis Code Prostate Cancer Prostate Cancer Prostate Cancer Prostate Cancer Prostate Cancer   Providers Dr. Darin Webster   [facility]   Is this patient followed by the [facility] OC team?    No    Drug Name Xtandi (enzalutamide)  Xtandi (enzalutamide)  Zytiga (abiraterone)  Zytiga (abiraterone)  Zytiga (abiraterone)   Dose 160 mg   1,000 mg  1,000 mg  1,000 mg   Current Schedule Daily   Daily  Daily  Daily   Cycle Details Continuous  Continuous Continuous Continuous   Start Date of Last Cycle     6/9/2025   Planned next cycle start date    6/7/2025 7/9/2025   Doses missed in last 2 weeks     0   Adherence Assessment     Adherent   Adverse Effects     No AE identified during assessment   Home BPs    any BPs> 160/100 not done   Any new drug interactions? Yes Yes Yes No No   Pharmacist Intervention? Yes Yes Yes     Intervention(s) -- -- --     Is the dose as ordered appropriate for the patient?    Yes    Was a medication prescribed as part of a CPA?    No        Data saved with a previous flowsheet row definition       Last PHQ-2 Score  "on record:       4/18/2025     9:38 AM 11/27/2024     4:15 PM   PHQ-2 ( 1999 Pfizer)   Q1: Little interest or pleasure in doing things 0 0   Q2: Feeling down, depressed or hopeless 0 0   PHQ-2 Score 0 0       Vitals:  BP:   BP Readings from Last 1 Encounters:   05/28/25 (!) 159/82     Wt Readings from Last 1 Encounters:   05/28/25 94.8 kg (209 lb 1.6 oz)     Estimated body surface area is 2.13 meters squared as calculated from the following:    Height as of 5/28/25: 1.727 m (5' 7.99\").    Weight as of 5/28/25: 94.8 kg (209 lb 1.6 oz).    Labs:  _  Result Component Current Result Ref Range   Sodium 139 (5/28/2025) 135 - 145 mmol/L     _  Result Component Current Result Ref Range   Potassium 4.8 (5/28/2025) 3.4 - 5.3 mmol/L     _  Result Component Current Result Ref Range   Calcium 9.4 (5/28/2025) 8.8 - 10.4 mg/dL     No results found for Mag within last 30 days.     No results found for Phos within last 30 days.     _  Result Component Current Result Ref Range   Albumin 4.6 (5/28/2025) 3.5 - 5.2 g/dL     _  Result Component Current Result Ref Range   Urea Nitrogen 24.1 (H) (5/28/2025) 8.0 - 23.0 mg/dL     _  Result Component Current Result Ref Range   Creatinine 1.15 (5/28/2025) 0.67 - 1.17 mg/dL     _  Result Component Current Result Ref Range   AST 29 (5/28/2025) 0 - 45 U/L     _  Result Component Current Result Ref Range   ALT 25 (5/28/2025) 0 - 70 U/L     _  Result Component Current Result Ref Range   Bilirubin Total 0.4 (5/28/2025) <=1.2 mg/dL     _  Result Component Current Result Ref Range   WBC Count 7.1 (5/28/2025) 4.0 - 11.0 10e3/uL     _  Result Component Current Result Ref Range   Hemoglobin 14.6 (5/28/2025) 13.3 - 17.7 g/dL     _  Result Component Current Result Ref Range   Platelet Count 143 (L) (5/28/2025) 150 - 450 10e3/uL     _  Result Component Current Result Ref Range   Absolute Neutrophils 4.7 (5/28/2025) 1.6 - 8.3 10e3/uL     No results found for ANC within last 30 days.    "       Assessment/Plan:  uMshtaq seems to be doing well in his first couple of weeks on Zytiga. Continue. He was advised to check his blood pressure at least once a week to keep track of that. He has advised to increase his water intake to 64 ounces per day (~2 liters) to insure proper hydration. Mushtaq expressed understanding and agreement with this plan and thanked me for the call and care.    Follow-Up:  Labs next week.    Refill Due:  7/9/2025    Matt Asif PharmD  [facility]  [phone]  June 16, 2025

## 2025-06-19 DIAGNOSIS — C61 PROSTATE CANCER (H): Primary | ICD-10-CM

## 2025-06-19 RX ORDER — ABIRATERONE ACETATE 250 MG/1
1000 TABLET ORAL DAILY
Qty: 120 TABLET | Refills: 0 | OUTPATIENT
Start: 2025-07-07 | End: 2025-08-06

## 2025-06-20 ENCOUNTER — TRANSFERRED RECORDS (OUTPATIENT)
Dept: HEALTH INFORMATION MANAGEMENT | Facility: CLINIC | Age: 85
End: 2025-06-20

## 2025-06-23 ENCOUNTER — LAB (OUTPATIENT)
Dept: LAB | Facility: CLINIC | Age: 85
End: 2025-06-23
Payer: MEDICARE

## 2025-06-23 DIAGNOSIS — C61 PROSTATE CANCER (H): ICD-10-CM

## 2025-06-23 LAB
ALBUMIN SERPL BCG-MCNC: 4.3 G/DL (ref 3.5–5.2)
ALP SERPL-CCNC: 118 U/L (ref 40–150)
ALT SERPL W P-5'-P-CCNC: 19 U/L (ref 0–70)
ANION GAP SERPL CALCULATED.3IONS-SCNC: 12 MMOL/L (ref 7–15)
AST SERPL W P-5'-P-CCNC: 19 U/L (ref 0–45)
BASOPHILS # BLD AUTO: 0 10E3/UL (ref 0–0.2)
BASOPHILS NFR BLD AUTO: 1 %
BILIRUB SERPL-MCNC: 0.6 MG/DL
BUN SERPL-MCNC: 22.9 MG/DL (ref 8–23)
CALCIUM SERPL-MCNC: 9.1 MG/DL (ref 8.8–10.4)
CHLORIDE SERPL-SCNC: 103 MMOL/L (ref 98–107)
CREAT SERPL-MCNC: 1.12 MG/DL (ref 0.67–1.17)
EGFRCR SERPLBLD CKD-EPI 2021: 65 ML/MIN/1.73M2
EOSINOPHIL # BLD AUTO: 0.4 10E3/UL (ref 0–0.7)
EOSINOPHIL NFR BLD AUTO: 5 %
ERYTHROCYTE [DISTWIDTH] IN BLOOD BY AUTOMATED COUNT: 13.7 % (ref 10–15)
GLUCOSE SERPL-MCNC: 101 MG/DL (ref 70–99)
HCO3 SERPL-SCNC: 24 MMOL/L (ref 22–29)
HCT VFR BLD AUTO: 42.3 % (ref 40–53)
HGB BLD-MCNC: 14.5 G/DL (ref 13.3–17.7)
IMM GRANULOCYTES # BLD: 0.1 10E3/UL
IMM GRANULOCYTES NFR BLD: 1 %
LYMPHOCYTES # BLD AUTO: 1.9 10E3/UL (ref 0.8–5.3)
LYMPHOCYTES NFR BLD AUTO: 23 %
MCH RBC QN AUTO: 31.7 PG (ref 26.5–33)
MCHC RBC AUTO-ENTMCNC: 34.3 G/DL (ref 31.5–36.5)
MCV RBC AUTO: 93 FL (ref 78–100)
MONOCYTES # BLD AUTO: 0.6 10E3/UL (ref 0–1.3)
MONOCYTES NFR BLD AUTO: 7 %
NEUTROPHILS # BLD AUTO: 5.5 10E3/UL (ref 1.6–8.3)
NEUTROPHILS NFR BLD AUTO: 65 %
NRBC # BLD AUTO: 0 10E3/UL
NRBC BLD AUTO-RTO: 0 /100
PLATELET # BLD AUTO: 167 10E3/UL (ref 150–450)
POTASSIUM SERPL-SCNC: 4.1 MMOL/L (ref 3.4–5.3)
PROT SERPL-MCNC: 6.7 G/DL (ref 6.4–8.3)
PSA SERPL DL<=0.01 NG/ML-MCNC: 0.03 NG/ML
RBC # BLD AUTO: 4.57 10E6/UL (ref 4.4–5.9)
SODIUM SERPL-SCNC: 139 MMOL/L (ref 135–145)
WBC # BLD AUTO: 8.5 10E3/UL (ref 4–11)

## 2025-06-23 PROCEDURE — 84403 ASSAY OF TOTAL TESTOSTERONE: CPT

## 2025-06-23 PROCEDURE — 84153 ASSAY OF PSA TOTAL: CPT

## 2025-06-23 PROCEDURE — 82310 ASSAY OF CALCIUM: CPT

## 2025-06-23 PROCEDURE — 36415 COLL VENOUS BLD VENIPUNCTURE: CPT

## 2025-06-23 PROCEDURE — 85004 AUTOMATED DIFF WBC COUNT: CPT

## 2025-06-26 ENCOUNTER — PATIENT OUTREACH (OUTPATIENT)
Dept: ONCOLOGY | Facility: CLINIC | Age: 85
End: 2025-06-26
Payer: MEDICARE

## 2025-06-26 LAB — TESTOST SERPL-MCNC: <2 NG/DL (ref 240–950)

## 2025-06-26 NOTE — PROGRESS NOTES
[facility]: Cancer Care                                                                                          Patient's spouse called requesting to cancel 7/7 lab appt.  Also requesting letter from MD to note patient cannot fly to [place] due to health condition.  Letter sent via [employer].    Kaley ESQUIVEL RN  Cancer Care Coordinator  [facility]

## 2025-06-26 NOTE — LETTER
6/26/2025      Mushtaq Olsen  [address] [place]  [place] [address]      To Whom It May Concern:      Mushtaq has been under my care since April 2025.  Due to his health condition, he is not safe to fly.      Sincerely,        Polo Webster MD  Medical Oncology  Electronically signed

## 2025-07-16 NOTE — PROGRESS NOTES
PRIMARY CARE PHYSICIAN: Saurabh Fuller MD  UROLOGY: Sandy Kumari MD  RADIATION ONCOLOGY: Guanaco Olsen MD    HISTORY OF PRESENT ILLNESS: Patient is an 85-year-old male with stage FRANKIE adenocarcinoma of the prostate (pT3b, cN1, cM0, PSA 5.36 ng/mL, grade group 5).  Patient was diagnosed with stage IIIC adenocarcinoma of the prostate (pT3b, pN0, cM0, PSA 5.36 ng/mL, grade group 5) and presented in urology clinic 06/25/2024, with urinary frequency, decreased force of stream, nocturia 4-5 times per night, left prostate nodule on digital rectal exam, and PSA 5.36 ng/mL (08/01/2024). MRI prostate 07/29/2024, revealed estimated prostate size of 43 g.  There was a 10 mm ill-defined T2 hypointense lesion with focal DWI diffusion restriction in the 5:00 position of the left mid gland peripheral zone with 6/15 mm capsular abutment (PI-RADS 4) labeled as lesion 1.  There was no neurovascular bundle involvement.  There was no seminal vesicle involvement.  There was no lymphadenopathy or suspicious bone lesions.  There were postsurgical changes related to prior TURP and the transition zone with circumscribed transition zone nodules without diffusion restriction (PI-RADS 2). Cystoscopy 08/27/2024, was negative for bladder obstruction or urethral stricture, and the bladder was unremarkable. UroNav-guided MRI-ultrasound fusion prostate core needle biopsy 09/13/2024, revealed a [place] 4+5=9 adenocarcinoma involving 15% of 1 of 2 core needle biopsy samples from the right base, 20% of 1 of 2 biopsy samples from the right mid, and 70% of 2 of 2 biopsy samples from target lesion 1 in the left mid peripheral zone.  There was a [place] grade 4+4=8 adenocarcinoma involving 30% of 1 of 2 core needle biopsy samples from the left base, 30% of 2 of 2 biopsy samples with perineural invasion and suspicion of vascular invasion from the left mid, and 20% of 2 of 2 biopsy samples from the left apex.  There was benign prostatic tissue in 2 of 2 core  needle biopsy samples from the right apex (9/14 biopsy samples involved). 68-Ga PSMA-11 PET/CT scan 10/03/2024, revealed prostate enlargement measuring 6 x 4.3 cm with focal PSMA uptake along the left mid peripheral zone with SUV max 7.3.  There was an additional small focus of PSMA uptake in the right lateral peripheral zone with SUV max 7.4).  There was linear intense PSMA avidity in the prostate corresponding to the TURP defect.  There were no suspicious regional or distant PSMA-avid lesions.     Patient underwent robotic-assisted laparoscopic prostatectomy and bilateral pelvic lymph node dissection 11/14/2024, revealed a [place] 4+5=9 acinar adenocarcinoma involving 30% of the right to left posterolateral mid to base of the gland. There was intraductal carcinoma and cribriform glands present.  There was lymphovascular invasion.  There was multifocal extraprostatic extension involving the left posterolateral mid gland.  There was invasion of the left seminal vesicle.  There was no bladder neck invasion.  There was unifocal 0.1 cm involvement of the left posterior lateral mid margin at the site of extraprostatic extension. Three regional lymph nodes were negative for metastases (0/3 lymph nodes involved).  Pathology was reviewed at the [facility]. Postoperative PSA was 0.173 ng/mL (03/24/2025).  Restaging 68-Ga PSMA-11 PET/CT scan 04/04/2025, revealed postsurgical changes related to prior prostatectomy with focal PSMA uptake in the surgical bed.  There were few mildly PSMA avid subcentimeter pelvic regional lymph nodes including a 0.4 cm left presacral lymph node with SUV 1.4 that was unchanged, a 0.3 cm left presacral lymph node with SUV 2.6 that was unchanged, a 0.4 cm right pelvic sidewall lymph node with SUV 2.3 (previously 0.2 cm).  There were no other PSMA-avid lesions. 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  Radiation oncology consultation 04/18/2025, determine that there was suspicious pelvic lymphadenopathy, some of which were in close proximity to small bowel precluding definitive radiation therapy.  Patient opted against radiation oncology consultation in [place].  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  Patient is receiving androgen deprivation therapy (ADT) consisting of leuprolide 22.5 mg every 3 months beginning 05/28/2025 and abiraterone 1000 mg daily on an empty stomach plus prednisone 5 mg daily with breakfast beginning 06/09/2025. Patient has urinary frequency, urgency, and nocturia 3 times per night.  The urinary stress incontinence resolved.  Patient has chronic low back pain due to lumbar radiculopathy, which limits his ability to walk long distances. Patient exercises in the pool regularly and he walks the dog.  There are no other new symptoms or events since the prior clinic visit (05/28/2025).  He denies fever, anorexia, weight loss, headache, cough, dyspnea, chest pain, abdominal pain, nausea, vomiting, constipation, diarrhea, bleeding, or bone pain.     PAST HISTORY: Past history was reviewed and unchanged from the clinic note 04/28/2025.     MEDICATIONS:   Current Outpatient Medications   Medication Sig Dispense Refill    abiraterone (ZYTIGA) 250 MG tablet Take 4 tablets (1,000 mg) by mouth daily for 30 doses. Take on empty stomach. 120 tablet 0    acetaminophen (TYLENOL) 500 MG tablet Take 500 mg by mouth every 6 hours as needed for mild pain.      allopurinol (ZYLOPRIM) 100 MG tablet Take 100 mg by mouth every morning.      amiodarone (PACERONE) 100 MG TABS tablet Take 100 mg by mouth daily.      apixaban ANTICOAGULANT (ELIQUIS) 5 MG tablet Take 1 tablet (5 mg) by mouth 2 times daily. Please hold for 5 days after surgery      atorvastatin (LIPITOR) 20 MG tablet Take 20 mg by mouth at bedtime.      cholecalciferol 50 MCG (2000 UT) tablet Take 1 tablet by  "mouth daily.      diphenhydrAMINE-acetaminophen (TYLENOL PM)  MG tablet Take 1 tablet by mouth at bedtime.      dronedarone (MULTAQ) 400 MG TABS tablet Take 1 tablet by mouth every morning.      esomeprazole (NEXIUM) 20 MG DR capsule Take 2 capsules by mouth every morning (before breakfast). Take 30-60 minutes before eating.      metoprolol succinate ER (TOPROL XL) 50 MG 24 hr tablet Take 1 tablet by mouth every morning.      Multiple Vitamins-Minerals (PRESERVISION AREDS) TABS Take 1 tablet by mouth daily.      predniSONE (DELTASONE) 5 MG tablet Take 1 tablet (5 mg) by mouth daily (with breakfast). 90 tablet 3    atorvastatin (LIPITOR) 40 MG tablet Take 1 tablet by mouth at bedtime. (Patient not taking: Reported on 7/21/2025)         REVIEW OF SYSTEMS: Review of systems reviewed with the patient and otherwise negative except for those detailed above.    PHYSICAL EXAM: BP (!) 176/92 (BP Location: Right arm, Patient Position: Sitting, Cuff Size: Adult Large)   Pulse 81   Temp 97.3  F (36.3  C) (Oral)   Resp 16   Ht 1.727 m (5' 7.99\")   Wt 98.2 kg (216 lb 8 oz)   SpO2 97%   BMI 32.93 kg/m  .  ECOG performance status: 1.  Physical exam was unchanged from prior clinic visit 05/28/2025.  Skin: No erythema or rash.  HEENT: Sclera nonicteric. Oropharynx without lesions or ulceration, mucosa pink and moist.  Nodes: No cervical, supraclavicular, axillary, or inguinal adenopathy.  Lungs: No dullness to percussion.  No rales, wheezes, rhonchi.  Heart: Regular rate and rhythm.  Abdomen: Bowel sounds present.  Soft, nontender, no hepatosplenomegaly or mass.  Extremities: No edema.      LABORATORY REVIEWED:  Component      Latest Ref Rng 5/28/2025  3:09 PM 6/23/2025  7:42 AM 7/21/2025  10:09 AM   WBC      4.0 - 11.0 10e3/uL   9.1    RBC Count      4.40 - 5.90 10e6/uL   4.39 (L)    Hemoglobin      13.3 - 17.7 g/dL   13.8    Hematocrit      40.0 - 53.0 %   41.8    MCV      78 - 100 fL   95    MCH      26.5 - 33.0 pg   " 31.4    MCHC      31.5 - 36.5 g/dL   33.0    RDW      10.0 - 15.0 %   13.3    Platelet Count      150 - 450 10e3/uL   150    % Neutrophils      %   76    % Lymphocytes      %   12    % Monocytes      %   7    % Eosinophils      %   4    % Basophils      %   0    % Immature Granulocytes      %   1    NRBC/W      <1 /100   0    Absolute Neutrophil      1.6 - 8.3 10e3/uL   6.9    Absolute Lymphocytes      0.8 - 5.3 10e3/uL   1.1    Absolute Monocytes      0.0 - 1.3 10e3/uL   0.7    Absolute Eosinophils      0.0 - 0.7 10e3/uL   0.4    Absolute Basophils      0.0 - 0.2 10e3/uL   0.0    Absolute Immature Granulocytes      <=0.4 10e3/uL   0.1    Absolute NRBCs      10e3/uL   0.0    Sodium      135 - 145 mmol/L   140    Potassium      3.4 - 5.3 mmol/L   4.4    Carbon Dioxide (CO2)      22 - 29 mmol/L   26    Anion Gap      7 - 15 mmol/L   11    Urea Nitrogen      8.0 - 23.0 mg/dL   24.9 (H)    Creatinine      0.67 - 1.17 mg/dL   1.28 (H)    GFR Estimate      >60 mL/min/1.73m2   55 (L)    Calcium      8.8 - 10.4 mg/dL   9.5    Chloride      98 - 107 mmol/L   103    Glucose      70 - 99 mg/dL   101 (H)    Alkaline Phosphatase      40 - 150 U/L   106    AST      0 - 45 U/L   23    ALT      0 - 70 U/L   19    Protein Total      6.4 - 8.3 g/dL   6.6    Albumin      3.5 - 5.2 g/dL   4.2    Bilirubin Total      <=1.2 mg/dL   0.6    PSA Tumor Marker      ng/mL 0.58  0.03  0.02    Testosterone Total      240 - 950 ng/dL 246  <2 (L)         IMPRESSION/PLAN: Stage FRANKIE adenocarcinoma of the prostate.  Prostate cancer is a [place] 4+5=9 acinar adenocarcinoma with intraductal carcinoma involving 30% of the prostate, lymphovascular invasion, extraprostatic extension, invasion of the left seminal vesicle, positive surgical margin, but without lymph node metastases.  Patient underwent definitive surgery (11/14/2024).  There is PSA persistence with postoperative PSA of 0 173 ng/mL (03/24/2025).  There are suspicious PSMA-avid subcentimeter  pelvic lymph nodes in the presacral and right pelvic sidewall area detected on restaging PSMA PET/CT scan (04/04/2025). Patient has stage IV prostate cancer and germline cancer gene mutation panel ([employer]), and somatic next generation sequencing ([facility]) on the prostatectomy sample (11/14/2024) will be performed to evaluate for mutations and biomarkers that can direct effective therapies.   Radiation oncology consultation (04/18/2025) felt that the suspicious pelvic lymphadenopathy was in close proximity to small bowel precluding definitive radiation therapy.  Patient opted against radiation oncology consultation in [place].  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  Enzalutamide, apalutamide, and darolutamide are associated with multiple drug interactions, in particular with dronedarone and apixaban, which makes monitoring of these medications and their therapeutic effects exquisitely difficult, and abiraterone preferred.  There are no adverse effects.  Abiraterone/leuprolide will be continued without modification.  I reviewed the risk and side effects of abiraterone with the patient, which is associated with fatigue, hot flashes, fever, cognitive difficulty, depression, rash, abdominal pain, nausea, vomiting, diarrhea, constipation, myalgias, arthralgias, edema, falls, fractures, adrenal insufficiency, hypertension, arrhythmia, myocardial infarction, heart failure, hepatotoxicity, hyperlipidemia, anemia.  Leuprolide is associated with fatigue, hot flashes, weakness, loss of muscle mass, weight gain, forgetfulness, depression, irritability, breast enlargement, loss of libido, impotence, joint stiffness and pain, coronary artery  disease, osteoporosis, and bone fracture.  Patient understood the indication and risks and agreed to continue therapy.  Patient will return to clinic in 6 weeks with CBC, metabolic panel, PSA, and for the next leuprolide injection sometime. The current and past history, clinical evaluation, reviewing diagnostic tests and imaging with the patient, assessment, complex management of abiraterone/leuprolide toxicities, and planning occurred over 30 minutes.       Polo Webster MD    cc: MD Sandy Echevarria MD Clayton Chen, MD

## 2025-07-19 ENCOUNTER — HEALTH MAINTENANCE LETTER (OUTPATIENT)
Age: 85
End: 2025-07-19

## 2025-07-21 ENCOUNTER — ONCOLOGY VISIT (OUTPATIENT)
Dept: ONCOLOGY | Facility: CLINIC | Age: 85
End: 2025-07-21
Attending: INTERNAL MEDICINE
Payer: MEDICARE

## 2025-07-21 ENCOUNTER — NURSE TRIAGE (OUTPATIENT)
Dept: ONCOLOGY | Facility: CLINIC | Age: 85
End: 2025-07-21

## 2025-07-21 ENCOUNTER — LAB (OUTPATIENT)
Dept: LAB | Facility: CLINIC | Age: 85
End: 2025-07-21
Payer: MEDICARE

## 2025-07-21 VITALS
BODY MASS INDEX: 32.81 KG/M2 | HEIGHT: 68 IN | HEART RATE: 81 BPM | OXYGEN SATURATION: 97 % | WEIGHT: 216.5 LBS | TEMPERATURE: 97.3 F | SYSTOLIC BLOOD PRESSURE: 176 MMHG | DIASTOLIC BLOOD PRESSURE: 92 MMHG | RESPIRATION RATE: 16 BRPM

## 2025-07-21 DIAGNOSIS — C61 PROSTATE CANCER (H): Primary | ICD-10-CM

## 2025-07-21 DIAGNOSIS — C61 PROSTATE CANCER (H): ICD-10-CM

## 2025-07-21 LAB
ALBUMIN SERPL BCG-MCNC: 4.2 G/DL (ref 3.5–5.2)
ALP SERPL-CCNC: 106 U/L (ref 40–150)
ALT SERPL W P-5'-P-CCNC: 19 U/L (ref 0–70)
ANION GAP SERPL CALCULATED.3IONS-SCNC: 11 MMOL/L (ref 7–15)
AST SERPL W P-5'-P-CCNC: 23 U/L (ref 0–45)
BASOPHILS # BLD AUTO: 0 10E3/UL (ref 0–0.2)
BASOPHILS NFR BLD AUTO: 0 %
BILIRUB SERPL-MCNC: 0.6 MG/DL
BUN SERPL-MCNC: 24.9 MG/DL (ref 8–23)
CALCIUM SERPL-MCNC: 9.5 MG/DL (ref 8.8–10.4)
CHLORIDE SERPL-SCNC: 103 MMOL/L (ref 98–107)
CREAT SERPL-MCNC: 1.28 MG/DL (ref 0.67–1.17)
EGFRCR SERPLBLD CKD-EPI 2021: 55 ML/MIN/1.73M2
EOSINOPHIL # BLD AUTO: 0.4 10E3/UL (ref 0–0.7)
EOSINOPHIL NFR BLD AUTO: 4 %
ERYTHROCYTE [DISTWIDTH] IN BLOOD BY AUTOMATED COUNT: 13.3 % (ref 10–15)
GLUCOSE SERPL-MCNC: 101 MG/DL (ref 70–99)
HCO3 SERPL-SCNC: 26 MMOL/L (ref 22–29)
HCT VFR BLD AUTO: 41.8 % (ref 40–53)
HGB BLD-MCNC: 13.8 G/DL (ref 13.3–17.7)
IMM GRANULOCYTES # BLD: 0.1 10E3/UL
IMM GRANULOCYTES NFR BLD: 1 %
LYMPHOCYTES # BLD AUTO: 1.1 10E3/UL (ref 0.8–5.3)
LYMPHOCYTES NFR BLD AUTO: 12 %
MCH RBC QN AUTO: 31.4 PG (ref 26.5–33)
MCHC RBC AUTO-ENTMCNC: 33 G/DL (ref 31.5–36.5)
MCV RBC AUTO: 95 FL (ref 78–100)
MONOCYTES # BLD AUTO: 0.7 10E3/UL (ref 0–1.3)
MONOCYTES NFR BLD AUTO: 7 %
NEUTROPHILS # BLD AUTO: 6.9 10E3/UL (ref 1.6–8.3)
NEUTROPHILS NFR BLD AUTO: 76 %
NRBC # BLD AUTO: 0 10E3/UL
NRBC BLD AUTO-RTO: 0 /100
PLATELET # BLD AUTO: 150 10E3/UL (ref 150–450)
POTASSIUM SERPL-SCNC: 4.4 MMOL/L (ref 3.4–5.3)
PROT SERPL-MCNC: 6.6 G/DL (ref 6.4–8.3)
PSA SERPL DL<=0.01 NG/ML-MCNC: 0.02 NG/ML
RBC # BLD AUTO: 4.39 10E6/UL (ref 4.4–5.9)
SODIUM SERPL-SCNC: 140 MMOL/L (ref 135–145)
WBC # BLD AUTO: 9.1 10E3/UL (ref 4–11)

## 2025-07-21 PROCEDURE — 36415 COLL VENOUS BLD VENIPUNCTURE: CPT | Performed by: PATHOLOGY

## 2025-07-21 PROCEDURE — G0463 HOSPITAL OUTPT CLINIC VISIT: HCPCS | Performed by: INTERNAL MEDICINE

## 2025-07-21 PROCEDURE — 99214 OFFICE O/P EST MOD 30 MIN: CPT | Performed by: INTERNAL MEDICINE

## 2025-07-21 PROCEDURE — 99000 SPECIMEN HANDLING OFFICE-LAB: CPT | Performed by: PATHOLOGY

## 2025-07-21 PROCEDURE — 80053 COMPREHEN METABOLIC PANEL: CPT | Performed by: PATHOLOGY

## 2025-07-21 PROCEDURE — 85025 COMPLETE CBC W/AUTO DIFF WBC: CPT | Performed by: PATHOLOGY

## 2025-07-21 PROCEDURE — 84403 ASSAY OF TOTAL TESTOSTERONE: CPT | Performed by: INTERNAL MEDICINE

## 2025-07-21 PROCEDURE — 84153 ASSAY OF PSA TOTAL: CPT | Performed by: PATHOLOGY

## 2025-07-21 RX ORDER — AMIODARONE HYDROCHLORIDE 100 MG/1
100 TABLET ORAL DAILY
COMMUNITY

## 2025-07-21 RX ORDER — ATORVASTATIN CALCIUM 20 MG/1
20 TABLET, FILM COATED ORAL AT BEDTIME
COMMUNITY
Start: 2025-06-25

## 2025-07-21 RX ORDER — VIT A/VIT C/VIT E/ZINC/COPPER 2148-113
1 TABLET ORAL DAILY
COMMUNITY

## 2025-07-21 ASSESSMENT — PAIN SCALES - GENERAL: PAINLEVEL_OUTOF10: NO PAIN (0)

## 2025-07-21 NOTE — NURSING NOTE
"Oncology Rooming Note    July 21, 2025 10:26 AM   Mushtaq Olsen is a 85 year old male who presents for:    Chief Complaint   Patient presents with    Oncology Clinic Visit     Prostate cancer     Initial Vitals: BP (!) 176/92 (BP Location: Right arm, Patient Position: Sitting, Cuff Size: Adult Large)   Pulse 81   Temp 97.3  F (36.3  C) (Oral)   Resp 16   Wt 98.2 kg (216 lb 8 oz)   SpO2 97%   BMI 32.93 kg/m   Estimated body mass index is 32.93 kg/m  as calculated from the following:    Height as of 5/28/25: 1.727 m (5' 7.99\").    Weight as of this encounter: 98.2 kg (216 lb 8 oz). Body surface area is 2.17 meters squared.  No Pain (0) Comment: Data Unavailable   No LMP for male patient.  Allergies reviewed: Yes  Medications reviewed: Yes    Medications: Medication refills not needed today.  Pharmacy name entered into [facility]:    [id number] - [place], [place] - [address] [facility] - [place], [place] - [address] [place] [address] [facility] - [place], [place] - [address]    PHQ9:  Did this patient require a PHQ9?: No      Clinical concerns: none      Sam Monroe              "

## 2025-07-21 NOTE — TELEPHONE ENCOUNTER
Lab calls in patient is at lab and no orders besides CMP.     They ask that a cbc, testosterone , PSA be added according to last provider note.     Order for CBC, Testosterone and PSA  have been added

## 2025-07-21 NOTE — LETTER
7/21/2025      Mushtaq Olsen  [address]  [place] [address]      Dear Colleague,    Thank you for referring your patient, Mushtaq Olsen, to the [facility]. Please see a copy of my visit note below.      PRIMARY CARE PHYSICIAN: Saurabh Fuller MD  UROLOGY: Sandy Kumari MD  RADIATION ONCOLOGY: Guanaco Olsen MD    HISTORY OF PRESENT ILLNESS: Patient is an 85-year-old male with stage FRANKIE adenocarcinoma of the prostate (pT3b, cN1, cM0, PSA 5.36 ng/mL, grade group 5).  Patient was diagnosed with stage IIIC adenocarcinoma of the prostate (pT3b, pN0, cM0, PSA 5.36 ng/mL, grade group 5) and presented in urology clinic 06/25/2024, with urinary frequency, decreased force of stream, nocturia 4-5 times per night, left prostate nodule on digital rectal exam, and PSA 5.36 ng/mL (08/01/2024). MRI prostate 07/29/2024, revealed estimated prostate size of 43 g.  There was a 10 mm ill-defined T2 hypointense lesion with focal DWI diffusion restriction in the 5:00 position of the left mid gland peripheral zone with 6/15 mm capsular abutment (PI-RADS 4) labeled as lesion 1.  There was no neurovascular bundle involvement.  There was no seminal vesicle involvement.  There was no lymphadenopathy or suspicious bone lesions.  There were postsurgical changes related to prior TURP and the transition zone with circumscribed transition zone nodules without diffusion restriction (PI-RADS 2). Cystoscopy 08/27/2024, was negative for bladder obstruction or urethral stricture, and the bladder was unremarkable. UroNav-guided MRI-ultrasound fusion prostate core needle biopsy 09/13/2024, revealed a Wiliam 4+5=9 adenocarcinoma involving 15% of 1 of 2 core needle biopsy samples from the right base, 20% of 1 of 2 biopsy samples from the right mid, and 70% of 2 of 2 biopsy samples from target lesion 1 in the left mid peripheral zone.  There was a Wiliam grade 4+4=8 adenocarcinoma involving 30% of 1 of 2 core  needle biopsy samples from the left base, 30% of 2 of 2 biopsy samples with perineural invasion and suspicion of vascular invasion from the left mid, and 20% of 2 of 2 biopsy samples from the left apex.  There was benign prostatic tissue in 2 of 2 core needle biopsy samples from the right apex (9/14 biopsy samples involved). 68-Ga PSMA-11 PET/CT scan 10/03/2024, revealed prostate enlargement measuring 6 x 4.3 cm with focal PSMA uptake along the left mid peripheral zone with SUV max 7.3.  There was an additional small focus of PSMA uptake in the right lateral peripheral zone with SUV max 7.4).  There was linear intense PSMA avidity in the prostate corresponding to the TURP defect.  There were no suspicious regional or distant PSMA-avid lesions.     Patient underwent robotic-assisted laparoscopic prostatectomy and bilateral pelvic lymph node dissection 11/14/2024, revealed a Wiliam 4+5=9 acinar adenocarcinoma involving 30% of the right to left posterolateral mid to base of the gland. There was intraductal carcinoma and cribriform glands present.  There was lymphovascular invasion.  There was multifocal extraprostatic extension involving the left posterolateral mid gland.  There was invasion of the left seminal vesicle.  There was no bladder neck invasion.  There was unifocal 0.1 cm involvement of the left posterior lateral mid margin at the site of extraprostatic extension. Three regional lymph nodes were negative for metastases (0/3 lymph nodes involved).  Pathology was reviewed at the [facility]. Postoperative PSA was 0.173 ng/mL (03/24/2025).  Restaging 68-Ga PSMA-11 PET/CT scan 04/04/2025, revealed postsurgical changes related to prior prostatectomy with focal PSMA uptake in the surgical bed.  There were few mildly PSMA avid subcentimeter pelvic regional lymph nodes including a 0.4 cm left presacral lymph node with SUV 1.4 that was unchanged, a 0.3 cm left presacral lymph node with SUV 2.6 that was  unchanged, a 0.4 cm right pelvic sidewall lymph node with SUV 2.3 (previously 0.2 cm).  There were no other PSMA-avid lesions. 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  Radiation oncology consultation 04/18/2025, determine that there was suspicious pelvic lymphadenopathy, some of which were in close proximity to small bowel precluding definitive radiation therapy.  Patient opted against radiation oncology consultation in [place].  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  Patient is receiving androgen deprivation therapy (ADT) consisting of leuprolide 22.5 mg every 3 months beginning 05/28/2025 and abiraterone 1000 mg daily on an empty stomach plus prednisone 5 mg daily with breakfast beginning 06/09/2025. Patient has urinary frequency, urgency, and nocturia 3 times per night.  The urinary stress incontinence resolved.  Patient has chronic low back pain due to lumbar radiculopathy, which limits his ability to walk long distances. Patient exercises in the pool regularly and he walks the dog.  There are no other new symptoms or events since the prior clinic visit (05/28/2025).  He denies fever, anorexia, weight loss, headache, cough, dyspnea, chest pain, abdominal pain, nausea, vomiting, constipation, diarrhea, bleeding, or bone pain.     PAST HISTORY: Past history was reviewed and unchanged from the clinic note 04/28/2025.     MEDICATIONS:   Current Outpatient Medications   Medication Sig Dispense Refill     abiraterone (ZYTIGA) 250 MG tablet Take 4 tablets (1,000 mg) by mouth daily for 30 doses. Take on empty stomach. 120 tablet 0     acetaminophen (TYLENOL) 500 MG tablet Take 500 mg by mouth every 6 hours as needed for mild pain.       allopurinol (ZYLOPRIM) 100 MG tablet Take 100 mg by mouth every morning.       amiodarone (PACERONE) 100 MG TABS tablet Take 100 mg by mouth daily.        "apixaban ANTICOAGULANT (ELIQUIS) 5 MG tablet Take 1 tablet (5 mg) by mouth 2 times daily. Please hold for 5 days after surgery       atorvastatin (LIPITOR) 20 MG tablet Take 20 mg by mouth at bedtime.       cholecalciferol 50 MCG (2000 UT) tablet Take 1 tablet by mouth daily.       diphenhydrAMINE-acetaminophen (TYLENOL PM)  MG tablet Take 1 tablet by mouth at bedtime.       dronedarone (MULTAQ) 400 MG TABS tablet Take 1 tablet by mouth every morning.       esomeprazole (NEXIUM) 20 MG DR capsule Take 2 capsules by mouth every morning (before breakfast). Take 30-60 minutes before eating.       metoprolol succinate ER (TOPROL XL) 50 MG 24 hr tablet Take 1 tablet by mouth every morning.       Multiple Vitamins-Minerals (PRESERVISION AREDS) TABS Take 1 tablet by mouth daily.       predniSONE (DELTASONE) 5 MG tablet Take 1 tablet (5 mg) by mouth daily (with breakfast). 90 tablet 3     atorvastatin (LIPITOR) 40 MG tablet Take 1 tablet by mouth at bedtime. (Patient not taking: Reported on 7/21/2025)         REVIEW OF SYSTEMS: Review of systems reviewed with the patient and otherwise negative except for those detailed above.    PHYSICAL EXAM: BP (!) 176/92 (BP Location: Right arm, Patient Position: Sitting, Cuff Size: Adult Large)   Pulse 81   Temp 97.3  F (36.3  C) (Oral)   Resp 16   Ht 1.727 m (5' 7.99\")   Wt 98.2 kg (216 lb 8 oz)   SpO2 97%   BMI 32.93 kg/m  .  ECOG performance status: 1.  Physical exam was unchanged from prior clinic visit 05/28/2025.  Skin: No erythema or rash.  HEENT: Sclera nonicteric. Oropharynx without lesions or ulceration, mucosa pink and moist.  Nodes: No cervical, supraclavicular, axillary, or inguinal adenopathy.  Lungs: No dullness to percussion.  No rales, wheezes, rhonchi.  Heart: Regular rate and rhythm.  Abdomen: Bowel sounds present.  Soft, nontender, no hepatosplenomegaly or mass.  Extremities: No edema.      LABORATORY REVIEWED:  Component      Latest Ref Rng " 5/28/2025  3:09 PM 6/23/2025  7:42 AM 7/21/2025  10:09 AM   WBC      4.0 - 11.0 10e3/uL   9.1    RBC Count      4.40 - 5.90 10e6/uL   4.39 (L)    Hemoglobin      13.3 - 17.7 g/dL   13.8    Hematocrit      40.0 - 53.0 %   41.8    MCV      78 - 100 fL   95    MCH      26.5 - 33.0 pg   31.4    MCHC      31.5 - 36.5 g/dL   33.0    RDW      10.0 - 15.0 %   13.3    Platelet Count      150 - 450 10e3/uL   150    % Neutrophils      %   76    % Lymphocytes      %   12    % Monocytes      %   7    % Eosinophils      %   4    % Basophils      %   0    % Immature Granulocytes      %   1    NRBC/W      <1 /100   0    Absolute Neutrophil      1.6 - 8.3 10e3/uL   6.9    Absolute Lymphocytes      0.8 - 5.3 10e3/uL   1.1    Absolute Monocytes      0.0 - 1.3 10e3/uL   0.7    Absolute Eosinophils      0.0 - 0.7 10e3/uL   0.4    Absolute Basophils      0.0 - 0.2 10e3/uL   0.0    Absolute Immature Granulocytes      <=0.4 10e3/uL   0.1    Absolute NRBCs      10e3/uL   0.0    Sodium      135 - 145 mmol/L   140    Potassium      3.4 - 5.3 mmol/L   4.4    Carbon Dioxide (CO2)      22 - 29 mmol/L   26    Anion Gap      7 - 15 mmol/L   11    Urea Nitrogen      8.0 - 23.0 mg/dL   24.9 (H)    Creatinine      0.67 - 1.17 mg/dL   1.28 (H)    GFR Estimate      >60 mL/min/1.73m2   55 (L)    Calcium      8.8 - 10.4 mg/dL   9.5    Chloride      98 - 107 mmol/L   103    Glucose      70 - 99 mg/dL   101 (H)    Alkaline Phosphatase      40 - 150 U/L   106    AST      0 - 45 U/L   23    ALT      0 - 70 U/L   19    Protein Total      6.4 - 8.3 g/dL   6.6    Albumin      3.5 - 5.2 g/dL   4.2    Bilirubin Total      <=1.2 mg/dL   0.6    PSA Tumor Marker      ng/mL 0.58  0.03  0.02    Testosterone Total      240 - 950 ng/dL 246  <2 (L)         IMPRESSION/PLAN: Stage FRANKIE adenocarcinoma of the prostate.  Prostate cancer is a Wiliam 4+5=9 acinar adenocarcinoma with intraductal carcinoma involving 30% of the prostate, lymphovascular invasion, extraprostatic  extension, invasion of the left seminal vesicle, positive surgical margin, but without lymph node metastases.  Patient underwent definitive surgery (11/14/2024).  There is PSA persistence with postoperative PSA of 0 173 ng/mL (03/24/2025).  There are suspicious PSMA-avid subcentimeter pelvic lymph nodes in the presacral and right pelvic sidewall area detected on restaging PSMA PET/CT scan (04/04/2025). Patient has stage IV prostate cancer and germline cancer gene mutation panel ([employer]), and somatic next generation sequencing ([facility]) on the prostatectomy sample (11/14/2024) will be performed to evaluate for mutations and biomarkers that can direct effective therapies.   Radiation oncology consultation (04/18/2025) felt that the suspicious pelvic lymphadenopathy was in close proximity to small bowel precluding definitive radiation therapy.  Patient opted against radiation oncology consultation in [place].  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  Enzalutamide, apalutamide, and darolutamide are associated with multiple drug interactions, in particular with dronedarone and apixaban, which makes monitoring of these medications and their therapeutic effects exquisitely difficult, and abiraterone preferred.  There are no adverse effects.  Abiraterone/leuprolide will be continued without modification.  I reviewed the risk and side effects of abiraterone with the patient, which is associated with fatigue, hot flashes, fever, cognitive difficulty, depression, rash, abdominal pain, nausea, vomiting, diarrhea, constipation, myalgias, arthralgias, edema, falls, fractures, adrenal insufficiency, hypertension, arrhythmia, myocardial infarction,  heart failure, hepatotoxicity, hyperlipidemia, anemia.  Leuprolide is associated with fatigue, hot flashes, weakness, loss of muscle mass, weight gain, forgetfulness, depression, irritability, breast enlargement, loss of libido, impotence, joint stiffness and pain, coronary artery disease, osteoporosis, and bone fracture.  Patient understood the indication and risks and agreed to continue therapy.  Patient will return to clinic in 6 weeks with CBC, metabolic panel, PSA, and for the next leuprolide injection sometime. The current and past history, clinical evaluation, reviewing diagnostic tests and imaging with the patient, assessment, complex management of abiraterone/leuprolide toxicities, and planning occurred over 30 minutes.       Polo Webster MD    cc: MD Sandy Echevarria MD Clayton Chen, MD    Again, thank you for allowing me to participate in the care of your patient.        Sincerely,        Polo Webster MD    Electronically signed

## 2025-07-23 LAB — TESTOST SERPL-MCNC: <2 NG/DL (ref 240–950)

## 2025-07-24 ENCOUNTER — TELEPHONE (OUTPATIENT)
Dept: ONCOLOGY | Facility: CLINIC | Age: 85
End: 2025-07-24
Payer: MEDICARE

## 2025-07-24 NOTE — TELEPHONE ENCOUNTER
Oral Chemotherapy Monitoring Program    Subjective/Objective:  Mushtaq Olsen is a 85 year old male contacted by phone for a follow-up visit for oral chemotherapy.  Mushtaq confirms that he is adherent to his abiraterone (Zytiga) medication and is taking it as 4 tablets (1000 mg) by mouth daily on an empty stomach. He has not missed any doses in the past 2 weeks. He has been doing well with his Zytiga and has not had any side effects from the medication. He denies hot flashes, fatigue, hypertension, edema, and muscle/joint pain.        4/30/2025    12:00 PM 5/1/2025     3:00 PM 6/2/2025     9:00 AM 6/16/2025     1:00 PM 6/19/2025     6:00 PM 6/23/2025     1:00 PM 7/24/2025     2:00 PM   ORAL CHEMOTHERAPY   Assessment Type Discontinuation Initial Work up New Teach;Refill Initial Follow up Refill Lab Monitoring Monthly Follow up   Stop Date --         Diagnosis Code Prostate Cancer Prostate Cancer Prostate Cancer Prostate Cancer Prostate Cancer Prostate Cancer Prostate Cancer   Providers Dr. Darin Webster   [facility]   Is this patient followed by the [facility] OC team?   No       Drug Name Xtandi (enzalutamide)  Zytiga (abiraterone)  Zytiga (abiraterone)  Zytiga (abiraterone)  Zytiga (abiraterone)  Zytiga (abiraterone)  Zytiga (abiraterone)   Dose  1,000 mg  1,000 mg  1,000 mg  1,000 mg 1,000 mg   Current Schedule  Daily  Daily  Daily  Daily Daily   Cycle Details  Continuous Continuous Continuous  Continuous Continuous   Start Date of Last Cycle    6/9/2025 6/9/2025    Planned next cycle start date   6/7/2025 7/9/2025 7/9/2025 7/9/2025    Doses missed in last 2 weeks    0   0   Adherence Assessment    Adherent   Adherent   Adverse Effects    No AE identified during assessment  No AE identified during assessment No AE identified during assessment   Home BPs   any BPs> 160/100 not done      Any  "new drug interactions? Yes Yes No No      Pharmacist Intervention? Yes Yes        Intervention(s) -- --        Is the dose as ordered appropriate for the patient?   Yes       Was a medication prescribed as part of a CPA?   No           Data saved with a previous flowsheet row definition       Last PHQ-2 Score on record:       4/18/2025     9:38 AM 11/27/2024     4:15 PM   PHQ-2 ( 1999 [employer])   Q1: Little interest or pleasure in doing things 0 0   Q2: Feeling down, depressed or hopeless 0 0   PHQ-2 Score 0 0       Vitals:  BP:   BP Readings from Last 1 Encounters:   07/21/25 (!) 176/92     Wt Readings from Last 1 Encounters:   07/21/25 98.2 kg (216 lb 8 oz)     Estimated body surface area is 2.17 meters squared as calculated from the following:    Height as of 7/21/25: 1.727 m (5' 7.99\").    Weight as of 7/21/25: 98.2 kg (216 lb 8 oz).    Labs:  _  Result Component Current Result Ref Range   Sodium 140 (7/21/2025) 135 - 145 mmol/L     _  Result Component Current Result Ref Range   Potassium 4.4 (7/21/2025) 3.4 - 5.3 mmol/L     _  Result Component Current Result Ref Range   Calcium 9.5 (7/21/2025) 8.8 - 10.4 mg/dL     No results found for Mag within last 30 days.     No results found for Phos within last 30 days.     _  Result Component Current Result Ref Range   Albumin 4.2 (7/21/2025) 3.5 - 5.2 g/dL     _  Result Component Current Result Ref Range   Urea Nitrogen 24.9 (H) (7/21/2025) 8.0 - 23.0 mg/dL     _  Result Component Current Result Ref Range   Creatinine 1.28 (H) (7/21/2025) 0.67 - 1.17 mg/dL     _  Result Component Current Result Ref Range   AST 23 (7/21/2025) 0 - 45 U/L     _  Result Component Current Result Ref Range   ALT 19 (7/21/2025) 0 - 70 U/L     _  Result Component Current Result Ref Range   Bilirubin Total 0.6 (7/21/2025) <=1.2 mg/dL     _  Result Component Current Result Ref Range   WBC Count 9.1 (7/21/2025) 4.0 - 11.0 10e3/uL     _  Result Component Current Result Ref Range   Hemoglobin 13.8 " (7/21/2025) 13.3 - 17.7 g/dL     _  Result Component Current Result Ref Range   Platelet Count 150 (7/21/2025) 150 - 450 10e3/uL     _  Result Component Current Result Ref Range   Absolute Neutrophils 6.9 (7/21/2025) 1.6 - 8.3 10e3/uL     No results found for ANC within last 30 days.          Assessment/Plan:  Overall Mushtaq is doing well with few side effects.     He was told to continue to take his abiraterone (Zytiga) as prescribed.     Follow-Up:  Next labs: 8/20  Next oncology appointment: 8/20 with Dr. Webster    Refill Due:  8/8    Delroy Shen  Pharmacy Intern  Oral Chemotherapy Monitoring Program  [facility]  [phone]

## 2025-07-25 ENCOUNTER — TRANSFERRED RECORDS (OUTPATIENT)
Dept: HEALTH INFORMATION MANAGEMENT | Facility: CLINIC | Age: 85
End: 2025-07-25
Payer: MEDICARE

## 2025-07-30 ENCOUNTER — TELEPHONE (OUTPATIENT)
Dept: OPHTHALMOLOGY | Facility: CLINIC | Age: 85
End: 2025-07-30
Payer: MEDICARE

## 2025-07-30 NOTE — TELEPHONE ENCOUNTER
Called and spoke to Mushtaq Landin we need any notes from previous eye provider or Retina faxed to our clinic- It has been five yrs since Mushtaq was seen by Dr. Sly Valladares to schedule in a new Retina with Dr. Atwood if Mushtaq would like to be sooner than Nyasia Dale Communication Facilitator on 7/30/2025 at 10:44 AM

## 2025-07-30 NOTE — TELEPHONE ENCOUNTER
Hello,     Call center sent me a private message about this pt .    It's been five years since this pt has seen Dr. Coppola - appt was made by the call center for Sept, but wife is requesting Mushtaq to be seen sooner than next available.     Are you able to accommodate a sooner appt ?    Thank you,     Carolina Dale Communication Facilitator on 7/30/2025 at 10:23 AM

## 2025-07-31 NOTE — TELEPHONE ENCOUNTER
[facility] Call Center    Phone Message    May a detailed message be left on voicemail: yes     Reason for Call: Other: Patient had images done a week ago at [facility] and they faxed obver the images to Dr Vasquez yesterday. Spouse Lauren want to know if received it and had a chance to look at it yet. She said patient need to be seen urgently and t please get back to her soon.    Please call spouse back at [phone]. Thank you.    Action Taken: Message routed to:  [facility]    Travel Screening: Not Applicable

## 2025-08-20 ENCOUNTER — ONCOLOGY VISIT (OUTPATIENT)
Dept: ONCOLOGY | Facility: CLINIC | Age: 85
End: 2025-08-20
Attending: INTERNAL MEDICINE
Payer: MEDICARE

## 2025-08-20 ENCOUNTER — LAB (OUTPATIENT)
Dept: LAB | Facility: CLINIC | Age: 85
End: 2025-08-20
Payer: MEDICARE

## 2025-08-20 VITALS
BODY MASS INDEX: 32.37 KG/M2 | SYSTOLIC BLOOD PRESSURE: 162 MMHG | OXYGEN SATURATION: 97 % | RESPIRATION RATE: 16 BRPM | TEMPERATURE: 98.2 F | DIASTOLIC BLOOD PRESSURE: 89 MMHG | HEIGHT: 68 IN | HEART RATE: 61 BPM | WEIGHT: 213.6 LBS

## 2025-08-20 DIAGNOSIS — C61 PROSTATE CANCER (H): Primary | ICD-10-CM

## 2025-08-20 DIAGNOSIS — R21 RASH: ICD-10-CM

## 2025-08-20 DIAGNOSIS — H35.341 MACULAR HOLE OF RIGHT EYE: Primary | ICD-10-CM

## 2025-08-20 DIAGNOSIS — C61 PROSTATE CANCER (H): ICD-10-CM

## 2025-08-20 LAB
ALBUMIN SERPL BCG-MCNC: 4.4 G/DL (ref 3.5–5.2)
ALP SERPL-CCNC: 96 U/L (ref 40–150)
ALT SERPL W P-5'-P-CCNC: 19 U/L (ref 0–70)
ANION GAP SERPL CALCULATED.3IONS-SCNC: 13 MMOL/L (ref 7–15)
AST SERPL W P-5'-P-CCNC: 25 U/L (ref 0–45)
BASOPHILS # BLD AUTO: 0.03 10E3/UL (ref 0–0.2)
BASOPHILS NFR BLD AUTO: 0.4 %
BILIRUB SERPL-MCNC: 0.5 MG/DL
BUN SERPL-MCNC: 27.8 MG/DL (ref 8–23)
CALCIUM SERPL-MCNC: 9.4 MG/DL (ref 8.8–10.4)
CHLORIDE SERPL-SCNC: 103 MMOL/L (ref 98–107)
CREAT SERPL-MCNC: 1.21 MG/DL (ref 0.67–1.17)
EGFRCR SERPLBLD CKD-EPI 2021: 59 ML/MIN/1.73M2
EOSINOPHIL # BLD AUTO: 0.13 10E3/UL (ref 0–0.7)
EOSINOPHIL NFR BLD AUTO: 1.7 %
ERYTHROCYTE [DISTWIDTH] IN BLOOD BY AUTOMATED COUNT: 13.6 % (ref 10–15)
GLUCOSE SERPL-MCNC: 106 MG/DL (ref 70–99)
HCO3 SERPL-SCNC: 24 MMOL/L (ref 22–29)
HCT VFR BLD AUTO: 41.1 % (ref 40–53)
HGB BLD-MCNC: 13.9 G/DL (ref 13.3–17.7)
IMM GRANULOCYTES # BLD: 0.03 10E3/UL
IMM GRANULOCYTES NFR BLD: 0.4 %
LYMPHOCYTES # BLD AUTO: 0.9 10E3/UL (ref 0.8–5.3)
LYMPHOCYTES NFR BLD AUTO: 11.7 %
MCH RBC QN AUTO: 31.7 PG (ref 26.5–33)
MCHC RBC AUTO-ENTMCNC: 33.8 G/DL (ref 31.5–36.5)
MCV RBC AUTO: 93.8 FL (ref 78–100)
MONOCYTES # BLD AUTO: 0.49 10E3/UL (ref 0–1.3)
MONOCYTES NFR BLD AUTO: 6.4 %
NEUTROPHILS # BLD AUTO: 6.12 10E3/UL (ref 1.6–8.3)
NEUTROPHILS NFR BLD AUTO: 79.4 %
NRBC # BLD AUTO: <0.03 10E3/UL
NRBC BLD AUTO-RTO: 0 /100
PLATELET # BLD AUTO: 141 10E3/UL (ref 150–450)
POTASSIUM SERPL-SCNC: 5.2 MMOL/L (ref 3.4–5.3)
PROT SERPL-MCNC: 6.7 G/DL (ref 6.4–8.3)
PSA SERPL DL<=0.01 NG/ML-MCNC: <0.01 NG/ML
RBC # BLD AUTO: 4.38 10E6/UL (ref 4.4–5.9)
SODIUM SERPL-SCNC: 140 MMOL/L (ref 135–145)
WBC # BLD AUTO: 7.7 10E3/UL (ref 4–11)

## 2025-08-20 PROCEDURE — 84153 ASSAY OF PSA TOTAL: CPT | Performed by: PATHOLOGY

## 2025-08-20 PROCEDURE — 36415 COLL VENOUS BLD VENIPUNCTURE: CPT | Performed by: PATHOLOGY

## 2025-08-20 PROCEDURE — G0463 HOSPITAL OUTPT CLINIC VISIT: HCPCS | Performed by: INTERNAL MEDICINE

## 2025-08-20 PROCEDURE — 85025 COMPLETE CBC W/AUTO DIFF WBC: CPT | Performed by: PATHOLOGY

## 2025-08-20 PROCEDURE — 250N000011 HC RX IP 250 OP 636: Mod: JZ | Performed by: INTERNAL MEDICINE

## 2025-08-20 PROCEDURE — 80053 COMPREHEN METABOLIC PANEL: CPT | Performed by: PATHOLOGY

## 2025-08-20 RX ORDER — TRIAMCINOLONE ACETONIDE 1 MG/G
CREAM TOPICAL 2 TIMES DAILY PRN
Qty: 80 G | Refills: 1 | Status: SHIPPED | OUTPATIENT
Start: 2025-08-20

## 2025-08-20 RX ORDER — BICALUTAMIDE 50 MG/1
50 TABLET, FILM COATED ORAL DAILY
COMMUNITY
Start: 2025-04-28

## 2025-08-20 RX ADMIN — LEUPROLIDE ACETATE 22.5 MG: 22.5 INJECTION, SUSPENSION, EXTENDED RELEASE SUBCUTANEOUS at 15:24

## 2025-08-20 ASSESSMENT — PAIN SCALES - GENERAL: PAINLEVEL_OUTOF10: NO PAIN (0)

## 2025-08-28 ENCOUNTER — OFFICE VISIT (OUTPATIENT)
Dept: OPHTHALMOLOGY | Facility: CLINIC | Age: 85
End: 2025-08-28
Attending: OPHTHALMOLOGY
Payer: MEDICARE

## 2025-08-28 DIAGNOSIS — H35.341 MACULAR HOLE OF RIGHT EYE: ICD-10-CM

## 2025-08-28 PROCEDURE — 92134 CPTRZ OPH DX IMG PST SGM RTA: CPT | Performed by: OPHTHALMOLOGY

## 2025-08-28 PROCEDURE — 92250 FUNDUS PHOTOGRAPHY W/I&R: CPT | Performed by: OPHTHALMOLOGY

## 2025-08-28 ASSESSMENT — CONF VISUAL FIELD
OS_INFERIOR_TEMPORAL_RESTRICTION: 0
OS_NORMAL: 1
OD_INFERIOR_NASAL_RESTRICTION: 2
OS_SUPERIOR_NASAL_RESTRICTION: 0
OS_INFERIOR_NASAL_RESTRICTION: 0
OS_SUPERIOR_TEMPORAL_RESTRICTION: 0
OD_SUPERIOR_NASAL_RESTRICTION: 2
OD_INFERIOR_TEMPORAL_RESTRICTION: 2
METHOD: COUNTING FINGERS
OD_SUPERIOR_TEMPORAL_RESTRICTION: 2

## 2025-08-28 ASSESSMENT — SLIT LAMP EXAM - LIDS
COMMENTS: UL DERMATOCHALASIS
COMMENTS: UL DERMATOCHALASIS

## 2025-08-28 ASSESSMENT — TONOMETRY
OS_IOP_MMHG: 13
OD_IOP_MMHG: 19
IOP_METHOD: ICARE

## 2025-08-28 ASSESSMENT — VISUAL ACUITY
METHOD: SNELLEN - LINEAR
OD_SC: CF @ FACE
OS_SC: 20/60

## 2025-08-28 ASSESSMENT — EXTERNAL EXAM - LEFT EYE: OS_EXAM: NORMAL

## 2025-08-28 ASSESSMENT — CUP TO DISC RATIO
OD_RATIO: 0.5
OS_RATIO: 0.4

## 2025-08-28 ASSESSMENT — EXTERNAL EXAM - RIGHT EYE: OD_EXAM: NORMAL

## (undated) DEVICE — PAD CHUX UNDERPAD 30X36" P3036C

## (undated) DEVICE — GLOVE PROTEXIS MICRO 6.0  2D73PM60

## (undated) DEVICE — EYE GAS C3F8

## (undated) DEVICE — SYR 50ML LL W/O NDL 309653

## (undated) DEVICE — EYE CANN SOFT TIP 25GA FOR VALVED SET 8065149530

## (undated) DEVICE — FILTER PIRANHA DISP 2228.901

## (undated) DEVICE — LINEN TOWEL PACK X5 5464

## (undated) DEVICE — PROTECTOR ARM ONE-STEP TRENDELENBURG 40418

## (undated) DEVICE — TUBING SET PIRANHA 41702208

## (undated) DEVICE — DAVINCI XI DRAPE COLUMN 470341

## (undated) DEVICE — TAPE MICROPORE 2"X1.5YD 1530S-2

## (undated) DEVICE — DRAPE MICRO 41X81"

## (undated) DEVICE — ANTIFOG SOLUTION SEE SHARP 150M TROCAR SWABS 30978 (COI)

## (undated) DEVICE — EYE NDL RETROBULBAR ATKINSON 25GA 1.5" 581637

## (undated) DEVICE — BAG URINARY DRAIN 4000ML LF 153509

## (undated) DEVICE — PACK DAVINCI UROLOGY SBA15UDFSG

## (undated) DEVICE — SU ETHILON 2-0 FS 18" 664G

## (undated) DEVICE — CATH FOLEY 3WAY 22FR 30ML LATEX 0167SI22

## (undated) DEVICE — GLOVE BIOGEL PI MICRO SZ 7.5 48575

## (undated) DEVICE — TAPE DURAPORE 3" SILK 1538-3

## (undated) DEVICE — SOL NACL 0.9% IRRIG 3000ML BAG 2B7477

## (undated) DEVICE — STRAP KNEE/BODY 31143004

## (undated) DEVICE — SOL WATER IRRIG 1000ML BOTTLE 2F7114

## (undated) DEVICE — DRSG ABDOMINAL 07 1/2X8" 7197D

## (undated) DEVICE — BLADE MORCELLATOR WOLF PIRANHA 4.75X385MM 49700103

## (undated) DEVICE — EYE PROBE LASER 25GA FLEX RFID ILLUMINATED 8065751593

## (undated) DEVICE — SPONGE RAY-TEC 4X8" 7318

## (undated) DEVICE — SUCTION WATERBUG 12FT FLR FLEX TUBE NS LF DISP A90030

## (undated) DEVICE — GLOVE BIOGEL PI MICRO SZ 6.5 48565

## (undated) DEVICE — SPECIMEN TRAP TISSUE CONTAINER PIRANHA 2208120

## (undated) DEVICE — EYE FORCEPS TIP ADV ILM DISP 25+ 725.44P

## (undated) DEVICE — GOWN XXLG REINFORCED 9071EL

## (undated) DEVICE — RX SURGIFLO HEMOSTATIC MATRIX W/THROMBIN 8ML 2994

## (undated) DEVICE — RULER SURGICAL PLASTIC STRL LF CS628

## (undated) DEVICE — DRAIN JACKSON PRATT RESERVOIR 100ML SU130-1305

## (undated) DEVICE — Device

## (undated) DEVICE — EYE PACK 25GA CONSTELLATION 10,000 CPM PPK9380-02

## (undated) DEVICE — SOL NACL 0.9% INJ 1000ML BAG 2B1324X

## (undated) DEVICE — GLOVE BIOGEL PI MICRO SZ 7.0 48570

## (undated) DEVICE — CLIP ENDO HEMO-LOC PURPLE LG 544240

## (undated) DEVICE — MANIFOLD NEPTUNE 4 PORT 700-20

## (undated) DEVICE — DRAPE MAYO STAND 23X54 8337

## (undated) DEVICE — DEVICE CATH STABILIZATION STATLOCK FOLEY 3-WAY FOL0105

## (undated) DEVICE — CATH TRAY FOLEY SURESTEP 16FR WDRAIN BAG STLK LATEX A300316A

## (undated) DEVICE — DAVINCI XI SEAL UNIVERSAL 5-12MM 470500

## (undated) DEVICE — SOL NACL 0.9% IRRIG 1000ML BOTTLE 2F7124

## (undated) DEVICE — CATH LASER URETERAL 7.1FRX40CM G17797  022403-7.1-40

## (undated) DEVICE — TUBING SET THERMEDX UROLOGY SGL USE LL0006

## (undated) DEVICE — GLOVE BIOGEL PI MICRO INDICATOR UNDERGLOVE SZ 6.5 48965

## (undated) DEVICE — DRSG GAUZE 4X8" NON21842

## (undated) DEVICE — SYR 01ML 27GA 0.5" ECLIPSE 305789

## (undated) DEVICE — TUBING CONMED AIRSEAL SMOKE EVAC INSUFFLATION ASM-EVAC

## (undated) DEVICE — ENDO POUCH UNIV RETRIEVAL SYSTEM INZII 10MM CD001

## (undated) DEVICE — DAVINCI HOT SHEARS TIP COVER  400180

## (undated) DEVICE — CLEANER INST PRE-KLENZ SOAK SHIELD TUBE 6 ML MEDIUM 2D66J4

## (undated) DEVICE — SU PLAIN 6-0 TG140-8 18" 1735G

## (undated) DEVICE — ENDO TROCAR CONMED AIRSEAL BLADELESS 12X120MM IAS12-120LP

## (undated) DEVICE — SYR PISTON IRRIGATION 60 ML DYND20325

## (undated) DEVICE — DAVINCI XI DRAPE ARM 470015

## (undated) DEVICE — NDL INSUFFLATION 13GA 120MM C2201

## (undated) DEVICE — TAPE MICROPORE 1"X1.5YD 1530S-1

## (undated) DEVICE — GLOVE BIOGEL PI MICRO INDICATOR UNDERGLOVE SZ 7.5 48975

## (undated) DEVICE — LASER FIBER HOLMIUM MOSES 550 D/F/L AC-10030120

## (undated) DEVICE — SU WND CLOSURE V-LOC 3-0 CV-23 6" VLOCM1904

## (undated) DEVICE — EYE SHIELD PLASTIC

## (undated) DEVICE — ESU GROUND PAD UNIVERSAL W/O CORD

## (undated) DEVICE — SU WND CLOSURE VLOC 90 ABS 3-0 VIOLET 6" CV-23 VLOCM0804

## (undated) DEVICE — PACK VITRECTOMY PQ15VI57E

## (undated) DEVICE — DRAIN JACKSON PRATT CHANNEL 19FR ROUND HUBLESS SIL JP-2230

## (undated) DEVICE — SU MONOCRYL 4-0 PS-2 18" UND Y496G

## (undated) DEVICE — SUCTION IRR STRYKERFLOW II W/TIP 250-070-520

## (undated) DEVICE — LINEN GOWN X4 5410

## (undated) DEVICE — LIGHT HANDLE X2

## (undated) DEVICE — SUCTION MANIFOLD NEPTUNE 2 SYS 4 PORT 0702-020-000

## (undated) DEVICE — EYE LENS VITRECTOMY MAGNIFYING ODVM

## (undated) DEVICE — ESU CORD BIPOLAR GREEN 10-4000

## (undated) DEVICE — KIT PATIENT POSITIONING PIGAZZI LATEX FREE 40580

## (undated) DEVICE — SU VICRYL 0 UR-6 27" J603H

## (undated) DEVICE — TUBING SUCTION 12"X1/4" N612

## (undated) RX ORDER — ACETAMINOPHEN 325 MG/1
TABLET ORAL
Status: DISPENSED
Start: 2024-11-14

## (undated) RX ORDER — FENTANYL CITRATE 50 UG/ML
INJECTION, SOLUTION INTRAMUSCULAR; INTRAVENOUS
Status: DISPENSED
Start: 2024-11-14

## (undated) RX ORDER — SULFAMETHOXAZOLE/TRIMETHOPRIM 800-160 MG
TABLET ORAL
Status: DISPENSED
Start: 2024-09-30

## (undated) RX ORDER — PROPOFOL 10 MG/ML
INJECTION, EMULSION INTRAVENOUS
Status: DISPENSED
Start: 2023-05-18

## (undated) RX ORDER — PROPOFOL 10 MG/ML
INJECTION, EMULSION INTRAVENOUS
Status: DISPENSED
Start: 2020-07-21

## (undated) RX ORDER — BUPIVACAINE HYDROCHLORIDE 5 MG/ML
INJECTION, SOLUTION EPIDURAL; INTRACAUDAL
Status: DISPENSED
Start: 2024-11-27

## (undated) RX ORDER — ONDANSETRON 2 MG/ML
INJECTION INTRAMUSCULAR; INTRAVENOUS
Status: DISPENSED
Start: 2023-05-18

## (undated) RX ORDER — DEXAMETHASONE SODIUM PHOSPHATE 4 MG/ML
INJECTION, SOLUTION INTRA-ARTICULAR; INTRALESIONAL; INTRAMUSCULAR; INTRAVENOUS; SOFT TISSUE
Status: DISPENSED
Start: 2023-05-18

## (undated) RX ORDER — PHENYLEPHRINE HYDROCHLORIDE 100 MG/ML
SOLUTION/ DROPS OPHTHALMIC
Status: DISPENSED
Start: 2020-07-21

## (undated) RX ORDER — HYDROMORPHONE HYDROCHLORIDE 1 MG/ML
INJECTION, SOLUTION INTRAMUSCULAR; INTRAVENOUS; SUBCUTANEOUS
Status: DISPENSED
Start: 2024-11-14

## (undated) RX ORDER — LIDOCAINE HYDROCHLORIDE 20 MG/ML
INJECTION, SOLUTION EPIDURAL; INFILTRATION; INTRACAUDAL; PERINEURAL
Status: DISPENSED
Start: 2020-07-21

## (undated) RX ORDER — FENTANYL CITRATE-0.9 % NACL/PF 10 MCG/ML
PLASTIC BAG, INJECTION (ML) INTRAVENOUS
Status: DISPENSED
Start: 2023-05-18

## (undated) RX ORDER — FENTANYL CITRATE 50 UG/ML
INJECTION, SOLUTION INTRAMUSCULAR; INTRAVENOUS
Status: DISPENSED
Start: 2023-05-18

## (undated) RX ORDER — ONDANSETRON 2 MG/ML
INJECTION INTRAMUSCULAR; INTRAVENOUS
Status: DISPENSED
Start: 2020-07-21

## (undated) RX ORDER — DEXAMETHASONE SODIUM PHOSPHATE 4 MG/ML
INJECTION, SOLUTION INTRA-ARTICULAR; INTRALESIONAL; INTRAMUSCULAR; INTRAVENOUS; SOFT TISSUE
Status: DISPENSED
Start: 2024-11-14

## (undated) RX ORDER — HEPARIN SODIUM 5000 [USP'U]/.5ML
INJECTION, SOLUTION INTRAVENOUS; SUBCUTANEOUS
Status: DISPENSED
Start: 2024-11-14

## (undated) RX ORDER — FENTANYL CITRATE 50 UG/ML
INJECTION, SOLUTION INTRAMUSCULAR; INTRAVENOUS
Status: DISPENSED
Start: 2020-07-21

## (undated) RX ORDER — CEFAZOLIN SODIUM/WATER 2 G/20 ML
SYRINGE (ML) INTRAVENOUS
Status: DISPENSED
Start: 2024-11-14

## (undated) RX ORDER — LIDOCAINE HYDROCHLORIDE 20 MG/ML
JELLY TOPICAL
Status: DISPENSED
Start: 2024-08-27